# Patient Record
Sex: MALE | Race: WHITE | NOT HISPANIC OR LATINO | Employment: OTHER | URBAN - METROPOLITAN AREA
[De-identification: names, ages, dates, MRNs, and addresses within clinical notes are randomized per-mention and may not be internally consistent; named-entity substitution may affect disease eponyms.]

---

## 2018-01-19 ENCOUNTER — OFFICE VISIT CONVERTED (OUTPATIENT)
Dept: SURGERY | Facility: CLINIC | Age: 78
End: 2018-01-19
Attending: PHYSICIAN ASSISTANT

## 2018-04-05 ENCOUNTER — OFFICE VISIT CONVERTED (OUTPATIENT)
Dept: SURGERY | Facility: CLINIC | Age: 78
End: 2018-04-05
Attending: PHYSICIAN ASSISTANT

## 2018-04-13 ENCOUNTER — OFFICE VISIT CONVERTED (OUTPATIENT)
Dept: UROLOGY | Facility: CLINIC | Age: 78
End: 2018-04-13
Attending: UROLOGY

## 2018-05-02 ENCOUNTER — CONVERSION ENCOUNTER (OUTPATIENT)
Dept: SURGERY | Facility: CLINIC | Age: 78
End: 2018-05-02

## 2018-05-02 ENCOUNTER — OFFICE VISIT CONVERTED (OUTPATIENT)
Dept: UROLOGY | Facility: CLINIC | Age: 78
End: 2018-05-02
Attending: UROLOGY

## 2018-05-25 ENCOUNTER — CONVERSION ENCOUNTER (OUTPATIENT)
Dept: SURGERY | Facility: CLINIC | Age: 78
End: 2018-05-25

## 2018-05-25 ENCOUNTER — OFFICE VISIT CONVERTED (OUTPATIENT)
Dept: UROLOGY | Facility: CLINIC | Age: 78
End: 2018-05-25
Attending: UROLOGY

## 2018-07-11 ENCOUNTER — OFFICE VISIT CONVERTED (OUTPATIENT)
Dept: UROLOGY | Facility: CLINIC | Age: 78
End: 2018-07-11
Attending: UROLOGY

## 2018-10-12 ENCOUNTER — PROCEDURE VISIT CONVERTED (OUTPATIENT)
Dept: UROLOGY | Facility: CLINIC | Age: 78
End: 2018-10-12
Attending: UROLOGY

## 2019-01-16 ENCOUNTER — PROCEDURE VISIT CONVERTED (OUTPATIENT)
Dept: UROLOGY | Facility: CLINIC | Age: 79
End: 2019-01-16
Attending: UROLOGY

## 2019-01-17 ENCOUNTER — HOSPITAL ENCOUNTER (OUTPATIENT)
Dept: SURGERY | Facility: CLINIC | Age: 79
Discharge: HOME | End: 2019-01-17
Attending: UROLOGY

## 2019-04-15 ENCOUNTER — OFFICE VISIT CONVERTED (OUTPATIENT)
Dept: CARDIOLOGY | Facility: CLINIC | Age: 79
End: 2019-04-15
Attending: INTERNAL MEDICINE

## 2019-04-17 ENCOUNTER — HOSPITAL ENCOUNTER (OUTPATIENT)
Dept: SURGERY | Facility: CLINIC | Age: 79
Discharge: HOME | End: 2019-04-17
Attending: UROLOGY

## 2019-04-17 ENCOUNTER — PROCEDURE VISIT CONVERTED (OUTPATIENT)
Dept: UROLOGY | Facility: CLINIC | Age: 79
End: 2019-04-17
Attending: UROLOGY

## 2019-04-29 ENCOUNTER — CONVERSION ENCOUNTER (OUTPATIENT)
Dept: CARDIOLOGY | Facility: CLINIC | Age: 79
End: 2019-04-29
Attending: INTERNAL MEDICINE

## 2019-10-16 ENCOUNTER — PROCEDURE VISIT CONVERTED (OUTPATIENT)
Dept: UROLOGY | Facility: CLINIC | Age: 79
End: 2019-10-16
Attending: UROLOGY

## 2019-10-16 ENCOUNTER — HOSPITAL ENCOUNTER (OUTPATIENT)
Dept: SURGERY | Facility: CLINIC | Age: 79
Discharge: HOME | End: 2019-10-16
Attending: UROLOGY

## 2020-04-17 ENCOUNTER — TELEPHONE CONVERTED (OUTPATIENT)
Dept: UROLOGY | Facility: CLINIC | Age: 80
End: 2020-04-17
Attending: UROLOGY

## 2020-10-06 ENCOUNTER — PROCEDURE VISIT CONVERTED (OUTPATIENT)
Dept: UROLOGY | Facility: CLINIC | Age: 80
End: 2020-10-06
Attending: UROLOGY

## 2020-10-06 ENCOUNTER — HOSPITAL ENCOUNTER (OUTPATIENT)
Dept: OTHER | Facility: HOSPITAL | Age: 80
Discharge: HOME | End: 2020-10-06
Attending: UROLOGY

## 2020-12-28 ENCOUNTER — OFFICE VISIT CONVERTED (OUTPATIENT)
Dept: CARDIOLOGY | Facility: CLINIC | Age: 80
End: 2020-12-28
Attending: INTERNAL MEDICINE

## 2020-12-31 ENCOUNTER — HOSPITAL ENCOUNTER (OUTPATIENT)
Dept: NUCLEAR MEDICINE | Facility: HOSPITAL | Age: 80
Discharge: HOME | End: 2020-12-31
Attending: INTERNAL MEDICINE

## 2021-04-26 ENCOUNTER — CONVERSION ENCOUNTER (OUTPATIENT)
Dept: SURGERY | Facility: CLINIC | Age: 81
End: 2021-04-26

## 2021-04-26 ENCOUNTER — PROCEDURE VISIT CONVERTED (OUTPATIENT)
Dept: UROLOGY | Facility: CLINIC | Age: 81
End: 2021-04-26
Attending: UROLOGY

## 2021-04-26 ENCOUNTER — HOSPITAL ENCOUNTER (OUTPATIENT)
Dept: SURGERY | Facility: CLINIC | Age: 81
Discharge: HOME | End: 2021-04-26
Attending: UROLOGY

## 2021-05-10 NOTE — PROCEDURES
Procedure Note      Patient Name: Wood Desai   Patient ID: [id number]   Sex: Male   YOB: 1940    Primary Care Provider: Lea Fallon MD   Referring Provider: Lea Fallon MD    Visit Date: October 6, 2020    Provider: Arvind Umana MD   Location: [facility]   Location Address: [address] [place] [address]  [id number]   Location Phone: [phone]          Cystoscopy Procedure:  The patients urine was viewe d under a microscope during his clinical visit: no RBC present, no WBC present, no Bacteria present.          PROCEDURE: Flexible cystoscope was passed per urethra into the bladder without difficulty after proper consent.    TUR defect.   Still some bilateral prostatic tissue, lateral lobes    Bladder was large with some moderate trabeculations throughout.  No abnormalities seen     Of note, there was no increased vascularity as well. Both ureteral orifices were identified and were normal in appearance. The flexible cystoscope was removed. The patient tolerated the procedure well.    No changes from his last scope    Urothelial CIS      1/19 cystoscopynegative  10/18 cystoscopynegative  6/18 cystoscopy with bladder biopsyseveral erythematous areas on the posterior wall  Pathologyurothelial atypia  4/18 TURP and bladder biopsyseveral small erythematous area on the posterior bladder wall  Pathologyprostate Normal.  Bladder biopsy CIS of the bladder.           Assessment  · CIS (carcinoma in situ of bladder)     233.7/D09.0      Plan  · Orders  o Cystoscopy (61201) - 233.7/D09.0 - 10/06/2020  o Urine cytology (09259) - 233.7/D09.0 - 10/06/2020  · Medications  o Medications have been Reconciled  o Transition of Care or Provider Policy  · Instructions  o Electronically Identified Patient Education Materials Provided Electronically       per NCCN guidelines we will have him follow-up in 6 months for office cystoscopy    We will plan on getting upper tract imaging  around 10/21 once COVID pandemic is over hopefully    cytology today    Because of high risk disease will need 6-month office cystoscopy through 5-years             Electronically Signed by: Arvind Umana MD -Author on October 6, 2020 03:52:36 PM
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
## 2021-05-12 NOTE — PROGRESS NOTES
Quick Note      Patient Name: Wood Desai   Patient ID: [id number]   Sex: Male   YOB: 1940    Primary Care Provider: Lea Fallon MD   Referring Provider: Lea Fallon MD    Visit Date: April 17, 2020    Provider: Avrind Umana MD   Location: [facility]   Location Address: [address] [place] [address]  [id number]   Location Phone: [phone]          History Of Present Illness  TELEHEALTH TELEPHONE VISIT  Chief Complaint: Bladder CA   Wood Desai is a 79 year old /White male who is presenting for evaluation via telehealth telephone visit. Verbal consent obtained before beginning visit.   Provider spent 8 minutes with the patient during telehealth visit.   The following staff were present during this visit: Jerod Corona   Past Medical History/Overview of Patient Symptoms     HPI    79-year-old gentleman with a history of TURP and incidentally found CIS    Patient was scheduled for cystoscopy today, we are calling him discussed when we want to do this based on coronavirus           Assessment  · History of bladder cancer     V10.51/Z85.51      Plan  · Orders  o Physican Telephone evaluation, 5-10 min (84714) - V10.51/Z85.51 - 04/17/2020  · Medications  o Medications have been Reconciled  o Transition of Care or Provider Policy  · Instructions  o Plan Of Care:   o Electronically Identified Patient Education Materials Provided Electronically     Discussed with the patient risk and benefits of office cystoscopy with coronavirus pandemic going on versus missing a recurrence of his bladder cancer.  After discussion of risk and benefits both way we have decided to do a repeat scope in 10/20.  Patient understands the risk of missing a cancer could be detrimental to his health.  He also understands the risk of the coronavirus             Electronically Signed by: Arvind Umana MD -Author on April 17, 2020 01:15:11 PM

## 2021-05-14 VITALS
BODY MASS INDEX: 24.79 KG/M2 | HEIGHT: 72 IN | WEIGHT: 183 LBS | HEART RATE: 50 BPM | DIASTOLIC BLOOD PRESSURE: 74 MMHG | SYSTOLIC BLOOD PRESSURE: 168 MMHG

## 2021-05-14 VITALS — RESPIRATION RATE: 16 BRPM | BODY MASS INDEX: 24.38 KG/M2 | HEIGHT: 72 IN | WEIGHT: 180 LBS

## 2021-05-14 NOTE — PROGRESS NOTES
Progress Note      Patient Name: Wood Desai   Patient ID: [id number]   Sex: Male   YOB: 1940    Primary Care Provider: Lea Fallon MD   Referring Provider: Lea Fallon MD    Visit Date: December 28, 2020    Provider: Ronaldo Andrew MD   Location: [facility]   Location Address: [address] [place] [address], [address]   [place]  [id number]   Location Phone: [phone]          Chief Complaint     Coronary artery disease, reestablish cardiac care.       History Of Present Illness  REFERRING CARE PROVIDER: Lea Fallon MD   Wood Desai is a 80 year old /White male with coronary artery disease, hypertension, hyperlipidemia, paroxysmal atrial fibrillation, and previous coronary artery perforation. The patient is here for a followup visit. He was previously seen once in April 2019 for a second opinion. Since then, he went back to his primary cardiologist, who recently moved out of state. Today, he reports feeling fine. He occasionally gets chest pain. This is described as a heaviness in the central part of the chest, mostly while lying down. No major palpitations, but occasionally feels skipped beats. No shortness of breath, dizziness, or syncopal episodes. He is going to have a neck surgery done and is requesting a preoperative cardiac evaluation, as well.   PAST MEDICAL HISTORY: 1) Coronary artery disease with index presentation being non-STEMI on 01/07/2018 involving the right coronary artery with unsuccessful angioplasty and coronary perforation status post covered stent placement; 2) One episode of atrial fibrillation; 3) Recurrent tremor on primidone; 4) Hyperlipidemia.   PSYCHOSOCIAL HISTORY: Denies tobacco use. Denies alcohol use.   CURRENT MEDICATIONS: Metoprolol tartrate 25 mg t.i.d.; amlodipine 10 mg daily; primidone 250 mg daily; rosuvastatin 10 mg daily; levothyroxine 50 mcg daily; aspirin 81 mg daily; AREDS vitamin b.i.d.      ALLERGIES:   No known drug allergies.        Review of Systems  · Cardiovascular  o Admits  o : palpitations (fast, fluttering, or skipping beats), chest pain or angina pectoris   o Denies  o : swelling (feet, ankles, hands), shortness of breath while walking or lying flat  · Respiratory  o Denies  o : chronic or frequent cough      Vitals  Date Time BP Position Site L\R Cuff Size HR RR TEMP (F) WT  HT  BMI kg/m2 BSA m2 O2 Sat FR L/min FiO2 HC       12/28/2020 09:24 /74 Sitting    50 - R   183lbs 0oz 6'   24.82 2.05       12/28/2020 09:24 /76 Sitting    50 - R                   Physical Examination  · Respiratory  o Auscultation of Lungs  o : Clear to auscultation bilaterally. No crackles or rhonchi.  · Cardiovascular  o Heart  o : S1, S2 is normally heard. No S3. No murmur, rubs, or gallops.  · Gastrointestinal  o Abdominal Examination  o : Soft, nontender, nondistended. No free fluid. Bowel sounds heard in all four quadrants.  · Extremities  o Extremities  o : Warm and well perfused. No pitting pedal edema. Distal pulses present.  · EKG  o EKG  o : EKG done at PCP's office on 08/25/2020 showed normal sinus rhythm, normal axis, poor R-wave progression, anterior leads. Abnormal EKG.   · Labs  o Labs  o : On 12/03/2020, sodium 140, potassium 5.4, chloride 101, bicarb 27, BUN 17, creatinine 0.8, AST 12, ALT 12, bilirubin 0.3, hemoglobin 16.0, hematocrit 46.4, total cholesterol 102, triglycerides 58, HDL 56, LDL 44, TSH 1.91.          Assessment     ASSESSMENT & PLAN:     1.  Coronary artery disease.  Patient had an unsuccessful angioplasty followed by coronary artery stent        placement to the right coronary artery in 2018 and currently has chest pain, but atypical for angina.         Echocardiogram in 2019 showed preserved LV function and no pericardial effusion.  EKG has some ischemic        changes.  Because of ongoing, recurrent changes, recommend to have a SPECT stress test to rule out        ischemia.  Continue aspirin, statin, and  beta-blocker.    2.  Hyperlipidemia.  LDL at goal.  Continue Crestor.  3.  History of ventricular tachycardia.  In the setting of acute coronary syndrome.  He was previously on        amiodarone, which was discontinued because of thyroid abnormalities.    4.  Preoperative cardiac risk stratification.  The patient is going to have a moderate-risk surgery.  Further        recommendations after SPECT stress test.    5.  Follow up with SPECT report.        Ronaldo Andrew MD  JV:vm             Electronically Signed by: Merna Chicas-, Other -Author on January 5, 2021 06:57:27 AM  Electronically Co-signed by: Ronaldo Andrew MD -Reviewer on January 8, 2021 01:35:00 PM

## 2021-05-15 VITALS
WEIGHT: 188 LBS | DIASTOLIC BLOOD PRESSURE: 82 MMHG | BODY MASS INDEX: 25.47 KG/M2 | HEART RATE: 52 BPM | HEIGHT: 72 IN | SYSTOLIC BLOOD PRESSURE: 138 MMHG

## 2021-05-15 VITALS — WEIGHT: 188 LBS | HEIGHT: 72 IN | BODY MASS INDEX: 25.47 KG/M2 | RESPIRATION RATE: 16 BRPM

## 2021-05-16 VITALS — BODY MASS INDEX: 24.04 KG/M2 | WEIGHT: 177.5 LBS | HEIGHT: 72 IN | RESPIRATION RATE: 12 BRPM

## 2021-05-16 VITALS — HEIGHT: 72 IN | BODY MASS INDEX: 23.03 KG/M2 | WEIGHT: 170 LBS | RESPIRATION RATE: 16 BRPM

## 2021-05-16 VITALS — BODY MASS INDEX: 22.75 KG/M2 | RESPIRATION RATE: 18 BRPM | HEIGHT: 72 IN | WEIGHT: 168 LBS

## 2021-05-16 VITALS — HEIGHT: 72 IN | BODY MASS INDEX: 22.82 KG/M2 | WEIGHT: 168.5 LBS | RESPIRATION RATE: 12 BRPM

## 2021-05-16 VITALS — HEIGHT: 72 IN | WEIGHT: 170 LBS | RESPIRATION RATE: 12 BRPM | BODY MASS INDEX: 23.03 KG/M2

## 2021-05-16 VITALS — RESPIRATION RATE: 14 BRPM | HEIGHT: 72 IN | BODY MASS INDEX: 22.75 KG/M2 | WEIGHT: 168 LBS

## 2021-05-16 VITALS — BODY MASS INDEX: 23.03 KG/M2 | RESPIRATION RATE: 14 BRPM | WEIGHT: 170 LBS | HEIGHT: 72 IN

## 2021-07-27 PROBLEM — I10 HIGH BLOOD PRESSURE: Status: ACTIVE | Noted: 2021-07-27

## 2021-07-27 PROBLEM — M10.9 GOUT: Status: ACTIVE | Noted: 2021-07-27

## 2021-07-27 PROBLEM — N40.0 BPH (BENIGN PROSTATIC HYPERPLASIA): Status: ACTIVE | Noted: 2021-07-27

## 2021-07-27 PROBLEM — N30.00 ACUTE CYSTITIS: Status: ACTIVE | Noted: 2018-04-17

## 2022-01-03 PROBLEM — I25.10 CORONARY ARTERY DISEASE INVOLVING NATIVE HEART WITHOUT ANGINA PECTORIS: Chronic | Status: ACTIVE | Noted: 2022-01-03

## 2022-01-03 PROBLEM — I25.10 CORONARY ARTERY DISEASE INVOLVING NATIVE HEART WITHOUT ANGINA PECTORIS: Chronic | Status: ACTIVE | Noted: 2018-01-07

## 2022-01-03 PROBLEM — I25.10 CORONARY ARTERY DISEASE INVOLVING NATIVE HEART WITHOUT ANGINA PECTORIS: Status: ACTIVE | Noted: 2022-01-03

## 2022-01-17 ENCOUNTER — LAB REQUISITION (OUTPATIENT)
Dept: LAB | Facility: HOSPITAL | Age: 82
End: 2022-01-17

## 2022-01-17 DIAGNOSIS — Z00.00 ENCOUNTER FOR GENERAL ADULT MEDICAL EXAMINATION WITHOUT ABNORMAL FINDINGS: ICD-10-CM

## 2022-01-17 LAB
BASOPHILS # BLD AUTO: 0.05 10*3/MM3 (ref 0–0.2)
BASOPHILS NFR BLD AUTO: 0.3 % (ref 0–1.5)
DEPRECATED RDW RBC AUTO: 59.7 FL (ref 37–54)
EOSINOPHIL # BLD AUTO: 0.57 10*3/MM3 (ref 0–0.4)
EOSINOPHIL NFR BLD AUTO: 3.5 % (ref 0.3–6.2)
ERYTHROCYTE [DISTWIDTH] IN BLOOD BY AUTOMATED COUNT: 17.1 % (ref 12.3–15.4)
HCT VFR BLD AUTO: 31.6 % (ref 37.5–51)
HGB BLD-MCNC: 10.6 G/DL (ref 13–17.7)
IMM GRANULOCYTES # BLD AUTO: 0.08 10*3/MM3 (ref 0–0.05)
IMM GRANULOCYTES NFR BLD AUTO: 0.5 % (ref 0–0.5)
LYMPHOCYTES # BLD AUTO: 3.49 10*3/MM3 (ref 0.7–3.1)
LYMPHOCYTES NFR BLD AUTO: 21.1 % (ref 19.6–45.3)
MCH RBC QN AUTO: 32.5 PG (ref 26.6–33)
MCHC RBC AUTO-ENTMCNC: 33.5 G/DL (ref 31.5–35.7)
MCV RBC AUTO: 96.9 FL (ref 79–97)
MONOCYTES # BLD AUTO: 1.59 10*3/MM3 (ref 0.1–0.9)
MONOCYTES NFR BLD AUTO: 9.6 % (ref 5–12)
NEUTROPHILS NFR BLD AUTO: 10.74 10*3/MM3 (ref 1.7–7)
NEUTROPHILS NFR BLD AUTO: 65 % (ref 42.7–76)
NRBC BLD AUTO-RTO: 0 /100 WBC (ref 0–0.2)
PLATELET # BLD AUTO: 233 10*3/MM3 (ref 140–450)
PMV BLD AUTO: 11.4 FL (ref 6–12)
RBC # BLD AUTO: 3.26 10*6/MM3 (ref 4.14–5.8)
WBC NRBC COR # BLD: 16.52 10*3/MM3 (ref 3.4–10.8)

## 2022-01-17 PROCEDURE — 85025 COMPLETE CBC W/AUTO DIFF WBC: CPT | Performed by: PHYSICAL MEDICINE & REHABILITATION

## 2022-01-26 ENCOUNTER — TELEPHONE (OUTPATIENT)
Dept: UROLOGY | Facility: CLINIC | Age: 82
End: 2022-01-26
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
## 2022-02-15 ENCOUNTER — HOSPITAL ENCOUNTER (INPATIENT)
Facility: HOSPITAL | Age: 82
LOS: 8 days | Discharge: NURSING, REHAB OR LONG-TERM CARE | End: 2022-02-24
Attending: EMERGENCY MEDICINE | Admitting: INTERNAL MEDICINE

## 2022-02-15 ENCOUNTER — APPOINTMENT (OUTPATIENT)
Dept: GENERAL RADIOLOGY | Facility: HOSPITAL | Age: 82
End: 2022-02-15

## 2022-02-15 ENCOUNTER — APPOINTMENT (OUTPATIENT)
Dept: CT IMAGING | Facility: HOSPITAL | Age: 82
End: 2022-02-15

## 2022-02-15 DIAGNOSIS — R53.1 WEAKNESS: ICD-10-CM

## 2022-02-15 DIAGNOSIS — R26.2 DIFFICULTY IN WALKING: ICD-10-CM

## 2022-02-15 DIAGNOSIS — J18.9 PNEUMONIA OF LEFT LUNG DUE TO INFECTIOUS ORGANISM, UNSPECIFIED PART OF LUNG: Primary | ICD-10-CM

## 2022-02-15 DIAGNOSIS — R13.12 DYSPHAGIA, OROPHARYNGEAL: ICD-10-CM

## 2022-02-15 DIAGNOSIS — Z78.9 DECREASED ACTIVITIES OF DAILY LIVING (ADL): ICD-10-CM

## 2022-02-15 LAB
ALBUMIN SERPL-MCNC: 3.9 G/DL (ref 3.5–5.2)
ALBUMIN/GLOB SERPL: 1.3 G/DL
ALP SERPL-CCNC: 167 U/L (ref 39–117)
ALT SERPL W P-5'-P-CCNC: 29 U/L (ref 1–41)
ANION GAP SERPL CALCULATED.3IONS-SCNC: 12.9 MMOL/L (ref 5–15)
AST SERPL-CCNC: 26 U/L (ref 1–40)
BASOPHILS # BLD AUTO: 0.07 10*3/MM3 (ref 0–0.2)
BASOPHILS NFR BLD AUTO: 0.3 % (ref 0–1.5)
BILIRUB SERPL-MCNC: 0.2 MG/DL (ref 0–1.2)
BUN SERPL-MCNC: 28 MG/DL (ref 8–23)
BUN/CREAT SERPL: 35.4 (ref 7–25)
CALCIUM SPEC-SCNC: 9.8 MG/DL (ref 8.6–10.5)
CHLORIDE SERPL-SCNC: 98 MMOL/L (ref 98–107)
CO2 SERPL-SCNC: 27.1 MMOL/L (ref 22–29)
CREAT SERPL-MCNC: 0.79 MG/DL (ref 0.76–1.27)
D-LACTATE SERPL-SCNC: 2.7 MMOL/L (ref 0.5–2)
DEPRECATED RDW RBC AUTO: 54.5 FL (ref 37–54)
EOSINOPHIL # BLD AUTO: 0.67 10*3/MM3 (ref 0–0.4)
EOSINOPHIL NFR BLD AUTO: 3.3 % (ref 0.3–6.2)
ERYTHROCYTE [DISTWIDTH] IN BLOOD BY AUTOMATED COUNT: 15.1 % (ref 12.3–15.4)
GFR SERPL CREATININE-BSD FRML MDRD: 95 ML/MIN/1.73
GLOBULIN UR ELPH-MCNC: 2.9 GM/DL
GLUCOSE BLDC GLUCOMTR-MCNC: 109 MG/DL (ref 70–99)
GLUCOSE SERPL-MCNC: 110 MG/DL (ref 65–99)
HCT VFR BLD AUTO: 40.2 % (ref 37.5–51)
HGB BLD-MCNC: 13.2 G/DL (ref 13–17.7)
HOLD SPECIMEN: NORMAL
HOLD SPECIMEN: NORMAL
IMM GRANULOCYTES # BLD AUTO: 0.08 10*3/MM3 (ref 0–0.05)
IMM GRANULOCYTES NFR BLD AUTO: 0.4 % (ref 0–0.5)
LARGE PLATELETS: NORMAL
LYMPHOCYTES # BLD AUTO: 3.26 10*3/MM3 (ref 0.7–3.1)
LYMPHOCYTES NFR BLD AUTO: 15.8 % (ref 19.6–45.3)
MACROCYTES BLD QL SMEAR: NORMAL
MAGNESIUM SERPL-MCNC: 2.2 MG/DL (ref 1.6–2.4)
MCH RBC QN AUTO: 32.4 PG (ref 26.6–33)
MCHC RBC AUTO-ENTMCNC: 32.8 G/DL (ref 31.5–35.7)
MCV RBC AUTO: 98.8 FL (ref 79–97)
MONOCYTES # BLD AUTO: 2.11 10*3/MM3 (ref 0.1–0.9)
MONOCYTES NFR BLD AUTO: 10.3 % (ref 5–12)
NEUTROPHILS NFR BLD AUTO: 14.38 10*3/MM3 (ref 1.7–7)
NEUTROPHILS NFR BLD AUTO: 69.9 % (ref 42.7–76)
NRBC BLD AUTO-RTO: 0.1 /100 WBC (ref 0–0.2)
PLATELET # BLD AUTO: 338 10*3/MM3 (ref 140–450)
PMV BLD AUTO: 12.4 FL (ref 6–12)
POLYCHROMASIA BLD QL SMEAR: NORMAL
POTASSIUM SERPL-SCNC: 5.1 MMOL/L (ref 3.5–5.2)
PROT SERPL-MCNC: 6.8 G/DL (ref 6–8.5)
RBC # BLD AUTO: 4.07 10*6/MM3 (ref 4.14–5.8)
SMALL PLATELETS BLD QL SMEAR: ADEQUATE
SODIUM SERPL-SCNC: 138 MMOL/L (ref 136–145)
TARGETS BLD QL SMEAR: NORMAL
TROPONIN T SERPL-MCNC: 0.05 NG/ML (ref 0–0.03)
WBC MORPH BLD: NORMAL
WBC NRBC COR # BLD: 20.57 10*3/MM3 (ref 3.4–10.8)
WHOLE BLOOD HOLD SPECIMEN: NORMAL
WHOLE BLOOD HOLD SPECIMEN: NORMAL

## 2022-02-15 PROCEDURE — 84484 ASSAY OF TROPONIN QUANT: CPT

## 2022-02-15 PROCEDURE — 85007 BL SMEAR W/DIFF WBC COUNT: CPT

## 2022-02-15 PROCEDURE — 82962 GLUCOSE BLOOD TEST: CPT

## 2022-02-15 PROCEDURE — 93005 ELECTROCARDIOGRAM TRACING: CPT | Performed by: EMERGENCY MEDICINE

## 2022-02-15 PROCEDURE — 80053 COMPREHEN METABOLIC PANEL: CPT

## 2022-02-15 PROCEDURE — 71045 X-RAY EXAM CHEST 1 VIEW: CPT

## 2022-02-15 PROCEDURE — 83605 ASSAY OF LACTIC ACID: CPT

## 2022-02-15 PROCEDURE — 93005 ELECTROCARDIOGRAM TRACING: CPT

## 2022-02-15 PROCEDURE — 85025 COMPLETE CBC W/AUTO DIFF WBC: CPT

## 2022-02-15 PROCEDURE — 83735 ASSAY OF MAGNESIUM: CPT

## 2022-02-15 PROCEDURE — 99285 EMERGENCY DEPT VISIT HI MDM: CPT

## 2022-02-15 PROCEDURE — 93010 ELECTROCARDIOGRAM REPORT: CPT | Performed by: INTERNAL MEDICINE

## 2022-02-15 PROCEDURE — 70450 CT HEAD/BRAIN W/O DYE: CPT

## 2022-02-15 RX ORDER — GUAIFENESIN 200 MG/10ML
200 LIQUID ORAL
Status: ON HOLD | COMMUNITY
Start: 2022-01-10 | End: 2022-02-16

## 2022-02-15 RX ORDER — CHOLECALCIFEROL (VITAMIN D3) 125 MCG
5 CAPSULE ORAL NIGHTLY
Status: ON HOLD | COMMUNITY
Start: 2022-01-10 | End: 2022-02-24 | Stop reason: SDUPTHER

## 2022-02-15 RX ORDER — CEFEPIME 1 G/50ML
2 INJECTION, SOLUTION INTRAVENOUS ONCE
Status: COMPLETED | OUTPATIENT
Start: 2022-02-15 | End: 2022-02-16

## 2022-02-15 RX ORDER — SODIUM CHLORIDE 0.9 % (FLUSH) 0.9 %
10 SYRINGE (ML) INJECTION AS NEEDED
Status: DISCONTINUED | OUTPATIENT
Start: 2022-02-15 | End: 2022-02-24 | Stop reason: HOSPADM

## 2022-02-15 RX ADMIN — SODIUM CHLORIDE, POTASSIUM CHLORIDE, SODIUM LACTATE AND CALCIUM CHLORIDE 2199 ML: 600; 310; 30; 20 INJECTION, SOLUTION INTRAVENOUS at 23:51

## 2022-02-16 PROBLEM — J18.9 PNEUMONIA OF LEFT LUNG DUE TO INFECTIOUS ORGANISM: Status: ACTIVE | Noted: 2022-02-16

## 2022-02-16 PROBLEM — R77.8 ELEVATED TROPONIN: Status: ACTIVE | Noted: 2022-02-16

## 2022-02-16 PROBLEM — R79.89 ELEVATED TROPONIN: Status: ACTIVE | Noted: 2022-02-16

## 2022-02-16 LAB
CK MB SERPL-CCNC: 2.33 NG/ML
CK SERPL-CCNC: 21 U/L (ref 20–200)
D-LACTATE SERPL-SCNC: 1.6 MMOL/L (ref 0.5–2)
L PNEUMO1 AG UR QL IA: NEGATIVE
MRSA DNA SPEC QL NAA+PROBE: ABNORMAL
QT INTERVAL: 419 MS
S PNEUM AG SPEC QL LA: NEGATIVE
TROPONIN T SERPL-MCNC: 0.06 NG/ML (ref 0–0.03)

## 2022-02-16 PROCEDURE — 25010000002 VANCOMYCIN 5 G RECONSTITUTED SOLUTION: Performed by: EMERGENCY MEDICINE

## 2022-02-16 PROCEDURE — 87186 SC STD MICRODIL/AGAR DIL: CPT | Performed by: INTERNAL MEDICINE

## 2022-02-16 PROCEDURE — 87040 BLOOD CULTURE FOR BACTERIA: CPT | Performed by: EMERGENCY MEDICINE

## 2022-02-16 PROCEDURE — 99221 1ST HOSP IP/OBS SF/LOW 40: CPT | Performed by: INTERNAL MEDICINE

## 2022-02-16 PROCEDURE — 87070 CULTURE OTHR SPECIMN AEROBIC: CPT | Performed by: INTERNAL MEDICINE

## 2022-02-16 PROCEDURE — 87899 AGENT NOS ASSAY W/OPTIC: CPT | Performed by: INTERNAL MEDICINE

## 2022-02-16 PROCEDURE — 87205 SMEAR GRAM STAIN: CPT | Performed by: INTERNAL MEDICINE

## 2022-02-16 PROCEDURE — 83605 ASSAY OF LACTIC ACID: CPT | Performed by: EMERGENCY MEDICINE

## 2022-02-16 PROCEDURE — 87641 MR-STAPH DNA AMP PROBE: CPT | Performed by: FAMILY MEDICINE

## 2022-02-16 PROCEDURE — 82553 CREATINE MB FRACTION: CPT | Performed by: INTERNAL MEDICINE

## 2022-02-16 PROCEDURE — 82550 ASSAY OF CK (CPK): CPT | Performed by: INTERNAL MEDICINE

## 2022-02-16 PROCEDURE — 25010000002 CEFEPIME PER 500 MG: Performed by: EMERGENCY MEDICINE

## 2022-02-16 PROCEDURE — 99223 1ST HOSP IP/OBS HIGH 75: CPT | Performed by: FAMILY MEDICINE

## 2022-02-16 PROCEDURE — 87147 CULTURE TYPE IMMUNOLOGIC: CPT | Performed by: INTERNAL MEDICINE

## 2022-02-16 PROCEDURE — 25010000002 CEFEPIME PER 500 MG: Performed by: FAMILY MEDICINE

## 2022-02-16 PROCEDURE — 84484 ASSAY OF TROPONIN QUANT: CPT | Performed by: FAMILY MEDICINE

## 2022-02-16 PROCEDURE — 25010000002 VANCOMYCIN 5 G RECONSTITUTED SOLUTION: Performed by: FAMILY MEDICINE

## 2022-02-16 PROCEDURE — 97161 PT EVAL LOW COMPLEX 20 MIN: CPT

## 2022-02-16 RX ORDER — IPRATROPIUM BROMIDE AND ALBUTEROL SULFATE 2.5; .5 MG/3ML; MG/3ML
3 SOLUTION RESPIRATORY (INHALATION) EVERY 4 HOURS PRN
Status: DISCONTINUED | OUTPATIENT
Start: 2022-02-16 | End: 2022-02-24 | Stop reason: HOSPADM

## 2022-02-16 RX ORDER — PRIMIDONE 250 MG/1
750 TABLET ORAL NIGHTLY
Status: DISCONTINUED | OUTPATIENT
Start: 2022-02-16 | End: 2022-02-16

## 2022-02-16 RX ORDER — CHOLECALCIFEROL (VITAMIN D3) 125 MCG
5 CAPSULE ORAL NIGHTLY
Status: DISCONTINUED | OUTPATIENT
Start: 2022-02-16 | End: 2022-02-24 | Stop reason: HOSPADM

## 2022-02-16 RX ORDER — PANTOPRAZOLE SODIUM 40 MG/10ML
40 INJECTION, POWDER, LYOPHILIZED, FOR SOLUTION INTRAVENOUS
Status: DISCONTINUED | OUTPATIENT
Start: 2022-02-17 | End: 2022-02-24 | Stop reason: HOSPADM

## 2022-02-16 RX ORDER — CEFEPIME 1 G/50ML
2 INJECTION, SOLUTION INTRAVENOUS EVERY 8 HOURS
Status: DISCONTINUED | OUTPATIENT
Start: 2022-02-16 | End: 2022-02-17

## 2022-02-16 RX ORDER — ASPIRIN 81 MG/1
81 TABLET ORAL DAILY
COMMUNITY
End: 2022-02-24 | Stop reason: HOSPADM

## 2022-02-16 RX ORDER — PANTOPRAZOLE SODIUM 40 MG/1
40 TABLET, DELAYED RELEASE ORAL EVERY MORNING
Status: DISCONTINUED | OUTPATIENT
Start: 2022-02-16 | End: 2022-02-16

## 2022-02-16 RX ORDER — LEVETIRACETAM 1000 MG/1
1000 TABLET ORAL 2 TIMES DAILY
Status: ON HOLD | COMMUNITY
End: 2022-02-24 | Stop reason: SDUPTHER

## 2022-02-16 RX ORDER — ASPIRIN 81 MG/1
81 TABLET ORAL DAILY
Status: DISCONTINUED | OUTPATIENT
Start: 2022-02-16 | End: 2022-02-16

## 2022-02-16 RX ORDER — ASPIRIN 81 MG/1
81 TABLET, CHEWABLE ORAL DAILY
Status: DISCONTINUED | OUTPATIENT
Start: 2022-02-16 | End: 2022-02-24 | Stop reason: HOSPADM

## 2022-02-16 RX ORDER — QUETIAPINE FUMARATE 25 MG/1
50 TABLET, FILM COATED ORAL NIGHTLY
Status: DISCONTINUED | OUTPATIENT
Start: 2022-02-16 | End: 2022-02-24 | Stop reason: HOSPADM

## 2022-02-16 RX ORDER — QUETIAPINE FUMARATE 25 MG/1
50 TABLET, FILM COATED ORAL NIGHTLY
Status: DISCONTINUED | OUTPATIENT
Start: 2022-02-16 | End: 2022-02-16

## 2022-02-16 RX ORDER — PRIMIDONE 250 MG/1
750 TABLET ORAL NIGHTLY
Status: DISCONTINUED | OUTPATIENT
Start: 2022-02-16 | End: 2022-02-24 | Stop reason: HOSPADM

## 2022-02-16 RX ORDER — LEVOTHYROXINE SODIUM 0.05 MG/1
50 TABLET ORAL DAILY
Status: DISCONTINUED | OUTPATIENT
Start: 2022-02-16 | End: 2022-02-16

## 2022-02-16 RX ORDER — PRIMIDONE 250 MG/1
750 TABLET ORAL NIGHTLY
Status: ON HOLD | COMMUNITY
End: 2022-02-24 | Stop reason: SDUPTHER

## 2022-02-16 RX ORDER — METOPROLOL TARTRATE 75 MG/1
150 TABLET, FILM COATED ORAL 2 TIMES DAILY
Status: ON HOLD | COMMUNITY
End: 2022-02-16

## 2022-02-16 RX ORDER — QUETIAPINE FUMARATE 50 MG/1
50 TABLET, FILM COATED ORAL NIGHTLY
Status: ON HOLD | COMMUNITY
End: 2022-02-24 | Stop reason: SDUPTHER

## 2022-02-16 RX ORDER — CHOLECALCIFEROL (VITAMIN D3) 125 MCG
5 CAPSULE ORAL NIGHTLY
Status: DISCONTINUED | OUTPATIENT
Start: 2022-02-16 | End: 2022-02-16

## 2022-02-16 RX ORDER — GABAPENTIN 300 MG/1
300 CAPSULE ORAL EVERY 8 HOURS SCHEDULED
Status: DISCONTINUED | OUTPATIENT
Start: 2022-02-16 | End: 2022-02-16

## 2022-02-16 RX ORDER — SODIUM CHLORIDE 9 MG/ML
50 INJECTION, SOLUTION INTRAVENOUS CONTINUOUS
Status: DISCONTINUED | OUTPATIENT
Start: 2022-02-16 | End: 2022-02-16

## 2022-02-16 RX ORDER — FINASTERIDE 5 MG/1
5 TABLET, FILM COATED ORAL DAILY
Status: DISCONTINUED | OUTPATIENT
Start: 2022-02-16 | End: 2022-02-24 | Stop reason: HOSPADM

## 2022-02-16 RX ORDER — LEVETIRACETAM 100 MG/ML
1000 SOLUTION ORAL EVERY 12 HOURS SCHEDULED
Status: DISCONTINUED | OUTPATIENT
Start: 2022-02-16 | End: 2022-02-24 | Stop reason: HOSPADM

## 2022-02-16 RX ORDER — GABAPENTIN 600 MG/1
600 TABLET ORAL 3 TIMES DAILY
Status: ON HOLD | COMMUNITY
End: 2022-02-24 | Stop reason: SDUPTHER

## 2022-02-16 RX ORDER — LEVOTHYROXINE SODIUM 0.05 MG/1
50 TABLET ORAL DAILY
Status: DISCONTINUED | OUTPATIENT
Start: 2022-02-17 | End: 2022-02-24 | Stop reason: HOSPADM

## 2022-02-16 RX ORDER — LANSOPRAZOLE 30 MG/1
30 CAPSULE, DELAYED RELEASE ORAL DAILY
COMMUNITY
End: 2022-02-24 | Stop reason: HOSPADM

## 2022-02-16 RX ORDER — LEVETIRACETAM 500 MG/1
1000 TABLET ORAL 2 TIMES DAILY
Status: DISCONTINUED | OUTPATIENT
Start: 2022-02-16 | End: 2022-02-16

## 2022-02-16 RX ORDER — LACTULOSE 10 G/15ML
20 SOLUTION ORAL AS NEEDED
Status: ON HOLD | COMMUNITY
End: 2022-02-24 | Stop reason: SDUPTHER

## 2022-02-16 RX ORDER — GABAPENTIN 300 MG/1
300 CAPSULE ORAL EVERY 8 HOURS SCHEDULED
Status: DISCONTINUED | OUTPATIENT
Start: 2022-02-16 | End: 2022-02-24 | Stop reason: HOSPADM

## 2022-02-16 RX ORDER — FINASTERIDE 5 MG/1
5 TABLET, FILM COATED ORAL DAILY
Status: ON HOLD | COMMUNITY
End: 2022-02-24 | Stop reason: SDUPTHER

## 2022-02-16 RX ORDER — LEVOTHYROXINE SODIUM 0.05 MG/1
50 TABLET ORAL DAILY
Status: ON HOLD | COMMUNITY
End: 2022-02-24 | Stop reason: SDUPTHER

## 2022-02-16 RX ADMIN — GABAPENTIN 300 MG: 300 CAPSULE ORAL at 21:34

## 2022-02-16 RX ADMIN — VANCOMYCIN HYDROCHLORIDE 1500 MG: 5 INJECTION, POWDER, LYOPHILIZED, FOR SOLUTION INTRAVENOUS at 01:26

## 2022-02-16 RX ADMIN — LEVETIRACETAM 1000 MG: 100 SOLUTION ORAL at 22:28

## 2022-02-16 RX ADMIN — CEFEPIME 2 G: 1 INJECTION, SOLUTION INTRAVENOUS at 08:19

## 2022-02-16 RX ADMIN — PRIMIDONE 750 MG: 250 TABLET ORAL at 21:34

## 2022-02-16 RX ADMIN — ASPIRIN 81 MG CHEWABLE TABLET 81 MG: 81 TABLET CHEWABLE at 13:16

## 2022-02-16 RX ADMIN — METOPROLOL TARTRATE 75 MG: 50 TABLET, FILM COATED ORAL at 21:34

## 2022-02-16 RX ADMIN — SODIUM CHLORIDE 50 ML/HR: 9 INJECTION, SOLUTION INTRAVENOUS at 06:22

## 2022-02-16 RX ADMIN — QUETIAPINE FUMARATE 50 MG: 25 TABLET ORAL at 21:34

## 2022-02-16 RX ADMIN — CEFEPIME 2 G: 1 INJECTION, SOLUTION INTRAVENOUS at 00:05

## 2022-02-16 RX ADMIN — CEFEPIME 2 G: 1 INJECTION, SOLUTION INTRAVENOUS at 15:46

## 2022-02-16 RX ADMIN — GABAPENTIN 300 MG: 300 CAPSULE ORAL at 13:15

## 2022-02-16 RX ADMIN — VANCOMYCIN HYDROCHLORIDE 750 MG: 5 INJECTION, POWDER, LYOPHILIZED, FOR SOLUTION INTRAVENOUS at 13:16

## 2022-02-16 RX ADMIN — Medication 5 MG: at 21:34

## 2022-02-16 RX ADMIN — LEVETIRACETAM 1000 MG: 100 SOLUTION ORAL at 14:03

## 2022-02-16 RX ADMIN — FINASTERIDE 5 MG: 5 TABLET, FILM COATED ORAL at 13:16

## 2022-02-17 LAB
ANION GAP SERPL CALCULATED.3IONS-SCNC: 7.7 MMOL/L (ref 5–15)
BUN SERPL-MCNC: 20 MG/DL (ref 8–23)
BUN/CREAT SERPL: 33.9 (ref 7–25)
CALCIUM SPEC-SCNC: 9.7 MG/DL (ref 8.6–10.5)
CHLORIDE SERPL-SCNC: 105 MMOL/L (ref 98–107)
CO2 SERPL-SCNC: 25.3 MMOL/L (ref 22–29)
CREAT SERPL-MCNC: 0.59 MG/DL (ref 0.76–1.27)
DEPRECATED RDW RBC AUTO: 52.9 FL (ref 37–54)
ERYTHROCYTE [DISTWIDTH] IN BLOOD BY AUTOMATED COUNT: 14.6 % (ref 12.3–15.4)
GFR SERPL CREATININE-BSD FRML MDRD: 133 ML/MIN/1.73
GLUCOSE SERPL-MCNC: 136 MG/DL (ref 65–99)
HCT VFR BLD AUTO: 35.5 % (ref 37.5–51)
HGB BLD-MCNC: 11.6 G/DL (ref 13–17.7)
MCH RBC QN AUTO: 31.9 PG (ref 26.6–33)
MCHC RBC AUTO-ENTMCNC: 32.7 G/DL (ref 31.5–35.7)
MCV RBC AUTO: 97.5 FL (ref 79–97)
PLATELET # BLD AUTO: 312 10*3/MM3 (ref 140–450)
PMV BLD AUTO: 12.4 FL (ref 6–12)
POTASSIUM SERPL-SCNC: 3.9 MMOL/L (ref 3.5–5.2)
RBC # BLD AUTO: 3.64 10*6/MM3 (ref 4.14–5.8)
SODIUM SERPL-SCNC: 138 MMOL/L (ref 136–145)
VANCOMYCIN SERPL-MCNC: 14.24 MCG/ML (ref 5–40)
WBC NRBC COR # BLD: 13.18 10*3/MM3 (ref 3.4–10.8)

## 2022-02-17 PROCEDURE — 25010000002 VANCOMYCIN 5 G RECONSTITUTED SOLUTION: Performed by: FAMILY MEDICINE

## 2022-02-17 PROCEDURE — 25010000002 CEFEPIME PER 500 MG: Performed by: FAMILY MEDICINE

## 2022-02-17 PROCEDURE — 80202 ASSAY OF VANCOMYCIN: CPT | Performed by: FAMILY MEDICINE

## 2022-02-17 PROCEDURE — 80048 BASIC METABOLIC PNL TOTAL CA: CPT | Performed by: FAMILY MEDICINE

## 2022-02-17 PROCEDURE — 85027 COMPLETE CBC AUTOMATED: CPT | Performed by: INTERNAL MEDICINE

## 2022-02-17 PROCEDURE — 97165 OT EVAL LOW COMPLEX 30 MIN: CPT

## 2022-02-17 PROCEDURE — 99233 SBSQ HOSP IP/OBS HIGH 50: CPT | Performed by: INTERNAL MEDICINE

## 2022-02-17 PROCEDURE — 94799 UNLISTED PULMONARY SVC/PX: CPT

## 2022-02-17 RX ADMIN — GABAPENTIN 300 MG: 300 CAPSULE ORAL at 14:21

## 2022-02-17 RX ADMIN — FINASTERIDE 5 MG: 5 TABLET, FILM COATED ORAL at 08:45

## 2022-02-17 RX ADMIN — GABAPENTIN 300 MG: 300 CAPSULE ORAL at 20:32

## 2022-02-17 RX ADMIN — PANTOPRAZOLE SODIUM 40 MG: 40 INJECTION, POWDER, FOR SOLUTION INTRAVENOUS at 05:56

## 2022-02-17 RX ADMIN — LEVOTHYROXINE SODIUM 50 MCG: 50 TABLET ORAL at 06:00

## 2022-02-17 RX ADMIN — METOPROLOL TARTRATE 75 MG: 50 TABLET, FILM COATED ORAL at 20:31

## 2022-02-17 RX ADMIN — LEVETIRACETAM 1000 MG: 100 SOLUTION ORAL at 21:29

## 2022-02-17 RX ADMIN — Medication 5 MG: at 20:31

## 2022-02-17 RX ADMIN — VANCOMYCIN HYDROCHLORIDE 1000 MG: 5 INJECTION, POWDER, LYOPHILIZED, FOR SOLUTION INTRAVENOUS at 14:21

## 2022-02-17 RX ADMIN — VANCOMYCIN HYDROCHLORIDE 750 MG: 5 INJECTION, POWDER, LYOPHILIZED, FOR SOLUTION INTRAVENOUS at 02:34

## 2022-02-17 RX ADMIN — CEFEPIME 2 G: 1 INJECTION, SOLUTION INTRAVENOUS at 08:43

## 2022-02-17 RX ADMIN — PRIMIDONE 750 MG: 250 TABLET ORAL at 20:31

## 2022-02-17 RX ADMIN — GABAPENTIN 300 MG: 300 CAPSULE ORAL at 05:57

## 2022-02-17 RX ADMIN — METOPROLOL TARTRATE 75 MG: 50 TABLET, FILM COATED ORAL at 08:44

## 2022-02-17 RX ADMIN — QUETIAPINE FUMARATE 50 MG: 25 TABLET ORAL at 20:32

## 2022-02-17 RX ADMIN — ASPIRIN 81 MG CHEWABLE TABLET 81 MG: 81 TABLET CHEWABLE at 08:45

## 2022-02-17 RX ADMIN — CEFEPIME 2 G: 1 INJECTION, SOLUTION INTRAVENOUS at 00:41

## 2022-02-17 RX ADMIN — SODIUM CHLORIDE, PRESERVATIVE FREE 10 ML: 5 INJECTION INTRAVENOUS at 20:33

## 2022-02-17 RX ADMIN — LEVETIRACETAM 1000 MG: 100 SOLUTION ORAL at 09:01

## 2022-02-18 LAB
ANION GAP SERPL CALCULATED.3IONS-SCNC: 7.4 MMOL/L (ref 5–15)
BACTERIA SPEC RESP CULT: ABNORMAL
BACTERIA SPEC RESP CULT: ABNORMAL
BUN SERPL-MCNC: 19 MG/DL (ref 8–23)
BUN/CREAT SERPL: 31.1 (ref 7–25)
CALCIUM SPEC-SCNC: 9.2 MG/DL (ref 8.6–10.5)
CHLORIDE SERPL-SCNC: 103 MMOL/L (ref 98–107)
CO2 SERPL-SCNC: 26.6 MMOL/L (ref 22–29)
CREAT SERPL-MCNC: 0.61 MG/DL (ref 0.76–1.27)
DEPRECATED RDW RBC AUTO: 53.7 FL (ref 37–54)
ERYTHROCYTE [DISTWIDTH] IN BLOOD BY AUTOMATED COUNT: 14.8 % (ref 12.3–15.4)
GFR SERPL CREATININE-BSD FRML MDRD: 128 ML/MIN/1.73
GLUCOSE SERPL-MCNC: 160 MG/DL (ref 65–99)
GRAM STN SPEC: ABNORMAL
HCT VFR BLD AUTO: 33.2 % (ref 37.5–51)
HGB BLD-MCNC: 10.8 G/DL (ref 13–17.7)
MAGNESIUM SERPL-MCNC: 2.1 MG/DL (ref 1.6–2.4)
MCH RBC QN AUTO: 31.9 PG (ref 26.6–33)
MCHC RBC AUTO-ENTMCNC: 32.5 G/DL (ref 31.5–35.7)
MCV RBC AUTO: 97.9 FL (ref 79–97)
PLATELET # BLD AUTO: 323 10*3/MM3 (ref 140–450)
PMV BLD AUTO: 12.4 FL (ref 6–12)
POTASSIUM SERPL-SCNC: 4.2 MMOL/L (ref 3.5–5.2)
RBC # BLD AUTO: 3.39 10*6/MM3 (ref 4.14–5.8)
SODIUM SERPL-SCNC: 137 MMOL/L (ref 136–145)
WBC NRBC COR # BLD: 12.05 10*3/MM3 (ref 3.4–10.8)

## 2022-02-18 PROCEDURE — 99233 SBSQ HOSP IP/OBS HIGH 50: CPT | Performed by: INTERNAL MEDICINE

## 2022-02-18 PROCEDURE — 92610 EVALUATE SWALLOWING FUNCTION: CPT

## 2022-02-18 PROCEDURE — 80048 BASIC METABOLIC PNL TOTAL CA: CPT | Performed by: FAMILY MEDICINE

## 2022-02-18 PROCEDURE — 97530 THERAPEUTIC ACTIVITIES: CPT

## 2022-02-18 PROCEDURE — 85027 COMPLETE CBC AUTOMATED: CPT | Performed by: INTERNAL MEDICINE

## 2022-02-18 PROCEDURE — 25010000002 VANCOMYCIN 5 G RECONSTITUTED SOLUTION: Performed by: FAMILY MEDICINE

## 2022-02-18 PROCEDURE — 83735 ASSAY OF MAGNESIUM: CPT | Performed by: INTERNAL MEDICINE

## 2022-02-18 RX ORDER — LINEZOLID 600 MG/1
600 TABLET, FILM COATED ORAL EVERY 12 HOURS SCHEDULED
Status: DISCONTINUED | OUTPATIENT
Start: 2022-02-18 | End: 2022-02-24 | Stop reason: HOSPADM

## 2022-02-18 RX ADMIN — METOPROLOL TARTRATE 75 MG: 50 TABLET, FILM COATED ORAL at 09:20

## 2022-02-18 RX ADMIN — VANCOMYCIN HYDROCHLORIDE 1000 MG: 5 INJECTION, POWDER, LYOPHILIZED, FOR SOLUTION INTRAVENOUS at 02:17

## 2022-02-18 RX ADMIN — LEVETIRACETAM 1000 MG: 100 SOLUTION ORAL at 11:08

## 2022-02-18 RX ADMIN — QUETIAPINE FUMARATE 50 MG: 25 TABLET ORAL at 20:03

## 2022-02-18 RX ADMIN — ASPIRIN 81 MG CHEWABLE TABLET 81 MG: 81 TABLET CHEWABLE at 09:20

## 2022-02-18 RX ADMIN — METOPROLOL TARTRATE 75 MG: 50 TABLET, FILM COATED ORAL at 20:03

## 2022-02-18 RX ADMIN — PRIMIDONE 750 MG: 250 TABLET ORAL at 20:03

## 2022-02-18 RX ADMIN — GABAPENTIN 300 MG: 300 CAPSULE ORAL at 23:11

## 2022-02-18 RX ADMIN — LINEZOLID 600 MG: 600 TABLET, FILM COATED ORAL at 11:08

## 2022-02-18 RX ADMIN — LEVETIRACETAM 1000 MG: 100 SOLUTION ORAL at 20:03

## 2022-02-18 RX ADMIN — PANTOPRAZOLE SODIUM 40 MG: 40 INJECTION, POWDER, FOR SOLUTION INTRAVENOUS at 06:28

## 2022-02-18 RX ADMIN — GABAPENTIN 300 MG: 300 CAPSULE ORAL at 06:28

## 2022-02-18 RX ADMIN — GABAPENTIN 300 MG: 300 CAPSULE ORAL at 13:06

## 2022-02-18 RX ADMIN — FINASTERIDE 5 MG: 5 TABLET, FILM COATED ORAL at 09:20

## 2022-02-18 RX ADMIN — LEVOTHYROXINE SODIUM 50 MCG: 50 TABLET ORAL at 06:30

## 2022-02-18 RX ADMIN — LINEZOLID 600 MG: 600 TABLET, FILM COATED ORAL at 20:03

## 2022-02-19 LAB
ANION GAP SERPL CALCULATED.3IONS-SCNC: 9.8 MMOL/L (ref 5–15)
BUN SERPL-MCNC: 17 MG/DL (ref 8–23)
BUN/CREAT SERPL: 29.8 (ref 7–25)
CALCIUM SPEC-SCNC: 9.7 MG/DL (ref 8.6–10.5)
CHLORIDE SERPL-SCNC: 98 MMOL/L (ref 98–107)
CO2 SERPL-SCNC: 28.2 MMOL/L (ref 22–29)
CREAT SERPL-MCNC: 0.57 MG/DL (ref 0.76–1.27)
GFR SERPL CREATININE-BSD FRML MDRD: 138 ML/MIN/1.73
GLUCOSE SERPL-MCNC: 122 MG/DL (ref 65–99)
POTASSIUM SERPL-SCNC: 4.2 MMOL/L (ref 3.5–5.2)
SODIUM SERPL-SCNC: 136 MMOL/L (ref 136–145)

## 2022-02-19 PROCEDURE — 99233 SBSQ HOSP IP/OBS HIGH 50: CPT | Performed by: INTERNAL MEDICINE

## 2022-02-19 PROCEDURE — 80048 BASIC METABOLIC PNL TOTAL CA: CPT | Performed by: FAMILY MEDICINE

## 2022-02-19 RX ADMIN — FINASTERIDE 5 MG: 5 TABLET, FILM COATED ORAL at 09:05

## 2022-02-19 RX ADMIN — LEVOTHYROXINE SODIUM 50 MCG: 50 TABLET ORAL at 06:16

## 2022-02-19 RX ADMIN — GABAPENTIN 300 MG: 300 CAPSULE ORAL at 21:58

## 2022-02-19 RX ADMIN — LINEZOLID 600 MG: 600 TABLET, FILM COATED ORAL at 21:58

## 2022-02-19 RX ADMIN — METOPROLOL TARTRATE 75 MG: 50 TABLET, FILM COATED ORAL at 21:59

## 2022-02-19 RX ADMIN — LINEZOLID 600 MG: 600 TABLET, FILM COATED ORAL at 09:04

## 2022-02-19 RX ADMIN — GABAPENTIN 300 MG: 300 CAPSULE ORAL at 13:29

## 2022-02-19 RX ADMIN — METOPROLOL TARTRATE 75 MG: 50 TABLET, FILM COATED ORAL at 09:04

## 2022-02-19 RX ADMIN — PANTOPRAZOLE SODIUM 40 MG: 40 INJECTION, POWDER, FOR SOLUTION INTRAVENOUS at 06:16

## 2022-02-19 RX ADMIN — LEVETIRACETAM 1000 MG: 100 SOLUTION ORAL at 09:05

## 2022-02-19 RX ADMIN — LEVETIRACETAM 1000 MG: 100 SOLUTION ORAL at 21:58

## 2022-02-19 RX ADMIN — ASPIRIN 81 MG CHEWABLE TABLET 81 MG: 81 TABLET CHEWABLE at 09:04

## 2022-02-19 RX ADMIN — Medication 5 MG: at 21:58

## 2022-02-19 RX ADMIN — GABAPENTIN 300 MG: 300 CAPSULE ORAL at 06:16

## 2022-02-19 RX ADMIN — QUETIAPINE FUMARATE 50 MG: 25 TABLET ORAL at 21:58

## 2022-02-19 RX ADMIN — PRIMIDONE 750 MG: 250 TABLET ORAL at 21:58

## 2022-02-20 LAB
ANION GAP SERPL CALCULATED.3IONS-SCNC: 9.2 MMOL/L (ref 5–15)
BUN SERPL-MCNC: 19 MG/DL (ref 8–23)
BUN/CREAT SERPL: 32.2 (ref 7–25)
CALCIUM SPEC-SCNC: 9.7 MG/DL (ref 8.6–10.5)
CHLORIDE SERPL-SCNC: 98 MMOL/L (ref 98–107)
CO2 SERPL-SCNC: 26.8 MMOL/L (ref 22–29)
CREAT SERPL-MCNC: 0.59 MG/DL (ref 0.76–1.27)
GFR SERPL CREATININE-BSD FRML MDRD: 133 ML/MIN/1.73
GLUCOSE SERPL-MCNC: 141 MG/DL (ref 65–99)
POTASSIUM SERPL-SCNC: 4.3 MMOL/L (ref 3.5–5.2)
SODIUM SERPL-SCNC: 134 MMOL/L (ref 136–145)

## 2022-02-20 PROCEDURE — 80048 BASIC METABOLIC PNL TOTAL CA: CPT | Performed by: FAMILY MEDICINE

## 2022-02-20 PROCEDURE — 99232 SBSQ HOSP IP/OBS MODERATE 35: CPT | Performed by: INTERNAL MEDICINE

## 2022-02-20 RX ADMIN — LEVETIRACETAM 1000 MG: 100 SOLUTION ORAL at 21:06

## 2022-02-20 RX ADMIN — LEVETIRACETAM 1000 MG: 100 SOLUTION ORAL at 08:17

## 2022-02-20 RX ADMIN — LINEZOLID 600 MG: 600 TABLET, FILM COATED ORAL at 21:07

## 2022-02-20 RX ADMIN — GABAPENTIN 300 MG: 300 CAPSULE ORAL at 21:07

## 2022-02-20 RX ADMIN — PRIMIDONE 750 MG: 250 TABLET ORAL at 21:06

## 2022-02-20 RX ADMIN — LINEZOLID 600 MG: 600 TABLET, FILM COATED ORAL at 08:17

## 2022-02-20 RX ADMIN — FINASTERIDE 5 MG: 5 TABLET, FILM COATED ORAL at 08:17

## 2022-02-20 RX ADMIN — GABAPENTIN 300 MG: 300 CAPSULE ORAL at 06:35

## 2022-02-20 RX ADMIN — GABAPENTIN 300 MG: 300 CAPSULE ORAL at 14:52

## 2022-02-20 RX ADMIN — METOPROLOL TARTRATE 75 MG: 50 TABLET, FILM COATED ORAL at 08:17

## 2022-02-20 RX ADMIN — LEVOTHYROXINE SODIUM 50 MCG: 50 TABLET ORAL at 06:35

## 2022-02-20 RX ADMIN — QUETIAPINE FUMARATE 50 MG: 25 TABLET ORAL at 21:07

## 2022-02-20 RX ADMIN — METOPROLOL TARTRATE 75 MG: 50 TABLET, FILM COATED ORAL at 21:07

## 2022-02-20 RX ADMIN — Medication 5 MG: at 21:06

## 2022-02-20 RX ADMIN — ASPIRIN 81 MG CHEWABLE TABLET 81 MG: 81 TABLET CHEWABLE at 08:17

## 2022-02-20 RX ADMIN — PANTOPRAZOLE SODIUM 40 MG: 40 INJECTION, POWDER, FOR SOLUTION INTRAVENOUS at 06:35

## 2022-02-21 LAB
ANION GAP SERPL CALCULATED.3IONS-SCNC: 7.9 MMOL/L (ref 5–15)
BACTERIA SPEC AEROBE CULT: NORMAL
BACTERIA SPEC AEROBE CULT: NORMAL
BUN SERPL-MCNC: 21 MG/DL (ref 8–23)
BUN/CREAT SERPL: 35 (ref 7–25)
CALCIUM SPEC-SCNC: 9.6 MG/DL (ref 8.6–10.5)
CHLORIDE SERPL-SCNC: 99 MMOL/L (ref 98–107)
CO2 SERPL-SCNC: 26.1 MMOL/L (ref 22–29)
CREAT SERPL-MCNC: 0.6 MG/DL (ref 0.76–1.27)
DEPRECATED RDW RBC AUTO: 51.8 FL (ref 37–54)
ERYTHROCYTE [DISTWIDTH] IN BLOOD BY AUTOMATED COUNT: 14.8 % (ref 12.3–15.4)
GFR SERPL CREATININE-BSD FRML MDRD: 130 ML/MIN/1.73
GLUCOSE SERPL-MCNC: 138 MG/DL (ref 65–99)
HCT VFR BLD AUTO: 36.1 % (ref 37.5–51)
HGB BLD-MCNC: 11.9 G/DL (ref 13–17.7)
MCH RBC QN AUTO: 31.9 PG (ref 26.6–33)
MCHC RBC AUTO-ENTMCNC: 33 G/DL (ref 31.5–35.7)
MCV RBC AUTO: 96.8 FL (ref 79–97)
PLATELET # BLD AUTO: 368 10*3/MM3 (ref 140–450)
PMV BLD AUTO: 12.3 FL (ref 6–12)
POTASSIUM SERPL-SCNC: 4.4 MMOL/L (ref 3.5–5.2)
RBC # BLD AUTO: 3.73 10*6/MM3 (ref 4.14–5.8)
SODIUM SERPL-SCNC: 133 MMOL/L (ref 136–145)
WBC NRBC COR # BLD: 11.09 10*3/MM3 (ref 3.4–10.8)

## 2022-02-21 PROCEDURE — 99232 SBSQ HOSP IP/OBS MODERATE 35: CPT | Performed by: INTERNAL MEDICINE

## 2022-02-21 PROCEDURE — 97530 THERAPEUTIC ACTIVITIES: CPT

## 2022-02-21 PROCEDURE — 97110 THERAPEUTIC EXERCISES: CPT

## 2022-02-21 PROCEDURE — 85027 COMPLETE CBC AUTOMATED: CPT | Performed by: INTERNAL MEDICINE

## 2022-02-21 PROCEDURE — 80048 BASIC METABOLIC PNL TOTAL CA: CPT | Performed by: FAMILY MEDICINE

## 2022-02-21 RX ORDER — POLYETHYLENE GLYCOL 3350 17 G/17G
17 POWDER, FOR SOLUTION ORAL DAILY
Status: DISCONTINUED | OUTPATIENT
Start: 2022-02-21 | End: 2022-02-24 | Stop reason: HOSPADM

## 2022-02-21 RX ADMIN — LEVETIRACETAM 1000 MG: 100 SOLUTION ORAL at 21:21

## 2022-02-21 RX ADMIN — PANTOPRAZOLE SODIUM 40 MG: 40 INJECTION, POWDER, FOR SOLUTION INTRAVENOUS at 06:11

## 2022-02-21 RX ADMIN — POLYETHYLENE GLYCOL 3350 17 G: 17 POWDER, FOR SOLUTION ORAL at 08:10

## 2022-02-21 RX ADMIN — LINEZOLID 600 MG: 600 TABLET, FILM COATED ORAL at 21:22

## 2022-02-21 RX ADMIN — GABAPENTIN 300 MG: 300 CAPSULE ORAL at 06:11

## 2022-02-21 RX ADMIN — METOPROLOL TARTRATE 75 MG: 50 TABLET, FILM COATED ORAL at 21:22

## 2022-02-21 RX ADMIN — FINASTERIDE 5 MG: 5 TABLET, FILM COATED ORAL at 08:10

## 2022-02-21 RX ADMIN — QUETIAPINE FUMARATE 50 MG: 25 TABLET ORAL at 21:22

## 2022-02-21 RX ADMIN — GABAPENTIN 300 MG: 300 CAPSULE ORAL at 21:22

## 2022-02-21 RX ADMIN — PRIMIDONE 750 MG: 250 TABLET ORAL at 21:22

## 2022-02-21 RX ADMIN — LINEZOLID 600 MG: 600 TABLET, FILM COATED ORAL at 08:10

## 2022-02-21 RX ADMIN — ASPIRIN 81 MG CHEWABLE TABLET 81 MG: 81 TABLET CHEWABLE at 08:10

## 2022-02-21 RX ADMIN — DOCUSATE SODIUM 50 MG: 50 LIQUID ORAL at 08:09

## 2022-02-21 RX ADMIN — GABAPENTIN 300 MG: 300 CAPSULE ORAL at 13:57

## 2022-02-21 RX ADMIN — LEVOTHYROXINE SODIUM 50 MCG: 50 TABLET ORAL at 06:11

## 2022-02-21 RX ADMIN — Medication 5 MG: at 21:22

## 2022-02-21 RX ADMIN — LEVETIRACETAM 1000 MG: 100 SOLUTION ORAL at 08:09

## 2022-02-21 RX ADMIN — METOPROLOL TARTRATE 75 MG: 50 TABLET, FILM COATED ORAL at 08:10

## 2022-02-22 PROCEDURE — 99232 SBSQ HOSP IP/OBS MODERATE 35: CPT | Performed by: INTERNAL MEDICINE

## 2022-02-22 RX ADMIN — POLYETHYLENE GLYCOL 3350 17 G: 17 POWDER, FOR SOLUTION ORAL at 09:20

## 2022-02-22 RX ADMIN — LINEZOLID 600 MG: 600 TABLET, FILM COATED ORAL at 21:40

## 2022-02-22 RX ADMIN — GABAPENTIN 300 MG: 300 CAPSULE ORAL at 21:41

## 2022-02-22 RX ADMIN — GABAPENTIN 300 MG: 300 CAPSULE ORAL at 06:11

## 2022-02-22 RX ADMIN — GABAPENTIN 300 MG: 300 CAPSULE ORAL at 14:50

## 2022-02-22 RX ADMIN — DOCUSATE SODIUM 50 MG: 50 LIQUID ORAL at 09:19

## 2022-02-22 RX ADMIN — LEVETIRACETAM 1000 MG: 100 SOLUTION ORAL at 21:41

## 2022-02-22 RX ADMIN — Medication 5 MG: at 21:41

## 2022-02-22 RX ADMIN — PANTOPRAZOLE SODIUM 40 MG: 40 INJECTION, POWDER, FOR SOLUTION INTRAVENOUS at 06:11

## 2022-02-22 RX ADMIN — FINASTERIDE 5 MG: 5 TABLET, FILM COATED ORAL at 09:20

## 2022-02-22 RX ADMIN — LEVETIRACETAM 1000 MG: 100 SOLUTION ORAL at 09:19

## 2022-02-22 RX ADMIN — ASPIRIN 81 MG CHEWABLE TABLET 81 MG: 81 TABLET CHEWABLE at 09:20

## 2022-02-22 RX ADMIN — METOPROLOL TARTRATE 75 MG: 50 TABLET, FILM COATED ORAL at 09:19

## 2022-02-22 RX ADMIN — LINEZOLID 600 MG: 600 TABLET, FILM COATED ORAL at 09:20

## 2022-02-22 RX ADMIN — LEVOTHYROXINE SODIUM 50 MCG: 50 TABLET ORAL at 09:20

## 2022-02-22 RX ADMIN — QUETIAPINE FUMARATE 50 MG: 25 TABLET ORAL at 21:40

## 2022-02-22 RX ADMIN — PRIMIDONE 750 MG: 250 TABLET ORAL at 21:41

## 2022-02-22 RX ADMIN — METOPROLOL TARTRATE 75 MG: 50 TABLET, FILM COATED ORAL at 21:41

## 2022-02-23 LAB
ANION GAP SERPL CALCULATED.3IONS-SCNC: 10.6 MMOL/L (ref 5–15)
BUN SERPL-MCNC: 20 MG/DL (ref 8–23)
BUN/CREAT SERPL: 33.3 (ref 7–25)
CALCIUM SPEC-SCNC: 9.8 MG/DL (ref 8.6–10.5)
CHLORIDE SERPL-SCNC: 98 MMOL/L (ref 98–107)
CO2 SERPL-SCNC: 26.4 MMOL/L (ref 22–29)
CREAT SERPL-MCNC: 0.6 MG/DL (ref 0.76–1.27)
DEPRECATED RDW RBC AUTO: 51.4 FL (ref 37–54)
ERYTHROCYTE [DISTWIDTH] IN BLOOD BY AUTOMATED COUNT: 14.7 % (ref 12.3–15.4)
GFR SERPL CREATININE-BSD FRML MDRD: 130 ML/MIN/1.73
GLUCOSE SERPL-MCNC: 126 MG/DL (ref 65–99)
HCT VFR BLD AUTO: 38.8 % (ref 37.5–51)
HGB BLD-MCNC: 13.1 G/DL (ref 13–17.7)
MAGNESIUM SERPL-MCNC: 2 MG/DL (ref 1.6–2.4)
MCH RBC QN AUTO: 32.6 PG (ref 26.6–33)
MCHC RBC AUTO-ENTMCNC: 33.8 G/DL (ref 31.5–35.7)
MCV RBC AUTO: 96.5 FL (ref 79–97)
PLATELET # BLD AUTO: 334 10*3/MM3 (ref 140–450)
PMV BLD AUTO: 11.4 FL (ref 6–12)
POTASSIUM SERPL-SCNC: 4.4 MMOL/L (ref 3.5–5.2)
RBC # BLD AUTO: 4.02 10*6/MM3 (ref 4.14–5.8)
SODIUM SERPL-SCNC: 135 MMOL/L (ref 136–145)
WBC NRBC COR # BLD: 13.57 10*3/MM3 (ref 3.4–10.8)

## 2022-02-23 PROCEDURE — 85027 COMPLETE CBC AUTOMATED: CPT | Performed by: INTERNAL MEDICINE

## 2022-02-23 PROCEDURE — 97110 THERAPEUTIC EXERCISES: CPT

## 2022-02-23 PROCEDURE — 83735 ASSAY OF MAGNESIUM: CPT | Performed by: INTERNAL MEDICINE

## 2022-02-23 PROCEDURE — 99232 SBSQ HOSP IP/OBS MODERATE 35: CPT | Performed by: INTERNAL MEDICINE

## 2022-02-23 PROCEDURE — 80048 BASIC METABOLIC PNL TOTAL CA: CPT | Performed by: INTERNAL MEDICINE

## 2022-02-23 PROCEDURE — 97530 THERAPEUTIC ACTIVITIES: CPT

## 2022-02-23 RX ADMIN — Medication 5 MG: at 21:09

## 2022-02-23 RX ADMIN — QUETIAPINE FUMARATE 50 MG: 25 TABLET ORAL at 21:09

## 2022-02-23 RX ADMIN — FINASTERIDE 5 MG: 5 TABLET, FILM COATED ORAL at 10:16

## 2022-02-23 RX ADMIN — PRIMIDONE 750 MG: 250 TABLET ORAL at 21:09

## 2022-02-23 RX ADMIN — METOPROLOL TARTRATE 75 MG: 50 TABLET, FILM COATED ORAL at 10:15

## 2022-02-23 RX ADMIN — PANTOPRAZOLE SODIUM 40 MG: 40 INJECTION, POWDER, FOR SOLUTION INTRAVENOUS at 06:25

## 2022-02-23 RX ADMIN — POLYETHYLENE GLYCOL 3350 17 G: 17 POWDER, FOR SOLUTION ORAL at 10:16

## 2022-02-23 RX ADMIN — LINEZOLID 600 MG: 600 TABLET, FILM COATED ORAL at 21:09

## 2022-02-23 RX ADMIN — SODIUM CHLORIDE, PRESERVATIVE FREE 10 ML: 5 INJECTION INTRAVENOUS at 10:16

## 2022-02-23 RX ADMIN — LINEZOLID 600 MG: 600 TABLET, FILM COATED ORAL at 10:16

## 2022-02-23 RX ADMIN — LEVETIRACETAM 1000 MG: 100 SOLUTION ORAL at 21:09

## 2022-02-23 RX ADMIN — ASPIRIN 81 MG CHEWABLE TABLET 81 MG: 81 TABLET CHEWABLE at 10:15

## 2022-02-23 RX ADMIN — GABAPENTIN 300 MG: 300 CAPSULE ORAL at 13:09

## 2022-02-23 RX ADMIN — DOCUSATE SODIUM 50 MG: 50 LIQUID ORAL at 10:16

## 2022-02-23 RX ADMIN — GABAPENTIN 300 MG: 300 CAPSULE ORAL at 21:09

## 2022-02-23 RX ADMIN — LEVETIRACETAM 1000 MG: 100 SOLUTION ORAL at 10:15

## 2022-02-23 RX ADMIN — LEVOTHYROXINE SODIUM 50 MCG: 50 TABLET ORAL at 10:15

## 2022-02-23 RX ADMIN — GABAPENTIN 300 MG: 300 CAPSULE ORAL at 06:25

## 2022-02-23 RX ADMIN — METOPROLOL TARTRATE 75 MG: 50 TABLET, FILM COATED ORAL at 21:10

## 2022-02-24 VITALS
OXYGEN SATURATION: 100 % | BODY MASS INDEX: 20.31 KG/M2 | RESPIRATION RATE: 18 BRPM | SYSTOLIC BLOOD PRESSURE: 140 MMHG | DIASTOLIC BLOOD PRESSURE: 63 MMHG | HEART RATE: 51 BPM | HEIGHT: 72 IN | TEMPERATURE: 97.4 F | WEIGHT: 149.91 LBS

## 2022-02-24 LAB
ANION GAP SERPL CALCULATED.3IONS-SCNC: 13 MMOL/L (ref 5–15)
BUN SERPL-MCNC: 21 MG/DL (ref 8–23)
BUN/CREAT SERPL: 38.2 (ref 7–25)
CALCIUM SPEC-SCNC: 9.8 MG/DL (ref 8.6–10.5)
CHLORIDE SERPL-SCNC: 97 MMOL/L (ref 98–107)
CO2 SERPL-SCNC: 21 MMOL/L (ref 22–29)
CREAT SERPL-MCNC: 0.55 MG/DL (ref 0.76–1.27)
DEPRECATED RDW RBC AUTO: 52.7 FL (ref 37–54)
ERYTHROCYTE [DISTWIDTH] IN BLOOD BY AUTOMATED COUNT: 14.8 % (ref 12.3–15.4)
GFR SERPL CREATININE-BSD FRML MDRD: 144 ML/MIN/1.73
GLUCOSE SERPL-MCNC: 116 MG/DL (ref 65–99)
HCT VFR BLD AUTO: 40.1 % (ref 37.5–51)
HGB BLD-MCNC: 13.1 G/DL (ref 13–17.7)
MCH RBC QN AUTO: 31.8 PG (ref 26.6–33)
MCHC RBC AUTO-ENTMCNC: 32.7 G/DL (ref 31.5–35.7)
MCV RBC AUTO: 97.3 FL (ref 79–97)
PLATELET # BLD AUTO: 294 10*3/MM3 (ref 140–450)
PMV BLD AUTO: 10.7 FL (ref 6–12)
POTASSIUM SERPL-SCNC: 4.4 MMOL/L (ref 3.5–5.2)
RBC # BLD AUTO: 4.12 10*6/MM3 (ref 4.14–5.8)
SODIUM SERPL-SCNC: 131 MMOL/L (ref 136–145)
WBC NRBC COR # BLD: 10.85 10*3/MM3 (ref 3.4–10.8)

## 2022-02-24 PROCEDURE — 99239 HOSP IP/OBS DSCHRG MGMT >30: CPT | Performed by: INTERNAL MEDICINE

## 2022-02-24 PROCEDURE — 85027 COMPLETE CBC AUTOMATED: CPT | Performed by: INTERNAL MEDICINE

## 2022-02-24 PROCEDURE — 80048 BASIC METABOLIC PNL TOTAL CA: CPT | Performed by: INTERNAL MEDICINE

## 2022-02-24 RX ORDER — LACTULOSE 10 G/15ML
20 SOLUTION ORAL AS NEEDED
Status: ON HOLD
Start: 2022-02-24 | End: 2022-10-13 | Stop reason: SDUPTHER

## 2022-02-24 RX ORDER — CHOLECALCIFEROL (VITAMIN D3) 125 MCG
5 CAPSULE ORAL NIGHTLY
Status: ON HOLD
Start: 2022-02-24 | End: 2022-10-13 | Stop reason: SDUPTHER

## 2022-02-24 RX ORDER — LEVETIRACETAM 1000 MG/1
1000 TABLET ORAL 2 TIMES DAILY
Start: 2022-02-24 | End: 2022-10-09

## 2022-02-24 RX ORDER — GABAPENTIN 600 MG/1
300 TABLET ORAL 3 TIMES DAILY
Qty: 3 TABLET | Refills: 0 | Status: ON HOLD | OUTPATIENT
Start: 2022-02-24 | End: 2022-09-15

## 2022-02-24 RX ORDER — LEVOTHYROXINE SODIUM 0.05 MG/1
50 TABLET ORAL DAILY
Status: ON HOLD
Start: 2022-02-24 | End: 2022-10-13 | Stop reason: SDUPTHER

## 2022-02-24 RX ORDER — ASPIRIN 81 MG/1
81 TABLET, CHEWABLE ORAL DAILY
Qty: 1 TABLET | Refills: 0 | Status: ON HOLD
Start: 2022-02-24 | End: 2022-10-13 | Stop reason: SDUPTHER

## 2022-02-24 RX ORDER — LINEZOLID 600 MG/1
600 TABLET, FILM COATED ORAL EVERY 12 HOURS SCHEDULED
Qty: 4 TABLET | Refills: 0
Start: 2022-02-24 | End: 2022-02-26

## 2022-02-24 RX ORDER — METOPROLOL TARTRATE 75 MG/1
75 TABLET, FILM COATED ORAL EVERY 12 HOURS SCHEDULED
Start: 2022-02-24 | End: 2022-10-13 | Stop reason: HOSPADM

## 2022-02-24 RX ORDER — QUETIAPINE FUMARATE 50 MG/1
50 TABLET, FILM COATED ORAL NIGHTLY
Status: ON HOLD
Start: 2022-02-24 | End: 2022-10-13 | Stop reason: SDUPTHER

## 2022-02-24 RX ORDER — FINASTERIDE 5 MG/1
5 TABLET, FILM COATED ORAL DAILY
Start: 2022-02-24 | End: 2022-05-23 | Stop reason: SDUPTHER

## 2022-02-24 RX ORDER — PRIMIDONE 250 MG/1
750 TABLET ORAL NIGHTLY
Start: 2022-02-24 | End: 2022-10-09

## 2022-02-24 RX ADMIN — FINASTERIDE 5 MG: 5 TABLET, FILM COATED ORAL at 10:01

## 2022-02-24 RX ADMIN — SODIUM CHLORIDE, PRESERVATIVE FREE 10 ML: 5 INJECTION INTRAVENOUS at 10:01

## 2022-02-24 RX ADMIN — ASPIRIN 81 MG CHEWABLE TABLET 81 MG: 81 TABLET CHEWABLE at 10:01

## 2022-02-24 RX ADMIN — GABAPENTIN 300 MG: 300 CAPSULE ORAL at 06:40

## 2022-02-24 RX ADMIN — METOPROLOL TARTRATE 75 MG: 50 TABLET, FILM COATED ORAL at 10:01

## 2022-02-24 RX ADMIN — LEVOTHYROXINE SODIUM 50 MCG: 50 TABLET ORAL at 06:41

## 2022-02-24 RX ADMIN — PANTOPRAZOLE SODIUM 40 MG: 40 INJECTION, POWDER, FOR SOLUTION INTRAVENOUS at 06:41

## 2022-02-24 RX ADMIN — DOCUSATE SODIUM 50 MG: 50 LIQUID ORAL at 10:01

## 2022-02-24 RX ADMIN — POLYETHYLENE GLYCOL 3350 17 G: 17 POWDER, FOR SOLUTION ORAL at 10:01

## 2022-02-24 RX ADMIN — LEVETIRACETAM 1000 MG: 100 SOLUTION ORAL at 10:01

## 2022-02-24 RX ADMIN — LINEZOLID 600 MG: 600 TABLET, FILM COATED ORAL at 10:01

## 2022-03-14 ENCOUNTER — TELEPHONE (OUTPATIENT)
Dept: UROLOGY | Facility: CLINIC | Age: 82
End: 2022-03-14

## 2022-03-14 NOTE — TELEPHONE ENCOUNTER
Pts wife called and spoke to the [facility] about pts upcoming appt. Pts wife said pt is currently in a rehab facility and wanted to know if she should bring him to his appt on 3/18. Pt was last seen 04/26/21 and was supposed to follow-up 6 months later. He has frequency and history of CIS of the bladder. There is a telephone encounter back in January where pts wife called then and didn't want to reschedule and Dr. Umana said that was okay.     Since pts wife is asking now if she should keep appt, I didn't feel like I should make that call medically. Can you take a look and see if pt should keep his appt on 03/18/22 and call to speak to wife about this?

## 2022-05-22 PROBLEM — R35.0 BENIGN PROSTATIC HYPERPLASIA WITH URINARY FREQUENCY: Status: ACTIVE | Noted: 2022-05-22

## 2022-05-22 PROBLEM — N40.1 BENIGN PROSTATIC HYPERPLASIA WITH URINARY FREQUENCY: Status: ACTIVE | Noted: 2022-05-22

## 2022-05-22 PROBLEM — C67.9 MALIGNANT NEOPLASM OF URINARY BLADDER (HCC): Status: ACTIVE | Noted: 2022-05-22
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
## 2022-05-23 ENCOUNTER — PROCEDURE VISIT (OUTPATIENT)
Dept: UROLOGY | Facility: CLINIC | Age: 82
End: 2022-05-23

## 2022-05-23 DIAGNOSIS — C67.9 MALIGNANT NEOPLASM OF URINARY BLADDER, UNSPECIFIED SITE: ICD-10-CM

## 2022-05-23 DIAGNOSIS — R35.0 BENIGN PROSTATIC HYPERPLASIA WITH URINARY FREQUENCY: Primary | ICD-10-CM

## 2022-05-23 DIAGNOSIS — N40.1 BENIGN PROSTATIC HYPERPLASIA WITH URINARY FREQUENCY: Primary | ICD-10-CM

## 2022-05-23 PROCEDURE — 52000 CYSTOURETHROSCOPY: CPT | Performed by: UROLOGY

## 2022-05-23 RX ORDER — FINASTERIDE 5 MG/1
5 TABLET, FILM COATED ORAL DAILY
Qty: 90 TABLET | Refills: 4 | Status: ON HOLD
Start: 2022-05-23 | End: 2022-10-13 | Stop reason: SDUPTHER

## 2022-09-14 ENCOUNTER — APPOINTMENT (OUTPATIENT)
Dept: CT IMAGING | Facility: HOSPITAL | Age: 82
End: 2022-09-14

## 2022-09-14 ENCOUNTER — HOSPITAL ENCOUNTER (INPATIENT)
Facility: HOSPITAL | Age: 82
LOS: 1 days | Discharge: HOME HEALTH | End: 2022-09-16
Attending: EMERGENCY MEDICINE | Admitting: STUDENT IN AN ORGANIZED HEALTH CARE EDUCATION/TRAINING PROGRAM

## 2022-09-14 ENCOUNTER — APPOINTMENT (OUTPATIENT)
Dept: GENERAL RADIOLOGY | Facility: HOSPITAL | Age: 82
End: 2022-09-14

## 2022-09-14 DIAGNOSIS — R26.2 DIFFICULTY WALKING: ICD-10-CM

## 2022-09-14 DIAGNOSIS — I60.9 SAH (SUBARACHNOID HEMORRHAGE): ICD-10-CM

## 2022-09-14 DIAGNOSIS — S00.03XA CONTUSION OF SCALP, INITIAL ENCOUNTER: Primary | ICD-10-CM
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
## 2022-09-14 DIAGNOSIS — R13.12 OROPHARYNGEAL DYSPHAGIA: ICD-10-CM

## 2022-09-14 DIAGNOSIS — Z78.9 DECREASED ACTIVITIES OF DAILY LIVING (ADL): ICD-10-CM

## 2022-09-14 DIAGNOSIS — J90 PLEURAL EFFUSION: ICD-10-CM

## 2022-09-14 LAB
ALBUMIN SERPL-MCNC: 4.2 G/DL (ref 3.5–5.2)
ALBUMIN/GLOB SERPL: 1.3 G/DL
ALP SERPL-CCNC: 168 U/L (ref 39–117)
ALT SERPL W P-5'-P-CCNC: 24 U/L (ref 1–41)
ANION GAP SERPL CALCULATED.3IONS-SCNC: 7.9 MMOL/L (ref 5–15)
AST SERPL-CCNC: 19 U/L (ref 1–40)
BASOPHILS # BLD AUTO: 0.03 10*3/MM3 (ref 0–0.2)
BASOPHILS NFR BLD AUTO: 0.2 % (ref 0–1.5)
BILIRUB SERPL-MCNC: 0.2 MG/DL (ref 0–1.2)
BUN SERPL-MCNC: 18 MG/DL (ref 8–23)
BUN/CREAT SERPL: 24 (ref 7–25)
CALCIUM SPEC-SCNC: 10 MG/DL (ref 8.6–10.5)
CHLORIDE SERPL-SCNC: 98 MMOL/L (ref 98–107)
CO2 SERPL-SCNC: 32.1 MMOL/L (ref 22–29)
CREAT SERPL-MCNC: 0.75 MG/DL (ref 0.76–1.27)
DEPRECATED RDW RBC AUTO: 48.8 FL (ref 37–54)
EGFRCR SERPLBLD CKD-EPI 2021: 91.8 ML/MIN/1.73
EOSINOPHIL # BLD AUTO: 0.39 10*3/MM3 (ref 0–0.4)
EOSINOPHIL NFR BLD AUTO: 3 % (ref 0.3–6.2)
ERYTHROCYTE [DISTWIDTH] IN BLOOD BY AUTOMATED COUNT: 14.5 % (ref 12.3–15.4)
GLOBULIN UR ELPH-MCNC: 3.2 GM/DL
GLUCOSE SERPL-MCNC: 122 MG/DL (ref 65–99)
HCT VFR BLD AUTO: 41 % (ref 37.5–51)
HGB BLD-MCNC: 13.8 G/DL (ref 13–17.7)
HOLD SPECIMEN: NORMAL
HOLD SPECIMEN: NORMAL
IMM GRANULOCYTES # BLD AUTO: 0.14 10*3/MM3 (ref 0–0.05)
IMM GRANULOCYTES NFR BLD AUTO: 1.1 % (ref 0–0.5)
LARGE PLATELETS: NORMAL
LYMPHOCYTES # BLD AUTO: 2.19 10*3/MM3 (ref 0.7–3.1)
LYMPHOCYTES NFR BLD AUTO: 16.6 % (ref 19.6–45.3)
MAGNESIUM SERPL-MCNC: 2.2 MG/DL (ref 1.6–2.4)
MCH RBC QN AUTO: 31.1 PG (ref 26.6–33)
MCHC RBC AUTO-ENTMCNC: 33.7 G/DL (ref 31.5–35.7)
MCV RBC AUTO: 92.3 FL (ref 79–97)
MONOCYTES # BLD AUTO: 1.61 10*3/MM3 (ref 0.1–0.9)
MONOCYTES NFR BLD AUTO: 12.2 % (ref 5–12)
NEUTROPHILS NFR BLD AUTO: 66.9 % (ref 42.7–76)
NEUTROPHILS NFR BLD AUTO: 8.8 10*3/MM3 (ref 1.7–7)
NRBC BLD AUTO-RTO: 0 /100 WBC (ref 0–0.2)
PLATELET # BLD AUTO: 356 10*3/MM3 (ref 140–450)
PMV BLD AUTO: 11 FL (ref 6–12)
POTASSIUM SERPL-SCNC: 4.4 MMOL/L (ref 3.5–5.2)
PROT SERPL-MCNC: 7.4 G/DL (ref 6–8.5)
RBC # BLD AUTO: 4.44 10*6/MM3 (ref 4.14–5.8)
RBC MORPH BLD: NORMAL
SODIUM SERPL-SCNC: 138 MMOL/L (ref 136–145)
TROPONIN T SERPL-MCNC: 0.02 NG/ML (ref 0–0.03)
WBC MORPH BLD: NORMAL
WBC NRBC COR # BLD: 13.16 10*3/MM3 (ref 3.4–10.8)
WHOLE BLOOD HOLD COAG: NORMAL
WHOLE BLOOD HOLD SPECIMEN: NORMAL

## 2022-09-14 PROCEDURE — 93005 ELECTROCARDIOGRAM TRACING: CPT | Performed by: EMERGENCY MEDICINE

## 2022-09-14 PROCEDURE — 85007 BL SMEAR W/DIFF WBC COUNT: CPT | Performed by: EMERGENCY MEDICINE

## 2022-09-14 PROCEDURE — 83735 ASSAY OF MAGNESIUM: CPT | Performed by: EMERGENCY MEDICINE

## 2022-09-14 PROCEDURE — 93010 ELECTROCARDIOGRAM REPORT: CPT | Performed by: INTERNAL MEDICINE

## 2022-09-14 PROCEDURE — 71260 CT THORAX DX C+: CPT

## 2022-09-14 PROCEDURE — 71045 X-RAY EXAM CHEST 1 VIEW: CPT

## 2022-09-14 PROCEDURE — 85025 COMPLETE CBC W/AUTO DIFF WBC: CPT | Performed by: EMERGENCY MEDICINE

## 2022-09-14 PROCEDURE — 70450 CT HEAD/BRAIN W/O DYE: CPT

## 2022-09-14 PROCEDURE — 80053 COMPREHEN METABOLIC PANEL: CPT | Performed by: EMERGENCY MEDICINE

## 2022-09-14 PROCEDURE — 99285 EMERGENCY DEPT VISIT HI MDM: CPT

## 2022-09-14 PROCEDURE — 0 IOPAMIDOL PER 1 ML: Performed by: EMERGENCY MEDICINE

## 2022-09-14 PROCEDURE — 99284 EMERGENCY DEPT VISIT MOD MDM: CPT

## 2022-09-14 PROCEDURE — 84484 ASSAY OF TROPONIN QUANT: CPT | Performed by: EMERGENCY MEDICINE

## 2022-09-14 RX ORDER — ACETAMINOPHEN 500 MG
1000 TABLET ORAL ONCE
Status: COMPLETED | OUTPATIENT
Start: 2022-09-14 | End: 2022-09-14

## 2022-09-14 RX ORDER — SODIUM CHLORIDE 0.9 % (FLUSH) 0.9 %
10 SYRINGE (ML) INJECTION AS NEEDED
Status: DISCONTINUED | OUTPATIENT
Start: 2022-09-14 | End: 2022-09-16 | Stop reason: HOSPADM

## 2022-09-14 RX ADMIN — ACETAMINOPHEN 1000 MG: 500 TABLET, FILM COATED ORAL at 20:54

## 2022-09-14 RX ADMIN — IOPAMIDOL 100 ML: 755 INJECTION, SOLUTION INTRAVENOUS at 22:13

## 2022-09-14 NOTE — ED NOTES
Pt states he walking into his kitchen and he fell. He does not recall and LOC. Complains of a headache.

## 2022-09-15 ENCOUNTER — APPOINTMENT (OUTPATIENT)
Dept: CT IMAGING | Facility: HOSPITAL | Age: 82
End: 2022-09-15

## 2022-09-15 PROBLEM — I60.9 SAH (SUBARACHNOID HEMORRHAGE) (HCC): Status: ACTIVE | Noted: 2022-09-15

## 2022-09-15 LAB
ALBUMIN SERPL-MCNC: 3.9 G/DL (ref 3.5–5.2)
ALBUMIN/GLOB SERPL: 1.3 G/DL
ALP SERPL-CCNC: 163 U/L (ref 39–117)
ALT SERPL W P-5'-P-CCNC: 22 U/L (ref 1–41)
ANION GAP SERPL CALCULATED.3IONS-SCNC: 10.5 MMOL/L (ref 5–15)
AST SERPL-CCNC: 17 U/L (ref 1–40)
BASOPHILS # BLD AUTO: 0.04 10*3/MM3 (ref 0–0.2)
BASOPHILS NFR BLD AUTO: 0.3 % (ref 0–1.5)
BILIRUB SERPL-MCNC: 0.5 MG/DL (ref 0–1.2)
BUN SERPL-MCNC: 16 MG/DL (ref 8–23)
BUN/CREAT SERPL: 23.5 (ref 7–25)
CALCIUM SPEC-SCNC: 9.3 MG/DL (ref 8.6–10.5)
CHLORIDE SERPL-SCNC: 97 MMOL/L (ref 98–107)
CHOLEST SERPL-MCNC: 122 MG/DL (ref 0–200)
CO2 SERPL-SCNC: 26.5 MMOL/L (ref 22–29)
CREAT SERPL-MCNC: 0.68 MG/DL (ref 0.76–1.27)
DEPRECATED RDW RBC AUTO: 49.2 FL (ref 37–54)
EGFRCR SERPLBLD CKD-EPI 2021: 94.6 ML/MIN/1.73
EOSINOPHIL # BLD AUTO: 0.13 10*3/MM3 (ref 0–0.4)
EOSINOPHIL NFR BLD AUTO: 1 % (ref 0.3–6.2)
ERYTHROCYTE [DISTWIDTH] IN BLOOD BY AUTOMATED COUNT: 14.6 % (ref 12.3–15.4)
GLOBULIN UR ELPH-MCNC: 3 GM/DL
GLUCOSE BLDC GLUCOMTR-MCNC: 117 MG/DL (ref 70–99)
GLUCOSE BLDC GLUCOMTR-MCNC: 99 MG/DL (ref 70–99)
GLUCOSE SERPL-MCNC: 123 MG/DL (ref 65–99)
HBA1C MFR BLD: 6.5 % (ref 4.8–5.6)
HCT VFR BLD AUTO: 39.2 % (ref 37.5–51)
HDLC SERPL-MCNC: 49 MG/DL (ref 40–60)
HGB BLD-MCNC: 13.1 G/DL (ref 13–17.7)
IMM GRANULOCYTES # BLD AUTO: 0.05 10*3/MM3 (ref 0–0.05)
IMM GRANULOCYTES NFR BLD AUTO: 0.4 % (ref 0–0.5)
LDLC SERPL CALC-MCNC: 59 MG/DL (ref 0–100)
LDLC/HDLC SERPL: 1.2 {RATIO}
LIPASE SERPL-CCNC: 17 U/L (ref 13–60)
LYMPHOCYTES # BLD AUTO: 1.92 10*3/MM3 (ref 0.7–3.1)
LYMPHOCYTES NFR BLD AUTO: 14.4 % (ref 19.6–45.3)
MAGNESIUM SERPL-MCNC: 2.1 MG/DL (ref 1.6–2.4)
MCH RBC QN AUTO: 31.1 PG (ref 26.6–33)
MCHC RBC AUTO-ENTMCNC: 33.4 G/DL (ref 31.5–35.7)
MCV RBC AUTO: 93.1 FL (ref 79–97)
MONOCYTES # BLD AUTO: 1.4 10*3/MM3 (ref 0.1–0.9)
MONOCYTES NFR BLD AUTO: 10.5 % (ref 5–12)
NEUTROPHILS NFR BLD AUTO: 73.4 % (ref 42.7–76)
NEUTROPHILS NFR BLD AUTO: 9.8 10*3/MM3 (ref 1.7–7)
NRBC BLD AUTO-RTO: 0 /100 WBC (ref 0–0.2)
NT-PROBNP SERPL-MCNC: 296.7 PG/ML (ref 0–1800)
PLATELET # BLD AUTO: 344 10*3/MM3 (ref 140–450)
PMV BLD AUTO: 11.1 FL (ref 6–12)
POTASSIUM SERPL-SCNC: 4.2 MMOL/L (ref 3.5–5.2)
PROT SERPL-MCNC: 6.9 G/DL (ref 6–8.5)
RBC # BLD AUTO: 4.21 10*6/MM3 (ref 4.14–5.8)
RBC MORPH BLD: NORMAL
SMALL PLATELETS BLD QL SMEAR: ADEQUATE
SODIUM SERPL-SCNC: 134 MMOL/L (ref 136–145)
TRIGL SERPL-MCNC: 70 MG/DL (ref 0–150)
VLDLC SERPL-MCNC: 14 MG/DL (ref 5–40)
WBC MORPH BLD: NORMAL
WBC NRBC COR # BLD: 13.34 10*3/MM3 (ref 3.4–10.8)

## 2022-09-15 PROCEDURE — 83036 HEMOGLOBIN GLYCOSYLATED A1C: CPT | Performed by: FAMILY MEDICINE

## 2022-09-15 PROCEDURE — 25010000002 MORPHINE PER 10 MG: Performed by: EMERGENCY MEDICINE

## 2022-09-15 PROCEDURE — 83690 ASSAY OF LIPASE: CPT | Performed by: FAMILY MEDICINE

## 2022-09-15 PROCEDURE — 97161 PT EVAL LOW COMPLEX 20 MIN: CPT

## 2022-09-15 PROCEDURE — 70450 CT HEAD/BRAIN W/O DYE: CPT

## 2022-09-15 PROCEDURE — 85007 BL SMEAR W/DIFF WBC COUNT: CPT | Performed by: FAMILY MEDICINE

## 2022-09-15 PROCEDURE — 80061 LIPID PANEL: CPT | Performed by: FAMILY MEDICINE

## 2022-09-15 PROCEDURE — 99291 CRITICAL CARE FIRST HOUR: CPT | Performed by: INTERNAL MEDICINE

## 2022-09-15 PROCEDURE — 99223 1ST HOSP IP/OBS HIGH 75: CPT | Performed by: FAMILY MEDICINE

## 2022-09-15 PROCEDURE — 74176 CT ABD & PELVIS W/O CONTRAST: CPT

## 2022-09-15 PROCEDURE — 83735 ASSAY OF MAGNESIUM: CPT | Performed by: FAMILY MEDICINE

## 2022-09-15 PROCEDURE — 92610 EVALUATE SWALLOWING FUNCTION: CPT

## 2022-09-15 PROCEDURE — 80053 COMPREHEN METABOLIC PANEL: CPT | Performed by: FAMILY MEDICINE

## 2022-09-15 PROCEDURE — 85025 COMPLETE CBC W/AUTO DIFF WBC: CPT | Performed by: FAMILY MEDICINE

## 2022-09-15 PROCEDURE — 99221 1ST HOSP IP/OBS SF/LOW 40: CPT | Performed by: NEUROLOGICAL SURGERY

## 2022-09-15 PROCEDURE — 83880 ASSAY OF NATRIURETIC PEPTIDE: CPT | Performed by: INTERNAL MEDICINE

## 2022-09-15 PROCEDURE — 82962 GLUCOSE BLOOD TEST: CPT

## 2022-09-15 PROCEDURE — 97165 OT EVAL LOW COMPLEX 30 MIN: CPT

## 2022-09-15 RX ORDER — LABETALOL HYDROCHLORIDE 5 MG/ML
10 INJECTION, SOLUTION INTRAVENOUS
Status: DISCONTINUED | OUTPATIENT
Start: 2022-09-15 | End: 2022-09-16 | Stop reason: HOSPADM

## 2022-09-15 RX ORDER — DIAPER,BRIEF,INFANT-TODD,DISP
1 EACH MISCELLANEOUS 3 TIMES DAILY
COMMUNITY
End: 2022-10-09

## 2022-09-15 RX ORDER — ACETAMINOPHEN 325 MG/1
650 TABLET ORAL ONCE AS NEEDED
Status: COMPLETED | OUTPATIENT
Start: 2022-09-15 | End: 2022-09-16

## 2022-09-15 RX ORDER — TRAMADOL HYDROCHLORIDE 50 MG/1
50 TABLET ORAL EVERY 4 HOURS PRN
Status: DISCONTINUED | OUTPATIENT
Start: 2022-09-15 | End: 2022-09-16 | Stop reason: HOSPADM

## 2022-09-15 RX ORDER — QUETIAPINE FUMARATE 25 MG/1
50 TABLET, FILM COATED ORAL NIGHTLY
Status: DISCONTINUED | OUTPATIENT
Start: 2022-09-15 | End: 2022-09-16 | Stop reason: HOSPADM

## 2022-09-15 RX ORDER — ACETAMINOPHEN 500 MG
1000 TABLET ORAL ONCE
Status: COMPLETED | OUTPATIENT
Start: 2022-09-15 | End: 2022-09-15

## 2022-09-15 RX ORDER — TRAMADOL HYDROCHLORIDE 50 MG/1
50 TABLET ORAL EVERY 6 HOURS PRN
Status: DISCONTINUED | OUTPATIENT
Start: 2022-09-15 | End: 2022-09-15

## 2022-09-15 RX ORDER — FINASTERIDE 5 MG/1
5 TABLET, FILM COATED ORAL DAILY
Status: DISCONTINUED | OUTPATIENT
Start: 2022-09-15 | End: 2022-09-16 | Stop reason: HOSPADM

## 2022-09-15 RX ORDER — ONDANSETRON 2 MG/ML
4 INJECTION INTRAMUSCULAR; INTRAVENOUS ONCE
Status: DISCONTINUED | OUTPATIENT
Start: 2022-09-15 | End: 2022-09-16 | Stop reason: HOSPADM

## 2022-09-15 RX ORDER — NYSTATIN 100000 U/G
1 CREAM TOPICAL 3 TIMES DAILY
COMMUNITY
End: 2022-10-09

## 2022-09-15 RX ORDER — MORPHINE SULFATE 2 MG/ML
2 INJECTION, SOLUTION INTRAMUSCULAR; INTRAVENOUS ONCE
Status: COMPLETED | OUTPATIENT
Start: 2022-09-15 | End: 2022-09-15

## 2022-09-15 RX ORDER — SODIUM CHLORIDE 0.9 % (FLUSH) 0.9 %
10 SYRINGE (ML) INJECTION AS NEEDED
Status: DISCONTINUED | OUTPATIENT
Start: 2022-09-15 | End: 2022-09-16 | Stop reason: HOSPADM

## 2022-09-15 RX ORDER — DOCUSATE SODIUM 50 MG/5 ML
100 LIQUID (ML) ORAL 2 TIMES DAILY
COMMUNITY
End: 2022-10-09

## 2022-09-15 RX ORDER — GABAPENTIN 250 MG/5ML
250 SOLUTION ORAL 3 TIMES DAILY PRN
COMMUNITY
End: 2022-10-13 | Stop reason: HOSPADM

## 2022-09-15 RX ORDER — SODIUM CHLORIDE 0.9 % (FLUSH) 0.9 %
10 SYRINGE (ML) INJECTION EVERY 12 HOURS SCHEDULED
Status: DISCONTINUED | OUTPATIENT
Start: 2022-09-15 | End: 2022-09-16 | Stop reason: HOSPADM

## 2022-09-15 RX ORDER — LEVOTHYROXINE SODIUM 0.05 MG/1
50 TABLET ORAL
Status: DISCONTINUED | OUTPATIENT
Start: 2022-09-15 | End: 2022-09-16 | Stop reason: HOSPADM

## 2022-09-15 RX ORDER — LACTULOSE 10 G/15ML
20 SOLUTION ORAL AS NEEDED
Status: DISCONTINUED | OUTPATIENT
Start: 2022-09-15 | End: 2022-09-16 | Stop reason: HOSPADM

## 2022-09-15 RX ORDER — SODIUM CHLORIDE 9 MG/ML
40 INJECTION, SOLUTION INTRAVENOUS AS NEEDED
Status: DISCONTINUED | OUTPATIENT
Start: 2022-09-15 | End: 2022-09-16 | Stop reason: HOSPADM

## 2022-09-15 RX ORDER — MUPIROCIN CALCIUM 20 MG/G
1 CREAM TOPICAL 3 TIMES DAILY
COMMUNITY
End: 2022-10-09

## 2022-09-15 RX ADMIN — Medication 10 ML: at 09:21

## 2022-09-15 RX ADMIN — TRAMADOL HYDROCHLORIDE 50 MG: 50 TABLET, COATED ORAL at 09:17

## 2022-09-15 RX ADMIN — QUETIAPINE FUMARATE 50 MG: 25 TABLET ORAL at 21:43

## 2022-09-15 RX ADMIN — ACETAMINOPHEN 1000 MG: 500 TABLET ORAL at 04:42

## 2022-09-15 RX ADMIN — Medication 10 ML: at 21:45

## 2022-09-15 RX ADMIN — TRAMADOL HYDROCHLORIDE 50 MG: 50 TABLET, COATED ORAL at 14:11

## 2022-09-15 RX ADMIN — LABETALOL 20 MG/4 ML (5 MG/ML) INTRAVENOUS SYRINGE 10 MG: at 14:11

## 2022-09-15 RX ADMIN — TRAMADOL HYDROCHLORIDE 50 MG: 50 TABLET, COATED ORAL at 21:43

## 2022-09-15 RX ADMIN — MORPHINE SULFATE 2 MG: 2 INJECTION, SOLUTION INTRAMUSCULAR; INTRAVENOUS at 01:17

## 2022-09-15 RX ADMIN — LABETALOL 20 MG/4 ML (5 MG/ML) INTRAVENOUS SYRINGE 10 MG: at 17:19

## 2022-09-15 RX ADMIN — LEVOTHYROXINE SODIUM 50 MCG: 50 TABLET ORAL at 06:07

## 2022-09-15 RX ADMIN — FINASTERIDE 5 MG: 5 TABLET, FILM COATED ORAL at 09:17

## 2022-09-15 RX ADMIN — LABETALOL 20 MG/4 ML (5 MG/ML) INTRAVENOUS SYRINGE 10 MG: at 07:53

## 2022-09-15 NOTE — THERAPY EVALUATION
Acute Care - Physical Therapy Initial Evaluation  JOCELYN Butterfield     Patient Name: Wood Desai  : 1940  MRN: [id number]  Today's Date: 9/15/2022      Visit Dx:     ICD-10-CM ICD-9-CM   1. Contusion of scalp, initial encounter  S00.03XA 920   2. Fall, initial encounter  W19.XXXA E888.9   3. Pleural effusion  J90 511.9   4. SAH (subarachnoid hemorrhage) (HCC)  I60.9 430   5. Oropharyngeal dysphagia  R13.12 787.22   6. Difficulty walking  R26.2 719.7     Patient Active Problem List   Diagnosis   • Acute cystitis   • BPH (benign prostatic hyperplasia)   • Gout   • High blood pressure   • Coronary artery disease involving native heart without angina pectoris   • Pneumonia of left lung due to infectious organism   • Elevated troponin   • Benign prostatic hyperplasia with urinary frequency   • Malignant neoplasm of urinary bladder (HCC)   • SAH (subarachnoid hemorrhage) (HCC)     Past Medical History:   Diagnosis Date   • Acute cystitis 2018   • BPH (benign prostatic hyperplasia)    • Brain bleed (HCC)    • Broken ankle    • Broken neck (HCC)    • Coronary artery disease involving native heart without angina pectoris 2022   • Gout    • High blood pressure    • Myocardial infarction (HCC)      Past Surgical History:   Procedure Laterality Date   • COLON SURGERY     • CRANIOTOMY     • CYSTOSCOPY     • EXTERNAL EAR SURGERY     • HERNIA REPAIR     • NECK SURGERY     • NOSE SURGERY     • SHOULDER SURGERY     • SKIN CANCER EXCISION     • SPLENECTOMY       PT Assessment (last 12 hours)     PT Evaluation and Treatment     Row Name 09/15/22 1500 09/15/22 0700       Physical Therapy Time and Intention    Subjective Information complains of;pain  back secondary to fall  -AV --    Document Type evaluation  -AV --    Mode of Treatment individual therapy;physical therapy  -AV --    Session Not Performed -- other (see comments)  pending neuro consult  -AV    Patient Effort good  -AV --    Row Name 09/15/22 1500           General Information    Patient Profile Reviewed yes  -AV     Patient Observations alert;cooperative;agree to therapy  -AV     Prior Level of Function --  Spouse assists with all ADLs. Ambulated with rolling walker. No home O2. Current with home health PT.  -AV     Equipment Currently Used at Home walker, rolling  -AV     Existing Precautions/Restrictions fall  -AV     Row Name 09/15/22 1500          Living Environment    Current Living Arrangements home  -AV     Home Accessibility wheelchair accessible  Ramp  -AV     People in Home spouse  -AV     Row Name 09/15/22 1500          Cognition    Orientation Status (Cognition) oriented x 4  -AV     Row Name 09/15/22 1500          Range of Motion (ROM)    Range of Motion bilateral lower extremities;ROM is WFL  -AV     Row Name 09/15/22 1500          Strength (Manual Muscle Testing)    Strength (Manual Muscle Testing) bilateral lower extremities  4-/5  -AV     Row Name 09/15/22 1500          Bed Mobility    Bed Mobility supine-sit;sit-supine  -AV     Supine-Sit Scott (Bed Mobility) minimum assist (75% patient effort)  -AV     Sit-Supine [place] (Bed Mobility) contact guard  -AV     Row Name 09/15/22 1500          Transfers    Transfers sit-stand transfer;stand-sit transfer  -AV     Sit-Stand Scott (Transfers) contact guard;verbal cues;1 person assist  -AV     Stand-Sit Scott (Transfers) contact guard;verbal cues;1 person assist  -AV     Row Name 09/15/22 1500          Sit-Stand Transfer    Assistive Device (Sit-Stand Transfers) walker, front-wheeled  -AV     Row Name 09/15/22 1500          Stand-Sit Transfer    Assistive Device (Stand-Sit Transfers) walker, front-wheeled  -AV     Row Name 09/15/22 1500          Gait/Stairs (Locomotion)    Gait/Stairs Locomotion gait/ambulation independence;gait/ambulation assistive device;distance ambulated  -AV     [place] Level (Gait) contact guard  -AV     Assistive Device (Gait) walker,  front-wheeled  -AV     Distance in Feet (Gait) 30  -AV     Pattern (Gait) step-to  -AV     Deviations/Abnormal Patterns (Gait) base of support, narrow;gait speed decreased;stride length decreased  -AV     Bilateral Gait Deviations forward flexed posture  -AV     Row Name 09/15/22 1500          Safety Issues, Functional Mobility    Impairments Affecting Function (Mobility) balance;endurance/activity tolerance;pain;strength  -AV     Row Name 09/15/22 1500          Balance    Balance Assessment standing dynamic balance  -AV     Dynamic Standing Balance contact guard  -AV     Position/Device Used, Standing Balance supported;walker, front-wheeled  -AV     Row Name             Wound 09/15/22 0400 medial coccyx Pressure Injury    Wound - Properties Group Placement Date: 09/15/22  -EG Placement Time: 0400  -EG Present on Hospital Admission: Y  -EG Orientation: medial  -EG Location: coccyx  -EG Primary Wound Type: Pressure inj  -EG Additional Comments: healing  -EG     Retired Wound - Properties Group Placement Date: 09/15/22  -EG Placement Time: 0400  -EG Present on Hospital Admission: Y  -EG Orientation: medial  -EG Location: coccyx  -EG Primary Wound Type: Pressure inj  -EG Additional Comments: healing  -EG     Retired Wound - Properties Group Date first assessed: 09/15/22  -EG Time first assessed: 0400  -EG Present on Hospital Admission: Y  -EG Location: coccyx  -EG Primary Wound Type: Pressure inj  -EG Additional Comments: healing  -EG     Row Name 09/15/22 1500          Plan of Care Review    Plan of Care Reviewed With patient;spouse  -AV     Progress no change  -AV     Outcome Evaluation Patient presents with deficits in balance, endurance, transfers, and ambulation. Patient will benefit from skilled PT services to address these mobility deficits and decrease risk of falls. Recommend return home with home health and assist from spouse following hospital discharge.  -AV     Row Name 09/15/22 1500          Therapy  Assessment/Plan (PT)    Rehab Potential (PT) good, to achieve stated therapy goals  -AV     Criteria for Skilled Interventions Met (PT) yes;meets criteria  -AV     Therapy Frequency (PT) daily  -AV     Problem List (PT) problems related to;balance;mobility;pain;strength  -AV     Activity Limitations Related to Problem List (PT) unable to transfer safely;unable to ambulate safely  -AV     Row Name 09/15/22 1500          PT Evaluation Complexity    History, PT Evaluation Complexity 1-2 personal factors and/or comorbidities  -AV     Examination of Body Systems (PT Eval Complexity) total of 4 or more elements  -AV     Clinical Presentation (PT Evaluation Complexity) stable  -AV     Clinical Decision Making (PT Evaluation Complexity) low complexity  -AV     Overall Complexity (PT Evaluation Complexity) low complexity  -AV     Row Name 09/15/22 1500          Therapy Plan Review/Discharge Plan (PT)    Therapy Plan Review (PT) evaluation/treatment results reviewed;participants voiced agreement with care plan;patient;spouse/significant other  -AV     Row Name 09/15/22 1500          Physical Therapy Goals    Bed Mobility Goal Selection (PT) bed mobility, PT goal 1  -AV     Transfer Goal Selection (PT) transfer, PT goal 1  -AV     Gait Training Goal Selection (PT) gait training, PT goal 1  -AV     Row Name 09/15/22 1500          Bed Mobility Goal 1 (PT)    Activity/Assistive Device (Bed Mobility Goal 1, PT) sit to supine/supine to sit  -AV     [place] Level/Cues Needed (Bed Mobility Goal 1, PT) standby assist  -AV     Time Frame (Bed Mobility Goal 1, PT) 10 days  -AV     Row Name 09/15/22 1500          Transfer Goal 1 (PT)    Activity/Assistive Device (Transfer Goal 1, PT) sit-to-stand/stand-to-sit;bed-to-chair/chair-to-bed;walker, rolling  -AV     [place] Level/Cues Needed (Transfer Goal 1, PT) standby assist  -AV     Time Frame (Transfer Goal 1, PT) 10 days  -AV     Row Name 09/15/22 1500          Gait Training  Goal 1 (PT)    Activity/Assistive Device (Gait Training Goal 1, PT) gait (walking locomotion);assistive device use;walker, rolling  -AV     [place] Level (Gait Training Goal 1, PT) standby assist  -AV     Distance (Gait Training Goal 1, PT) 150  -AV     Time Frame (Gait Training Goal 1, PT) 10 days  -AV           User Key  (r) = Recorded By, (t) = Taken By, (c) = Cosigned By    Initials Name Provider Type    Geraldine Bonner, RN Registered Nurse    Ihsan Alvarez, PT Physical Therapist                Physical Therapy Education                 Title: PT OT SLP Therapies (In Progress)     Topic: Physical Therapy (In Progress)     Point: Mobility training (Done)     Learning Progress Summary           Patient Acceptance, E,TB, VU by AV at 9/15/2022 1511                   Point: Home exercise program (Not Started)     Learner Progress:  Not documented in this visit.          Point: Body mechanics (Done)     Learning Progress Summary           Patient Acceptance, E,TB, VU by AV at 9/15/2022 1511                   Point: Precautions (Done)     Learning Progress Summary           Patient Acceptance, E,TB, VU by AV at 9/15/2022 1511                               User Key     Initials Effective Dates Name Provider Type Discipline     06/11/21 -  Ihsan Harris, DEBBIE Physical Therapist PT              PT Recommendation and Plan  Anticipated Discharge Disposition (PT): home with home health  Planned Therapy Interventions (PT): balance training, bed mobility training, gait training, neuromuscular re-education, strengthening, transfer training  Therapy Frequency (PT): daily  Plan of Care Reviewed With: patient, spouse  Progress: no change  Outcome Evaluation: Patient presents with deficits in balance, endurance, transfers, and ambulation. Patient will benefit from skilled PT services to address these mobility deficits and decrease risk of falls. Recommend return home with home health and assist from spouse  following hospital discharge.   Outcome Measures     Row Name 09/15/22 1500             How much help from another person do you currently need...    Turning from your back to your side while in flat bed without using bedrails? 3  -AV      Moving from lying on back to sitting on the side of a flat bed without bedrails? 3  -AV      Moving to and from a bed to a chair (including a wheelchair)? 3  -AV      Standing up from a chair using your arms (e.g., wheelchair, bedside chair)? 3  -AV      Climbing 3-5 steps with a railing? 2  -AV      To walk in hospital room? 3  -AV      AM-PAC 6 Clicks Score (PT) 17  -AV              Functional Assessment    Outcome Measure Options AM-PAC 6 Clicks Basic Mobility (PT)  -AV            User Key  (r) = Recorded By, (t) = Taken By, (c) = Cosigned By    Initials Name Provider Type    AV Ihsan Harris, PT Physical Therapist                 Time Calculation:    PT Charges     Row Name 09/15/22 1509 09/15/22 0745          Time Calculation    PT Received On 09/15/22  -AV 09/15/22  -AV     PT Goal Re-Cert Due Date 09/24/22  -AV --            Untimed Charges    PT Eval/Re-eval Minutes 35  -AV --            Total Minutes    Untimed Charges Total Minutes 35  -AV --      Total Minutes 35  -AV --           User Key  (r) = Recorded By, (t) = Taken By, (c) = Cosigned By    Initials Name Provider Type    AV Ihsan Harris, PT Physical Therapist              Therapy Charges for Today     Code Description Service Date Service Provider Modifiers Qty    02419434650 HC PT EVAL LOW COMPLEXITY 3 9/15/2022 Ihsan Harris, PT GP 1          PT G-Codes  Outcome Measure Options: AM-PAC 6 Clicks Basic Mobility (PT)  AM-PAC 6 Clicks Score (PT): 17    Ihsan Harris, PT  9/15/2022

## 2022-09-15 NOTE — PLAN OF CARE
Goal Outcome Evaluation:  Plan of Care Reviewed With: patient, spouse        Progress: no change  Outcome Evaluation: Patient has experienced decline in function from baseline status, presenting w/ deficits related to functional balance, activity tolerance, strength, transfers and mobility that impede patient independence with activities of daily living.  Patient would benefit from skilled Occupational Therapy intervention to maxamize patient safety, and promote return to baseline independence. Recommend home with home health and continued assist from wife at time of discharge.

## 2022-09-15 NOTE — H&P
[facility] HISTORY AND PHYSICAL  Date: 9/15/2022   Patient Name: Wood Desai  : 1940  MRN: [id number]  Primary Care Physician:  Lea Fallon MD  Date of admission: 2022    Subjective   Subjective     Chief Complaint: fall    HPI:    Wood Desai is a 81 y.o. male presents the hospital after suffering a fall at home.  The patient fell in his own home and apparently hit his head against the cabinet.  Patient does not recall the events.  He did lose consciousness for short time and his wife was able to arouse him.  He did not have any seizure-like activity noted.  He is not on any blood thinners but he does have a history of intracranial hemorrhage with craniotomy in the past.  He was brought to the emergency department where CT scan of the head did reveal a small acute intracranial hemorrhage representing subarachnoid hemorrhage in the right frontal lobe suspected due to contrecoup injury.  He was also noted to have a moderate pleural effusion particularly on the left with associated atelectasis.    Update: Patient developed acute abdominal pain after arrival to the floor.  Pain is located in the center of the abdomen does not radiate.  Sharp in nature.  He is able to empty his bladder.    Personal History     Past Medical History:  Past Medical History:   Diagnosis Date   • Acute cystitis 2018   • BPH (benign prostatic hyperplasia)    • Brain bleed (HCC)    • Broken ankle    • Broken neck (HCC)    • Coronary artery disease involving native heart without angina pectoris 2022   • Gout    • High blood pressure    • Myocardial infarction (HCC)          Past Surgical History:  Past Surgical History:   Procedure Laterality Date   • COLON SURGERY     • CRANIOTOMY     • CYSTOSCOPY     • EXTERNAL EAR SURGERY     • HERNIA REPAIR     • NECK SURGERY     • NOSE SURGERY     • SHOULDER SURGERY     • SKIN CANCER EXCISION     • SPLENECTOMY           Family History:   Family  History   Problem Relation Age of Onset   • Ulcers Mother    • Lung cancer Father          Social History:   Social History     Tobacco Use   • Smoking status: Never Smoker   • Smokeless tobacco: Never Used   Substance Use Topics   • Alcohol use: Never         Home Medications:  Metoprolol Tartrate, QUEtiapine, aspirin, finasteride, gabapentin, jevity 1.5 edward, lactulose, lansoprazole 3 mg/mL in sodium bicarbonate injection 8.4%, levETIRAcetam, levothyroxine, melatonin, potassium phosphate (monobasic), and primidone    Allergies:  No Known Allergies    Review of Systems   All systems were reviewed and negative except for: noted above or in HPI    Objective   Objective     Vitals:   Temp:  [97.8 °F (36.6 °C)-98 °F (36.7 °C)] 97.8 °F (36.6 °C)  Heart Rate:  [72-76] 76  Resp:  [16-20] 16  BP: (143-155)/(82-96) 143/96    Physical Exam    Constitutional: Awake, alert, no acute distress   Eyes: Pupils equal, sclerae anicteric, no conjunctival injection   HENT: NCAT, mucous membranes moist   Neck: Supple, no thyromegaly, no lymphadenopathy, trachea midline   Respiratory: Diminished to auscultation bilaterally, nonlabored respirations    Cardiovascular: RRR, no murmurs, rubs, or gallops, palpable pedal pulses bilaterally   Gastrointestinal: Positive bowel sounds, soft, tender, nondistended   Musculoskeletal: 2+ bilateral ankle edema, no clubbing or cyanosis to extremities   Psychiatric: Appropriate affect, cooperative   Neurologic: Oriented x 3, strength diminished but symmetric in all extremities, Cranial Nerves grossly intact to confrontation, speech clear, masked facies, resting tremor   Skin: No rashes           Assessment & Plan   Assessment / Plan     Assessment/Plan:   • Fall  • Subarachnoid hemorrhage  • Pleural effusion  • Headache  • Central abdominal pain.  This appeared acutely after arrival to the floor.    The patient will be admitted for further evaluation and treatment.    Subarachnoid hemorrhage noted on CT  scan of the brain.    Neurosurgery has been consulted from ER  Repeat CT scan of the head in the morning  Perform CT scan of the abdomen now due to his profound abdominal pain  We will perform neuro checks and have speech therapy evaluate the patient for a swallowing and speech evaluation.    Physical therapy occupational therapy have also been consulted.    Blood pressure control with as needed labetalol  Fall precautions  The patient will be placed in ICU for evaluation of their cardiac rhythm.  DVT prophylaxis:  Mechanical DVT prophylaxis orders are present.      Admission Status:  I believe this patient meets inpatient status.    Electronically signed by Kt Clemons DO, 09/15/22, 1:29 AM EDT.

## 2022-09-15 NOTE — CONSULTS
[facility]   Neurosurgery Consult    Patient Name:Wood Desai  : 1940  MRN: [id number]  Primary Care Physician: Lea Fallon MD  Date of admission: 2022    Subjective   Subjective     Chief Complaint: Status post fall with small amount of subarachnoid hemorrhage    History of Present Illness   Wood Desai is a 81 y.o. male with complicated history.  Apparently has had a previous craniotomy and about the last year.  He had trauma also injuring his previous surgical fusion of the neck.  He apparently has had balance issues dating back to that time.  He had a fall with brief loss of consciousness yesterday.  He had a CT scan performed in the ER which demonstrated a small amount of subarachnoid hemorrhage.  I am consulted for this.  He reports his headache is much improved.  No new weakness or numbness.  He has had right-sided weakness dating back to the first neck surgery.  His memory is fine.    Review of Systems   Neurological: Positive for weakness and headaches.         Personal History     Past Medical History:   Diagnosis Date   • Acute cystitis 2018   • BPH (benign prostatic hyperplasia)    • Brain bleed (HCC)    • Broken ankle    • Broken neck (HCC)    • Coronary artery disease involving native heart without angina pectoris 2022   • Gout    • High blood pressure    • Myocardial infarction (HCC)        Past Surgical History:   Procedure Laterality Date   • COLON SURGERY     • CRANIOTOMY     • CYSTOSCOPY     • EXTERNAL EAR SURGERY     • HERNIA REPAIR     • NECK SURGERY     • NOSE SURGERY     • SHOULDER SURGERY     • SKIN CANCER EXCISION     • SPLENECTOMY         Family History: His family history includes Lung cancer in his father; Ulcers in his mother.     Social History: He  reports that he has never smoked. He has never used smokeless tobacco. He reports that he does not drink alcohol. No history on file for drug use.    Home Medications:  Metoprolol Tartrate,  QUEtiapine, aspirin, finasteride, gabapentin, jevity 1.5 edward, lactulose, lansoprazole 3 mg/mL in sodium bicarbonate injection 8.4%, levETIRAcetam, levothyroxine, melatonin, potassium phosphate (monobasic), and primidone    Allergies:  He has No Known Allergies.    Objective    Objective     Vitals:    Temp:  [97.7 °F (36.5 °C)-98 °F (36.7 °C)] 97.7 °F (36.5 °C)  Heart Rate:  [64-79] 67  Resp:  [13-22] 14  BP: (136-156)/(63-97) 153/79  Output by Drain (mL) 09/14/22 0701 - 09/14/22 1900 09/14/22 1901 - 09/15/22 0700 09/15/22 0701 - 09/15/22 0807 Range Total   Requested LDAs do not have output data documented.       Physical Exam  Constitutional:       Appearance: He is normal weight.   Pulmonary:      Effort: Pulmonary effort is normal.   Neurological:      Mental Status: He is alert.      Comments: He has some right arm weakness which appears to have been present chronically.  Both he and his wife report this is unchanged.  His speech is fluent.  He is oriented.   Psychiatric:         Mood and Affect: Mood normal.          Result Review    Result Review:  I have personally reviewed the results from the time of this admission to 9/15/2022 08:07 EDT and agree with these findings:  []  Laboratory  []  Microbiology  [x]  Radiology  []  EKG/Telemetry   []  Cardiology/Vascular   []  Pathology  []  Old records  []  Other:  Most notable findings include: I reviewed his head CT scan from the ER.  This demonstrates a small amount of subarachnoid hemorrhage along the medial right frontal lobe.  He has evidence of a previous craniotomy.    Assessment & Plan   Assessment / Plan     Brief Patient Summary:  Wood Desai is a 81 y.o. male with a fall and small amount of traumatic subarachnoid hemorrhage.    Active Hospital Problems:  Active Hospital Problems    Diagnosis    • SAH (subarachnoid hemorrhage) (HCC)      Plan:   I have ordered a repeat CT scan of the head.  If this is okay then he can be transferred from the ICU  and then discharged to home once able to ambulate at his baseline.  If the scan is better or the same repeat imaging would be unnecessary in the future, unless he were to develop new symptoms.    DVT prophylaxis:  Mechanical DVT prophylaxis orders are present.

## 2022-09-15 NOTE — THERAPY EVALUATION
Patient Name: Wood Desai  : 1940    MRN: [id number]                              Today's Date: 9/15/2022       Admit Date: 2022    Visit Dx:     ICD-10-CM ICD-9-CM   1. Contusion of scalp, initial encounter  S00.03XA 920   2. Fall, initial encounter  W19.XXXA E888.9   3. Pleural effusion  J90 511.9   4. SAH (subarachnoid hemorrhage) (HCC)  I60.9 430   5. Oropharyngeal dysphagia  R13.12 787.22   6. Difficulty walking  R26.2 719.7   7. Decreased activities of daily living (ADL)  Z78.9 V49.89     Patient Active Problem List   Diagnosis   • Acute cystitis   • BPH (benign prostatic hyperplasia)   • Gout   • High blood pressure   • Coronary artery disease involving native heart without angina pectoris   • Pneumonia of left lung due to infectious organism   • Elevated troponin   • Benign prostatic hyperplasia with urinary frequency   • Malignant neoplasm of urinary bladder (HCC)   • SAH (subarachnoid hemorrhage) (HCC)     Past Medical History:   Diagnosis Date   • Acute cystitis 2018   • BPH (benign prostatic hyperplasia)    • Brain bleed (HCC)    • Broken ankle    • Broken neck (HCC)    • Coronary artery disease involving native heart without angina pectoris 2022   • Gout    • High blood pressure    • Myocardial infarction (HCC)      Past Surgical History:   Procedure Laterality Date   • COLON SURGERY     • CRANIOTOMY     • CYSTOSCOPY     • EXTERNAL EAR SURGERY     • HERNIA REPAIR     • NECK SURGERY     • NOSE SURGERY     • SHOULDER SURGERY     • SKIN CANCER EXCISION     • SPLENECTOMY        General Information     Row Name 09/15/22 1545          OT Time and Intention    Document Type evaluation  -ES     Mode of Treatment individual therapy;occupational therapy  -ES     Row Name 09/15/22 1545          General Information    Patient Profile Reviewed yes  -ES     Prior Level of Function min assist:;ADL's  Assist with ADLs.Patient is able to dress upper body, mod to max assist with lower  body. Feeding tube. Tub shower combo with shower chair, seated shower completion.  Patient sleeps on left chair.  Brief at night for patient incontinence.  No home O2.  -ES     Existing Precautions/Restrictions fall  -ES     Barriers to Rehab none identified  -ES     Row Name 09/15/22 1545          Occupational Profile    Reason for Services/Referral (Occupational Profile) Patient is 81 yr old male admitted to [facility] on 9/14/2022 after mechanical fall at home with loss of consciousness.  Patient admitted with subarachnoid hemorrhage. OT evaluation and treatment ordered d/t recent decline in ADLs/transfer ability and discharge planning recommendations. No previous OT services for current condition.  -ES     Row Name 09/15/22 1545          Living Environment    People in Home spouse  -ES     Row Name 09/15/22 1545          Home Main Entrance    Number of Stairs, Main Entrance other (see comments)  Ramp  -ES     Row Name 09/15/22 1545          Stairs Within Home, Primary    Number of Stairs, Within Home, Primary none  -ES     Row Name 09/15/22 1545          Cognition    Orientation Status (Cognition) oriented x 4  Patient pleasant and cooperative agreeable to therapy evaluation.  Spouse present during evaluation.  -ES     Row Name 09/15/22 1545          Safety Issues, Functional Mobility    Impairments Affecting Function (Mobility) balance;endurance/activity tolerance;pain;strength  -ES           User Key  (r) = Recorded By, (t) = Taken By, (c) = Cosigned By    Initials Name Provider Type    ES Rajwinder Lim, OT Occupational Therapist                 Mobility/ADL's     Row Name 09/15/22 1547          Bed Mobility    Bed Mobility supine-sit;sit-supine  -ES     Supine-Sit [place] (Bed Mobility) minimum assist (75% patient effort);1 person assist  -ES     Sit-Supine [place] (Bed Mobility) contact guard;1 person assist  -ES     Assistive Device (Bed Mobility) draw sheet  -ES     Row Name 09/15/22  1547          Transfers    Transfers sit-stand transfer;stand-sit transfer  -ES     Sit-Stand [place] (Transfers) contact guard;verbal cues;1 person assist  -ES     Stand-Sit [place] (Transfers) contact guard;verbal cues;1 person assist  -ES     Row Name 09/15/22 1547          Sit-Stand Transfer    Assistive Device (Sit-Stand Transfers) other (see comments)  HHA  -ES     Row Name 09/15/22 1547          Stand-Sit Transfer    Assistive Device (Stand-Sit Transfers) other (see comments)  HHA  -ES     Row Name 09/15/22 1547          Activities of Daily Living    BADL Assessment/Intervention bathing;upper body dressing;lower body dressing;grooming;feeding;toileting  -ES     [facility] Name 09/15/22 1547          Bathing Assessment/Intervention    [place] Level (Bathing) bathing skills;moderate assist (50% patient effort)  -ES     [facility] Name 09/15/22 1547          Upper Body Dressing Assessment/Training    [place] Level (Upper Body Dressing) upper body dressing skills;set up  -ES     [facility] Name 09/15/22 1547          Lower Body Dressing Assessment/Training    [place] Level (Lower Body Dressing) lower body dressing skills;maximum assist (25% patient effort)  -ES     [facility] Name 09/15/22 1547          Grooming Assessment/Training    [place] Level (Grooming) grooming skills;set up  -ES     [facility] Name 09/15/22 1547          Self-Feeding Assessment/Training    [place] Level (Feeding) feeding skills;set up  -ES     [facility] Name 09/15/22 1547          Toileting Assessment/Training    [place] Level (Toileting) toileting skills;moderate assist (50% patient effort)  -ES           User Key  (r) = Recorded By, (t) = Taken By, (c) = Cosigned By    Initials Name Provider Type    ES Rajwinder Lim OT Occupational Therapist               Obj/Interventions     Row Name 09/15/22 1548          Sensory Assessment (Somatosensory)    Sensory Assessment (Somatosensory) bilateral UE  -ES     Bilateral UE Sensory Assessment  general sensation;intact  -ES     Row Name 09/15/22 1548          Vision Assessment/Intervention    Visual Impairment/Limitations WFL  -ES     Row Name 09/15/22 1548          Range of Motion Comprehensive    General Range of Motion no range of motion deficits identified;bilateral upper extremity ROM WNL  -ES     [facility] Name 09/15/22 1548          Strength Comprehensive (MMT)    General Manual Muscle Testing (MMT) Assessment upper extremity strength deficits identified  -ES     Comment, General Manual Muscle Testing (MMT) Assessment bilateral upper extremities 4-/5  -ES     [facility] Name 09/15/22 1548          Motor Skills    Motor Skills functional endurance;coordination  -ES     Coordination WFL;upper extremity  -ES     Functional Endurance fair minus  -ES     [facility] Name 09/15/22 1548          Balance    Balance Assessment sitting dynamic balance;standing dynamic balance  -ES     Dynamic Sitting Balance standby assist  -ES     Position, Sitting Balance unsupported;sitting edge of bed  -ES     Dynamic Standing Balance contact guard;1-person assist  -ES     Position/Device Used, Standing Balance supported;other (see comments)  HHA x1  -ES           User Key  (r) = Recorded By, (t) = Taken By, (c) = Cosigned By    Initials Name Provider Type    ES Rajwinder Lim, OT Occupational Therapist               Goals/Plan     Row Name 09/15/22 1550          Transfer Goal 1 (OT)    Activity/Assistive Device (Transfer Goal 1, OT) transfers, all;walker, rolling  -ES     [place] Level/Cues Needed (Transfer Goal 1, OT) modified independence  -ES     Time Frame (Transfer Goal 1, OT) long term goal (LTG);10 days  -ES     [facility] Name 09/15/22 1550          Bathing Goal 1 (OT)    Activity/Device (Bathing Goal 1, OT) bathing skills, all  -ES     [place] Level/Cues Needed (Bathing Goal 1, OT) supervision required  -ES     Time Frame (Bathing Goal 1, OT) long term goal (LTG);10 days  -ES     [facility] Name 09/15/22 1550          Dressing Goal 1  (OT)    Activity/Device (Dressing Goal 1, OT) dressing skills, all  -ES     [place]/Cues Needed (Dressing Goal 1, OT) supervision required  -ES     Time Frame (Dressing Goal 1, OT) long term goal (LTG);10 days  -ES     Row Name 09/15/22 1550          Toileting Goal 1 (OT)    Activity/Device (Toileting Goal 1, OT) toileting skills, all  -ES     [place] Level/Cues Needed (Toileting Goal 1, OT) supervision required  -ES     Time Frame (Toileting Goal 1, OT) long term goal (LTG);10 days  -ES     Row Name 09/15/22 1550          Grooming Goal 1 (OT)    Activity/Device (Grooming Goal 1, OT) grooming skills, all  -ES     [place] (Grooming Goal 1, OT) supervision required  -ES     Time Frame (Grooming Goal 1, OT) long term goal (LTG);10 days  -ES     Row Name 09/15/22 1550          Strength Goal 1 (OT)    Strength Goal 1 (OT) Pt will increase BUE strength 1/2 muscle grade w/ HEP provided handouts and demo for increased UE strength required for ADL transfers and task completion  -ES     Time Frame (Strength Goal 1, OT) long term goal (LTG);10 days  -ES     Row Name 09/15/22 1550          Problem Specific Goal 1 (OT)    Problem Specific Goal 1 (OT) Patient will demonstrate fair plus activity tolerance with dynamic ADL engagement in preparation for independent ADL routine completion of discharge  -ES     Time Frame (Problem Specific Goal 1, OT) long term goal (LTG);10 days  -ES     Row Name 09/15/22 1550          Therapy Assessment/Plan (OT)    Planned Therapy Interventions (OT) activity tolerance training;BADL retraining;functional balance retraining;occupation/activity based interventions;patient/caregiver education/training;strengthening exercise;transfer/mobility retraining  -ES           User Key  (r) = Recorded By, (t) = Taken By, (c) = Cosigned By    Initials Name Provider Type    Rajwinder Kimbrough, OT Occupational Therapist               Clinical Impression     Row Name 09/15/22 7092          Plan of Care  Review    Plan of Care Reviewed With patient;spouse  -ES     Progress no change  -ES     Outcome Evaluation Patient has experienced decline in function from baseline status, presenting w/ deficits related to functional balance, activity tolerance, strength, transfers and mobility that impede patient independence with activities of daily living.  Patient would benefit from skilled Occupational Therapy intervention to maxamize patient safety, and promote return to baseline independence. Recommend home with home health and continued assist from wife at time of discharge.  -ES     Row Name 09/15/22 1546          Therapy Assessment/Plan (OT)    Rehab Potential (OT) good, to achieve stated therapy goals  -ES     Criteria for Skilled Therapeutic Interventions Met (OT) yes;meets criteria;skilled treatment is necessary  -ES     Therapy Frequency (OT) 5 times/wk  -ES     Row Name 09/15/22 6432          Therapy Plan Review/Discharge Plan (OT)    Anticipated Discharge Disposition (OT) home with assist  -ES           User Key  (r) = Recorded By, (t) = Taken By, (c) = Cosigned By    Initials Name Provider Type    ES Rajwinder Lim, GRA[place] Occupational Therapist               Outcome Measures     Row Name 09/15/22 1551          How much help from another is currently needed...    Putting on and taking off regular lower body clothing? 2  -ES     Bathing (including washing, rinsing, and drying) 2  -ES     Toileting (which includes using toilet bed pan or urinal) 2  -ES     Putting on and taking off regular upper body clothing 3  -ES     Taking care of personal grooming (such as brushing teeth) 3  -ES     Eating meals 4  -ES     AM-PAC 6 Clicks Score (OT) 16  -ES     Row Name 09/15/22 1500          How much help from another person do you currently need...    Turning from your back to your side while in flat bed without using bedrails? 3  -AV     Moving from lying on back to sitting on the side of a flat bed without bedrails? 3  -AV      Moving to and from a bed to a chair (including a wheelchair)? 3  -AV     Standing up from a chair using your arms (e.g., wheelchair, bedside chair)? 3  -AV     Climbing 3-5 steps with a railing? 2  -AV     To walk in hospital room? 3  -AV     AM-PAC 6 Clicks Score (PT) 17  -AV     Highest level of mobility 5 --> Static standing  -AV     Row Name 09/15/22 1551 09/15/22 1500       Functional Assessment    Outcome Measure Options AM-PAC 6 Clicks Daily Activity (OT);Optimal Instrument  -ES AM-PAC 6 Clicks Basic Mobility (PT)  -AV    Row Name 09/15/22 1551          Optimal Instrument    Optimal Instrument Optimal - 3  -ES     Bending/Stooping 2  -ES     Standing 2  -ES     Reaching 1  -ES     From the list, choose the 3 activities you would most like to be able to do without any difficulty Bending/stooping;Standing;Reaching  -ES     Total Score Optimal - 3 5  -ES           User Key  (r) = Recorded By, (t) = Taken By, (c) = Cosigned By    Initials Name Provider Type    Ihsan Alvarez, PT Physical Therapist    ES Rajwinder Lim, OT Occupational Therapist                Occupational Therapy Education                 Title: PT OT SLP Therapies (In Progress)     Topic: Occupational Therapy (Done)     Point: ADL training (Done)     Description:   Instruct learner(s) on proper safety adaptation and remediation techniques during self care or transfers.   Instruct in proper use of assistive devices.              Learning Progress Summary           Patient Acceptance, E,TB, VU by ES at 9/15/2022 1551   Significant Other Acceptance, E,TB, VU by ES at 9/15/2022 1551                   Point: Home exercise program (Done)     Description:   Instruct learner(s) on appropriate technique for monitoring, assisting and/or progressing therapeutic exercises/activities.              Learning Progress Summary           Patient Acceptance, E,TB, VU by ES at 9/15/2022 1551   Significant Other Acceptance, E,TB, VU by  at 9/15/2022 1551                    Point: Precautions (Done)     Description:   Instruct learner(s) on prescribed precautions during self-care and functional transfers.              Learning Progress Summary           Patient Acceptance, E,TB, VU by ES at 9/15/2022 1551   Significant Other Acceptance, E,TB, VU by ES at 9/15/2022 1551                   Point: Body mechanics (Done)     Description:   Instruct learner(s) on proper positioning and spine alignment during self-care, functional mobility activities and/or exercises.              Learning Progress Summary           Patient Acceptance, E,TB, VU by ES at 9/15/2022 1551   Significant Other Acceptance, E,TB, VU by ES at 9/15/2022 1551                               User Key     Initials Effective Dates Name Provider Type Discipline     09/13/21 -  Rajwinder Lim OT Occupational Therapist OT              OT Recommendation and Plan  Planned Therapy Interventions (OT): activity tolerance training, BADL retraining, functional balance retraining, occupation/activity based interventions, patient/caregiver education/training, strengthening exercise, transfer/mobility retraining  Therapy Frequency (OT): 5 times/wk  Plan of Care Review  Plan of Care Reviewed With: patient, spouse  Progress: no change  Outcome Evaluation: Patient has experienced decline in function from baseline status, presenting w/ deficits related to functional balance, activity tolerance, strength, transfers and mobility that impede patient independence with activities of daily living.  Patient would benefit from skilled Occupational Therapy intervention to maxamize patient safety, and promote return to baseline independence. Recommend home with home health and continued assist from wife at time of discharge.     Time Calculation:    Time Calculation- OT     Row Name 09/15/22 1552             Time Calculation- OT    OT Received On 09/15/22  -      OT Goal Re-Cert Due Date 09/24/22  -ES              Untimed Charges     OT Eval/Re-eval Minutes 33  -ES              Total Minutes    Untimed Charges Total Minutes 33  -ES       Total Minutes 33  -ES            User Key  (r) = Recorded By, (t) = Taken By, (c) = Cosigned By    Initials Name Provider Type    Rajwinder Kimbrough OT Occupational Therapist              Therapy Charges for Today     Code Description Service Date Service Provider Modifiers Qty    [id number] HC OT EVAL LOW COMPLEXITY 3 9/15/2022 Rajwinder Lim OT GO 1               Rajwinder Lim OT  9/15/2022

## 2022-09-15 NOTE — SIGNIFICANT NOTE
Wound Eval / Progress Noted    JOCELYN Butterfield     Patient Name: Wood Desai  : 1940  MRN: [id number]  Today's Date: 9/15/2022                 Admit Date: 2022    Visit Dx:    ICD-10-CM ICD-9-CM   1. Contusion of scalp, initial encounter  S00.03XA 920   2. Fall, initial encounter  W19.XXXA E888.9   3. Pleural effusion  J90 511.9   4. SAH (subarachnoid hemorrhage) (HCC)  I60.9 430   5. Oropharyngeal dysphagia  R13.12 787.22       Patient Active Problem List   Diagnosis    Acute cystitis    BPH (benign prostatic hyperplasia)    Gout    High blood pressure    Coronary artery disease involving native heart without angina pectoris    Pneumonia of left lung due to infectious organism    Elevated troponin    Benign prostatic hyperplasia with urinary frequency    Malignant neoplasm of urinary bladder (HCC)    SAH (subarachnoid hemorrhage) (HCC)        Past Medical History:   Diagnosis Date    Acute cystitis 2018    BPH (benign prostatic hyperplasia)     Brain bleed (HCC)     Broken ankle     Broken neck (HCC)     Coronary artery disease involving native heart without angina pectoris 2022    Gout     High blood pressure     Myocardial infarction (HCC)         Past Surgical History:   Procedure Laterality Date    COLON SURGERY      CRANIOTOMY      CYSTOSCOPY      EXTERNAL EAR SURGERY      HERNIA REPAIR      NECK SURGERY      NOSE SURGERY      SHOULDER SURGERY      SKIN CANCER EXCISION      SPLENECTOMY           Physical Assessment:  Wound 09/15/22 0400 medial coccyx Pressure Injury (Active)   Wound Image   09/15/22 1210   Pressure Injury Stage 2 09/15/22 0400   Dressing Appearance open to air 09/15/22 1210   Closure None 09/15/22 1210   Base pink;red;blanchable;dry 09/15/22 1210   Periwound blanchable;redness 09/15/22 1210   Periwound Temperature warm 09/15/22 1210   Periwound Skin Turgor soft 09/15/22 1210   Edges rolled/closed 09/15/22 1210   Drainage Amount none 09/15/22 1210   Care, Wound  cleansed with;sterile normal saline 09/15/22 1210   Dressing Care dressing applied;non-adherent;petroleum-based;silicone;border dressing 09/15/22 1210   Periwound Care absorptive dressing applied 09/15/22 1210        Wound Check / Follow-up: Patient seen today for wound consult. Patient with history of pressure injury to right gluteal aspect that has been getting treated at home for a couple months per wife. Patient has utilized dressings as well as topical treatments. Patient currently with blanchable reddened tissue that is dry and rolling to bilateral gluteal aspects. No non-blanchable areas identified this assessment. Due to dryness and rolling of tissue, recommending use of non-adherent petroleum based gauze and silicone border dressing or silicone barrier. Recommending not to use Calazime. Patient also with excessively dry flaking skin to BLE, LLE around ankle worse. Edema noted. Recommending skin hygiene and skin care with Aquaphor. Patient is high risk for skin breakdown. Recommending skin protection and pressure reduction measures.   Patient also with feeding tube site. Will recommend q shift site care to maintain integrity. No drainage currently visible. Dried crusting around site present.     Impression: Prior pressure injury to right gluteal aspect. Dry skin, Feeding tube insertion site    Short term goals:  Regain skin integrity. Site care q shift. Daily dressing. Skin care / hygiene. Skin protection, pressure reduction, moisture prevention    Katherine Sneed RN    9/15/2022    13:20 EDT

## 2022-09-15 NOTE — CONSULTS
Pulmonary / Critical Care Consult Note      Patient Name: Wood Desai  : 1940  MRN: [id number]  Primary Care Physician:  Lea Fallon MD  Referring Physician: Job Peralta DO  Date of admission: 2022    Subjective   Subjective     Reason for Consult/ Chief Complaint:   Subarachnoid hemorrhage    HPI:  Wood Desai is a 81 y.o. male came in to the ER if after having a mechanical fall at home.  Denies any syncopal events or chest pain.  Denies any dizziness.  States he just tripped and fell and hit his head against a cabinet.  CT was done in the emergency department showing a small acute intracranial hemorrhage/subarachnoid hemorrhage.  He did have an x-ray done showing some left lung atelectasis.  CT shows small left pleural effusion with left lower lobe atelectasis.  In the emergency department he also had sudden onset acute abdominal pain.  LFTs and lipase is negative.  CT of the abdomen pelvis pending.  It is sharp, intermittent, in the mid abdomen, 6 out of 10 in severity, nonradiating, no alleviating or aggravating factors.  Denies any constipation, melena or hematochezia.    Review of Systems  Constitutional symptoms: Fatigue, otherwise Denied complaints   Ear, nose, throat: Denied complaints  Cardiovascular:  Denied complaints  Respiratory: Denied complaints  Gastrointestinal: Abdominal pain, otherwise denied complaints  Musculoskeletal: Weakness, falls, otherwise denied complaints  Genitourinary: Denied complaints  Neurologic: Denied complaints  Skin: Denied complaints      Personal History     Past Medical History:   Diagnosis Date   • Acute cystitis 2018   • BPH (benign prostatic hyperplasia)    • Brain bleed (HCC)    • Broken ankle    • Broken neck (HCC)    • Coronary artery disease involving native heart without angina pectoris 2022   • Gout    • High blood pressure    • Myocardial infarction (HCC)        Past Surgical History:   Procedure Laterality Date   •  COLON SURGERY     • CRANIOTOMY     • CYSTOSCOPY     • EXTERNAL EAR SURGERY     • HERNIA REPAIR     • NECK SURGERY     • NOSE SURGERY     • SHOULDER SURGERY     • SKIN CANCER EXCISION     • SPLENECTOMY         Family History: family history includes Lung cancer in his father; Ulcers in his mother. Otherwise pertinent FHx was reviewed and not pertinent to current issue.    Social History:  reports that he has never smoked. He has never used smokeless tobacco. He reports that he does not drink alcohol.    Home Medications:  Metoprolol Tartrate, QUEtiapine, aspirin, docusate sodium, finasteride, gabapentin, hydrocortisone, jevity 1.5 edward, lactulose, lansoprazole 3 mg/mL in sodium bicarbonate injection 8.4%, levETIRAcetam, levothyroxine, melatonin, mupirocin, nystatin, potassium phosphate (monobasic), and primidone    Allergies:  No Known Allergies    Objective    Objective     Vitals:   Temp:  [97.7 °F (36.5 °C)-98 °F (36.7 °C)] 97.7 °F (36.5 °C)  Heart Rate:  [61-79] 75  Resp:  [13-22] 14  BP: (134-157)/(63-97) 134/76    Physical Exam:  Vital Signs Reviewed   General:  WDWN, Alert, NAD.    HEENT:  PERRL, EOMI.  OP, nares clear  Neck:  Supple, no JVD, no thyromegaly  Chest:  good aeration, diminished left chest with left base rhonchi, tympanic to percussion bilaterally, no work of breathing noted  CV: RRR, no MGR, pulses 2+, equal.  Abd:  Soft, tenderness over mid abdomen with no guarding or rebound, nondistended, + BS, no HSM, PEG tube site is C/C/I  EXT:  no clubbing, no cyanosis, no edema  Neuro:  A&Ox3, CN grossly intact, no focal deficits.  Skin: No rashes or lesions noted      Result Review    Result Review:  I have personally reviewed the results from the time of this admission to 9/15/2022 12:05 EDT and agree with these findings:  [x]  Laboratory  [x]  Microbiology  [x]  Radiology  [x]  EKG/Telemetry   [x]  Cardiology/Vascular   []  Pathology  []  Old records  []  Other:  Most notable findings include:  Head CT  with small subarachnoid hemorrhage  Chest CT with left lower lobe atelectasis with small left pleural effusion.  Previous imaging dating back to last year shows atelectasis on the left base is persistent however the effusion appears new.      Lab 09/15/22  0841 09/15/22  0620 09/14/22 2028   WBC 13.34*  --  13.16*   HEMOGLOBIN 13.1  --  13.8   HEMATOCRIT 39.2  --  41.0   PLATELETS 344  --  356   SODIUM  --  134* 138   POTASSIUM  --  4.2 4.4   CHLORIDE  --  97* 98   CO2  --  26.5 32.1*   BUN  --  16 18   CREATININE  --  0.68* 0.75*   GLUCOSE  --  123* 122*   CALCIUM  --  9.3 10.0   TOTAL PROTEIN  --  6.9 7.4   ALBUMIN  --  3.90 4.20   GLOBULIN  --  3.0 3.2         Assessment & Plan   Assessment / Plan     Active Hospital Problems:  Active Hospital Problems    Diagnosis    • SAH (subarachnoid hemorrhage) ([facility])      Impression:  Traumatic subarachnoid hemorrhage  Mechanical fall  Acute abdominal pain of unclear etiology  Left lower lobe atelectasis  Small left pleural effusion  Hypothyroidism    Plan:  Appreciate neurosurgical recommendations.  We will repeat a head CT this morning for 6-hour follow-up.  If stable, no further interventions will be needed  Hold home aspirin  Blood pressure adequate.  Avoid hypo and hypertension  LFTs and lipase grossly unremarkable.  Agree with CT of the abdomen and pelvis to look for cause of acute abdominal pain that is persistent.  We will give tramadol as needed for pain  CT of the abdomen pelvis will also further assess left pleural effusion.  I did discuss with the patient and his wife that this effusion appears new however the left lower lobe atelectasis appears chronic.  If it is still present, we may need to consider ultrasound-guided diagnostic and therapeutic left-sided thoracentesis.  Patient and his wife were agreeable with this plan.  I have discussed the risks of the procedure with the patient including pneumothorax, hemothorax, bleeding, hypoxia and death. The patient  recognizes these findings, acknowledges these findings and is agreeable to the procedure.    Continue home Synthroid, Seroquel, Proscar    DVT prophylaxis:  Mechanical DVT prophylaxis orders are present.     Code Status and Medical Interventions:   Ordered at: 09/15/22 0728     Code Status (Patient has no pulse and is not breathing):    CPR (Attempt to Resuscitate)     Medical Interventions (Patient has pulse or is breathing):    Full Support     Release to patient:    Routine Release        The patient is critically ill in the ICU with subarachnoid hemorrhage, left pleural effusion, acute abdominal pain, mechanical fall. Multidisciplinary bedside critical care rounds were performed with nursing staff, respiratory therapy, pharmacy, nutritional services, social work. I have personally reviewed the chart, labs and any pertinent imaging available.  I have spent 30 minutes of critical care time, excluding procedures, in the care of this patient.    Electronically signed by Simone Muñoz MD, 09/15/22, 12:08 PM EDT.

## 2022-09-15 NOTE — CONSULTS
"Nutrition Services    Patient Name: Wood Desai  YOB: 1940  MRN: [id number]  Admission date: 9/14/2022      CLINICAL NUTRITION ASSESSMENT      Reason for Assessment  Physician consult, EN     H&P:    Past Medical History:   Diagnosis Date   • Acute cystitis 04/17/2018   • BPH (benign prostatic hyperplasia)    • Brain bleed (HCC)    • Broken ankle    • Broken neck (HCC)    • Coronary artery disease involving native heart without angina pectoris 01/03/2022   • Gout    • High blood pressure    • Myocardial infarction (HCC)         Current Problems:   Active Hospital Problems    Diagnosis    • SAH (subarachnoid hemorrhage) (HCC)         Nutrition/Diet History         Narrative     Patient admitted after a fall.  Subarachnoid hemorrhage.  Abdominal pain.  Plan for CT scan today.  Patient evaluated by speech therapy and plan to start mechanical soft diet.  Discussed with patient and wife at bedside.  Patient has a PEG tube which they use for supplemental nutrition support at nighttime.  Will order according to patient's home regimen.  Patient uses Jevity 1.5 at home and wife is agreeable to formulary equivalent while admitted.       Anthropometrics        Current Height, Weight Height: 182.9 cm (72\")  Weight: 75.2 kg (165 lb 12.6 oz)   Current BMI Body mass index is 22.48 kg/m².       Weight Hx  Wt Readings from Last 30 Encounters:   09/15/22 0300 75.2 kg (165 lb 12.6 oz)   09/14/22 1950 77.4 kg (170 lb 10.2 oz)   02/16/22 1114 68 kg (149 lb 14.6 oz)   02/16/22 0500 68.1 kg (150 lb 2.1 oz)   02/15/22 2045 73.3 kg (161 lb 9.6 oz)   04/26/21 0000 81.6 kg (180 lb)   12/28/20 0000 83 kg (183 lb)   04/17/19 0000 85.3 kg (188 lb)   04/15/19 0000 85.3 kg (188 lb)   10/12/18 0000 77.1 kg (170 lb)   07/11/18 0000 76.2 kg (168 lb)   05/25/18 0000 76.2 kg (168 lb)   05/02/18 0000 77.1 kg (170 lb)   04/13/18 0000 77.1 kg (170 lb)   04/05/18 0000 76.4 kg (168 lb 8 oz)   01/19/18 0000 80.5 kg (177 lb 8 oz)         "    Wt Change Observation stable     Estimated/Assessed Needs       Energy Requirements    EST Needs (kcal/day) 2256       Protein Requirements    EST Daily Needs (g/day) 75-90       Fluid Requirements     Estimated Needs (mL/day) 9054-8334     Labs/Medications         Pertinent Labs Reviewed.   Results from last 7 days   Lab Units 09/15/22  0620 09/14/22 2028   SODIUM mmol/L 134* 138   POTASSIUM mmol/L 4.2 4.4   CHLORIDE mmol/L 97* 98   CO2 mmol/L 26.5 32.1*   BUN mg/dL 16 18   CREATININE mg/dL 0.68* 0.75*   CALCIUM mg/dL 9.3 10.0   BILIRUBIN mg/dL 0.5 0.2   ALK PHOS U/L 163* 168*   ALT (SGPT) U/L 22 24   AST (SGOT) U/L 17 19   GLUCOSE mg/dL 123* 122*     Results from last 7 days   Lab Units 09/15/22  0841 09/15/22  0620 09/14/22  2028   MAGNESIUM mg/dL  --  2.1 2.2   HEMOGLOBIN g/dL 13.1  --  13.8   HEMATOCRIT % 39.2  --  41.0   TRIGLYCERIDES mg/dL  --  70  --      No results found for: COVID19  Lab Results   Component Value Date    HGBA1C 6.50 (H) 09/15/2022         Pertinent Medications Reviewed.     Current Nutrition Orders & Evaluation of Intake       Oral Nutrition     Current PO Diet NPO Diet NPO Type: Ice Chips, Sips with Meds   Supplement No active supplement orders       Malnutrition Severity Assessment                Nutrition Diagnosis         Nutrition Dx Problem 1 Inadequate energy Intake related to decreased ability to consume sufficient energy as evidenced by Supplemental nutrition support       Nutrition Intervention         Isosource 1.5@75 mL/hour x12 hours  Free water flushes 40 mL an hour x12 hours  Provides 1350 kcal, 61 g of protein, 1118 mL fluid    Provides about 60% of estimated kcal needs, 70% of estimated protein needs, 55% estimated fluid needs.     Medical Nutrition Therapy/Nutrition Education          Learner     Readiness Patient and Family  Acceptance     Method     Response Explanation  Verbalizes understanding     Monitor/Evaluation        Monitor Per protocol, I&O, PO intake,  Pertinent labs, EN delivery/tolerance, Weight, GI status       Nutrition Discharge Plan         To be determined       Electronically signed by:  Zuleika Saeed RD  09/15/22 14:24 EDT

## 2022-09-15 NOTE — ED PROVIDER NOTES
Time: 8:21 PM EDT  Arrived by: ambulance  Chief Complaint: Fall  History provided by: patient, spouse  History is limited by: N/A     History of Present Illness:  Patient is a 81 y.o. year old male who presents to the emergency department after a fall today. Pt was walking in his kitchen when he started falling backwards and hit his head on the cabinet. He now complains of a headache and back pain. Pt is unsure of LOC. Pt is not on any blood thinners. Pt has taken Tylenol for pain relief.       History provided by:  Patient and spouse   used: No        Similar Symptoms Previously: no  Recently seen: no      Patient Care Team  Primary Care Provider: Lea Fallon MD    Past Medical History:     No Known Allergies  Past Medical History:   Diagnosis Date   • Acute cystitis 04/17/2018   • BPH (benign prostatic hyperplasia)    • Brain bleed (HCC)    • Broken ankle    • Broken neck (HCC)    • Coronary artery disease involving native heart without angina pectoris 01/03/2022   • Gout    • High blood pressure    • Myocardial infarction (HCC)      Past Surgical History:   Procedure Laterality Date   • COLON SURGERY     • CRANIOTOMY     • CYSTOSCOPY     • EXTERNAL EAR SURGERY     • HERNIA REPAIR     • NECK SURGERY     • NOSE SURGERY     • SHOULDER SURGERY     • SKIN CANCER EXCISION     • SPLENECTOMY       Family History   Problem Relation Age of Onset   • Ulcers Mother    • Lung cancer Father        Home Medications:  Prior to Admission medications    Medication Sig Start Date End Date Taking? Authorizing Provider   aspirin 81 MG chewable tablet Administer 1 tablet per J tube Daily. 2/24/22   Hema Saldana MD   finasteride (PROSCAR) 5 MG tablet Administer 1 tablet per G tube Daily. 5/23/22   Arvind Umana MD   gabapentin (NEURONTIN) 600 MG tablet Administer 0.5 tablets per G tube 3 (Three) Times a Day. 2/24/22   Hema Saldana MD   lactulose (CHRONULAC) 10 GM/15ML solution  Administer 30 mL per G tube As Needed (constipation). 2/24/22   Hema Saldana MD   lansoprazole 3 mg/mL in sodium bicarbonate injection 8.4% Administer 5 mL per G tube Daily. 2/24/22   Hema Saldana MD   levETIRAcetam (KEPPRA) 1000 MG tablet Administer 1 tablet per G tube 2 (Two) Times a Day. 2/24/22   Hema Saldana MD   levothyroxine (SYNTHROID, LEVOTHROID) 50 MCG tablet Administer 1 tablet per G tube Daily. 2/24/22   Hema Saldana MD   melatonin 5 MG tablet tablet Administer 1 tablet per G tube Every Night. 2/24/22   Hema Saldana MD   metoprolol tartrate 75 MG tablet Administer 75 mg per G tube Every 12 (Twelve) Hours. 2/24/22   Hema Saldana MD   Nutritional Supplements (jevity 1.5 edward) liquid Continuous infusion 2/24/22   Hema Saldana MD   potassium phosphate, monobasic, (K-PHOS) 500 MG tablet Administer 1 tablet per G tube 3 (Three) Times a Day. 2/24/22   Hema Saldana MD   primidone (MYSOLINE) 250 MG tablet Administer 3 tablets per G tube Every Night. 2/24/22   Hema Saldana MD   QUEtiapine (SEROquel) 50 MG tablet Administer 1 tablet per G tube Every Night. 2/24/22   Hema Saldana MD        Social History:   Social History     Tobacco Use   • Smoking status: Never Smoker   • Smokeless tobacco: Never Used   Substance Use Topics   • Alcohol use: Never     Recent travel: no     Review of Systems:  Review of Systems   Constitutional: Negative for chills and fever.   HENT: Negative for congestion, ear pain and sore throat.    Eyes: Negative for pain.   Respiratory: Negative for cough, chest tightness and shortness of breath.    Cardiovascular: Negative for chest pain.   Gastrointestinal: Negative for abdominal pain, diarrhea, nausea and vomiting.   Genitourinary: Negative for flank pain and hematuria.   Musculoskeletal: Positive for back pain. Negative for joint swelling.   Skin: Negative for pallor.  "  Neurological: Positive for headaches. Negative for seizures.   All other systems reviewed and are negative.       Physical Exam:  /96 (BP Location: Right arm, Patient Position: Lying)   Pulse 76   Temp 97.8 °F (36.6 °C) (Oral)   Resp 16   Ht 182.9 cm (72\")   Wt 77.4 kg (170 lb 10.2 oz)   SpO2 96%   BMI 23.14 kg/m²     Physical Exam  Vitals and nursing note reviewed.   Constitutional:       General: He is not in acute distress.     Appearance: Normal appearance. He is not toxic-appearing.   HENT:      Head: Normocephalic. Abrasion and contusion (to left parietal occipital scalp) present.      Jaw: There is normal jaw occlusion.   Eyes:      General: Lids are normal.      Extraocular Movements: Extraocular movements intact.      Conjunctiva/sclera: Conjunctivae normal.      Pupils: Pupils are equal, round, and reactive to light.   Cardiovascular:      Rate and Rhythm: Normal rate and regular rhythm.      Pulses: Normal pulses.      Heart sounds: Normal heart sounds.   Pulmonary:      Effort: Pulmonary effort is normal. No respiratory distress.      Breath sounds: Normal breath sounds. No wheezing or rhonchi.   Abdominal:      General: Abdomen is flat.      Palpations: Abdomen is soft.      Tenderness: There is no abdominal tenderness. There is no guarding or rebound.   Musculoskeletal:         General: Normal range of motion.      Cervical back: Normal range of motion and neck supple.      Right lower leg: No edema.      Left lower leg: No edema.      Comments: Contusion to left parietal occipital scalp with abrasion   Skin:     General: Skin is warm and dry.   Neurological:      Mental Status: He is alert and oriented to person, place, and time. Mental status is at baseline.   Psychiatric:         Mood and Affect: Mood normal.                Medications in the Emergency Department:  Medications   sodium chloride 0.9 % flush 10 mL (has no administration in time range)   morphine injection 2 mg (2 mg " Intravenous Incomplete 9/15/22 0117)   acetaminophen (TYLENOL) tablet 1,000 mg (1,000 mg Per G Tube Given 9/14/22 2054)   iopamidol (ISOVUE-370) 76 % injection 100 mL (100 mL Intravenous Given 9/14/22 2213)        Labs  Lab Results (last 24 hours)     Procedure Component Value Units Date/Time    CBC & Differential [[id number]]  (Abnormal) Collected: 09/14/22 2028    Specimen: Blood Updated: 09/14/22 2113    Narrative:      The following orders were created for panel order CBC & Differential.  Procedure                               Abnormality         Status                     ---------                               -----------         ------                     CBC Auto Differential[[id number]]        Abnormal            Final result               Scan Slide[[id number]]                                       Final result                 Please view results for these tests on the individual orders.    Comprehensive Metabolic Panel [[id number]]  (Abnormal) Collected: 09/14/22 2028    Specimen: Blood Updated: 09/14/22 2109     Glucose 122 mg/dL      BUN 18 mg/dL      Creatinine 0.75 mg/dL      Sodium 138 mmol/L      Potassium 4.4 mmol/L      Chloride 98 mmol/L      CO2 32.1 mmol/L      Calcium 10.0 mg/dL      Total Protein 7.4 g/dL      Albumin 4.20 g/dL      ALT (SGPT) 24 U/L      AST (SGOT) 19 U/L      Alkaline Phosphatase 168 U/L      Total Bilirubin 0.2 mg/dL      Globulin 3.2 gm/dL      A/G Ratio 1.3 g/dL      BUN/Creatinine Ratio 24.0     Anion Gap 7.9 mmol/L      eGFR 91.8 mL/min/1.73      Comment: National Kidney Foundation and American Society of Nephrology (ASN) Task Force recommended calculation based on the Chronic Kidney Disease Epidemiology Collaboration (CKD-EPI) equation refit without adjustment for race.       Narrative:      GFR Normal >60  Chronic Kidney Disease <60  Kidney Failure <15      Troponin [[id number]]  (Normal) Collected: 09/14/22 2028    Specimen: Blood Updated: 09/14/22 2109     Troponin T  0.024 ng/mL     Narrative:      Troponin T Reference Range:  <= 0.03 ng/mL-   Negative for AMI  >0.03 ng/mL-     Abnormal for myocardial necrosis.  Clinicians would have to utilize clinical acumen, EKG, Troponin and serial changes to determine if it is an Acute Myocardial Infarction or myocardial injury due to an underlying chronic condition.       Results may be falsely decreased if patient taking Biotin.      Magnesium [[id number]]  (Normal) Collected: 09/14/22 2028    Specimen: Blood Updated: 09/14/22 2109     Magnesium 2.2 mg/dL     CBC Auto Differential [[id number]]  (Abnormal) Collected: 09/14/22 2028    Specimen: Blood Updated: 09/14/22 2113     WBC 13.16 10*3/mm3      RBC 4.44 10*6/mm3      Hemoglobin 13.8 g/dL      Hematocrit 41.0 %      MCV 92.3 fL      MCH 31.1 pg      MCHC 33.7 g/dL      RDW 14.5 %      RDW-SD 48.8 fl      MPV 11.0 fL      Platelets 356 10*3/mm3      Neutrophil % 66.9 %      Lymphocyte % 16.6 %      Monocyte % 12.2 %      Eosinophil % 3.0 %      Basophil % 0.2 %      Immature Grans % 1.1 %      Neutrophils, Absolute 8.80 10*3/mm3      Lymphocytes, Absolute 2.19 10*3/mm3      Monocytes, Absolute 1.61 10*3/mm3      Eosinophils, Absolute 0.39 10*3/mm3      Basophils, Absolute 0.03 10*3/mm3      Immature Grans, Absolute 0.14 10*3/mm3      nRBC 0.0 /100 WBC     Scan Slide [[id number]] Collected: 09/14/22 2028    Specimen: Blood Updated: 09/14/22 2113     RBC Morphology Normal     WBC Morphology Normal     Large Platelets Slight/1+           Imaging:  CT Head Without Contrast    Result Date: 9/14/2022  PROCEDURE: CT HEAD WO CONTRAST  COMPARISON: 2/15/2022.  INDICATIONS: fall, head injury  PROTOCOL:   Standard imaging protocol performed    RADIATION:   DLP: 1018.2mGy*cm   MA and/or KV was adjusted to minimize radiation dose.    TECHNIQUE: After obtaining the patient's consent, 136 CT images were obtained without non-ionic intravenous contrast material.  DISCUSSION:  A routine nonenhanced head CT  was performed.  Mild-to-moderate chronic small vessel ischemia/infarction is suspected. There are arterial and basal ganglia calcifications. The extra-axial spaces and the ventricular system are prominent.  There has been Interval decrease in size of the chronic subdural hematoma along the right frontal cerebral convexity.  It now measures about 7 mm in greatest transverse extent.  Previously, it measured about 1.1 cm.  There is new minimal acute subarachnoid or intraparenchymal contusion involving the right frontal lobe, especially present inferiorly, such as seen on images 21 and 22 of series 201 and adjacent images.  There may also be new minimal acute subarachnoid hemorrhage, as seen on image 28 of series 201, in the more lateral right frontal region.  These findings suggest an acute contrecoup injury.  A large acute hemorrhagic scalp contusion is seen in the left parietal region, measuring approximately 10 x 1.2 cm, as on image 46 of series 201 and adjacent images.  No acute skull fracture is seen.  The patient has undergone anterior parasagittal craniotomy.  Scattered opacities are seen in the right mastoid air cell complex, present previously.  There may be mild age-indeterminate sinus disease.  No definite air-fluid interfaces are seen in the imaged paranasal sinuses.  There are degenerative changes of partially imaged cervical spine.  There are suspected extensive postoperative changes of the cervical spine, partially imaged on this exam.        There is a small amount of acute intracranial hemorrhage, predominantly representing acute subarachnoid hemorrhage, in the right frontal lobe, likely related to an acute contrecoup injury.  A large hemorrhagic left parietal scalp contusion is seen.  No definite acute skull fracture is appreciated.  Probably no acute infarct.  No midline shift or acute intracranial herniation syndrome.  The chronic right-sided cerebral convexity subdural hematoma has decreased in  size slightly since 2/15/2022.  Please see above comments for further detail.    Pertinent findings were phoned to [facility] ED Physician, Dr. Magallon (who was covering for Dr. Flower, ordering/attending [facility] ED Physician), at approximately 2350 hours on 9/14/2022.  Please note that portions of this note were completed with a voice recognition program.  ORESTES MALCOLM JR, MD       Electronically Signed and Approved By: ORESTES MALCOLM JR, MD on 9/14/2022 at 23:54              CT Chest With Contrast Diagnostic    Result Date: 9/15/2022  PROCEDURE: CT CHEST W CONTRAST DIAGNOSTIC  COMPARISONS: 9/14/2022; 2/15/2022; 4/29/2021.  INDICATIONS: abnormal chest x-ray  TECHNIQUE: After obtaining the patient's consent, 701 CT images were obtained with non-ionic intravenous contrast material.   PROTOCOL:   Standard CT imaging protocol performed.    RADIATION:   Total DLP: 355 mGy*cm   Automated exposure control was utilized to minimize radiation dose. CONTRAST: 100 mL Isovue 370 I.V.  FINDINGS: The patient's upper extremities in the scan field of view obscure detail.  There is a new small to moderate sized left pleural effusion.  It has CT numbers ranging between 3 and 20 Hounsfield units.  It is likely not hemorrhagic.  It does not appear to be loculated.  Minimal, if any, right-sided pleural effusion is seen.  There is mild to moderate cardiomegaly.  No significant pericardial effusion is seen.  There is slight chronic asymmetric elevation of the left diaphragm.  Atelectasis, infiltrate(s), and/or fibrosis is seen in the left lung, especially the lingula and the left lower lobe, increased in degree since the prior 4/29/2021 study.  The patchy ground-glass opacities within the right lung, especially the right lower lobe and right middle lobe, have resolved.  There may be mild atelectasis in the right lung.  The central tracheobronchial tree is well aerated without filling defect.  Peribronchial thickening is seen, especially in the  left lower lobe.  No pneumothorax.  No pneumomediastinum.  Extensive postoperative changes involve the cervical spine, partially imaged on this exam.  There are degenerative changes throughout the imaged spine.  Degenerative changes involve the shoulders.  No definite acute rib fracture is seen.  Probably no acute findings are seen within the partially imaged upper abdomen.  Benign left renal cysts are suspected.  One the largest involves the posterior upper pole of the left kidney, measuring about 2.7 cm in greatest diameter.  There is mild fusiform dilatation of the ascending aorta, which has a maximum diameter of about 4.7 cm.  Previously, it measured about 4.7 cm.  No thoracic aortic dissection is seen.  Consider close interval clinical and imaging follow-up of these findings.  No aneurysmal dilatation of the descending aorta.  No definite pulmonary embolism is seen.  The contrast bolus within the pulmonary arteries is somewhat limited.  The spleen is not clearly identified.  It may be surgically absent.  A percutaneous gastrostomy tube is partially seen on the study.  There is a chronic T12 vertebral compression fracture, estimated at 30 percent or less in degree, with minimal if any posterior wall retropulsion.  This finding was seen previously on the 4/29/2021 study.  There is a superior sternal fracture, which is new since 4/29/2021.  It is thought most likely to be subacute to chronic in nature.  It is nondisplaced.  There is periosteal reaction, callus formation, and sclerosis about the center of the expected fracture line.  No other definite acute fractures are suspected.  There are small scattered mediastinal lymph nodes.  Probably no enlarged mediastinal lymph nodes are seen.  Coronary artery calcifications are present.  There is probably bilateral gynecomastia.  No enlargement of the thyroid gland.  Probably no enlarged axillary lymph nodes are seen.        1. There is a new left-sided pleural  effusion, small to moderate size.  Minimal, if any, right pleural effusion is seen.  2. Atelectasis, infiltrate(s), and/or fibrosis is seen in the left lung base, increased since the prior study.  Pneumonia cannot be excluded, including aspiration pneumonia.  3. Interval resolution of the somewhat ground-glass opacities in the right lung base since 4/29/2021.  4. There is mild to moderate cardiomegaly.  5. 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  No thoracic aortic dissection.  No descending aortic aneurysm.  No acute mediastinal hematoma.  No acute hemothorax is suspected.  6. There are postoperative changes cervical spine.  7. There is a new chronic nondisplaced superior sternal fracture.  8. There is a percutaneous gastrostomy tube in place.  It is partially imaged on the study.  9. 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  10. No pneumothorax is identified.  No pneumomediastinum. 11. Please see above comments for further detail.   1.   Please note that portions of this note were completed with a voice recognition program.  ORESTES MALCOLM JR, MD       Electronically Signed and Approved By: ORESTES MALCOLM JR, MD on 9/15/2022 at 0:11              XR Chest 1 View    Result Date: 9/14/2022  PROCEDURE: XR CHEST 1 VW  COMPARISON: [facility], , XR CHEST 1 VW, 2/15/2022, 21:10.  INDICATIONS: Weak/Dizzy/AMS triage protocol, fall  FINDINGS:  Single frontal view chest is compared previous study performed on 2/15/2022.  Today's study reveals the patient is status post surgical fusion of multiple levels in the inferior cervical spine with metallic plate and screws and bilateral pedicular screws and bilateral posterior metallic vertical rods.  There is a moderate size area of infiltrate and effusion in the left lower lobe lung appears to have gotten worse since previous study.  No acute or focal abnormality seen the right lung.        1. Moderate size infiltrate and  effusion appears to have gotten worse in the left lower lobe lung since previous study performed on 2/15/2022 CT of the chest might be helpful to further evaluate this abnormality to rule out the possibility of occult mass       SHANNA TALBOT MD       Electronically Signed and Approved By: SHANNA TALBOT MD on 9/14/2022 at 20:54               Procedures:  Procedures    Progress                            Medical Decision Making:  MDM  Number of Diagnoses or Management Options  Contusion of scalp, initial encounter  Fall, initial encounter  Pleural effusion  Diagnosis management comments: In summary this is a 81-year-old male who presents emerged department for evaluation status post fall at home with resultant head injury.  Questionable loss of consciousness.  Patient does have a scalp hematoma.  Laboratory evaluation shows nothing acute.  Chest x-ray revealed worsening left-sided effusion compared to previous x-ray in February of this year.  CT of the brain and chest were also performed.    Patient signed out to me by Dr. Flower.  Patient had a ground-level fall.  CT of his head was obtained that showed a small subarachnoid hemorrhage.  I discussed finding with neurosurgeon on-call Dr. Pichardo who reviewed imaging.  He recommended admission repeat scan in the morning. No neurosurgical intervention indicated at this time.  Patient also was found to have a pleural effusion.  Patient does not have any respiratory issues at this time.  I discussed patient with hospitalist and he will be admitted for further care.       Amount and/or Complexity of Data Reviewed  Clinical lab tests: reviewed and ordered  Tests in the radiology section of CPT®: reviewed and ordered  Tests in the medicine section of CPT®: reviewed    Risk of Complications, Morbidity, and/or Mortality  Presenting problems: moderate  Management options: moderate         Final diagnoses:   Contusion of scalp, initial encounter   Fall, initial  encounter   Pleural effusion   SAH (subarachnoid hemorrhage) ([facility])        Disposition:  ED Disposition     ED Disposition   Decision to Admit    Condition   --    Comment   Level of Care: Critical Care [6]   Diagnosis: SAH (subarachnoid hemorrhage) ([facility]) [[id number]]   Admitting Physician: ELOISA MORE [[id number]]   Attending Physician: ELOISA MORE [[id number]]   Certification: I Certify That Inpatient Hospital Services Are Medically Necessary For Greater Than 2 Midnights               This medical record created using voice recognition software.        I, Anna Cantu, am scribing for Dr. Donovan Flower. Information recorded by the scribe has been verified and validated by the provider.       Cantu, Anna C  09/14/22 [id number]       Larissa Magallon MD  09/15/22 0142

## 2022-09-15 NOTE — THERAPY EVALUATION
Acute Care - Speech Language Pathology   Swallow Initial Evaluation JOCELYN Butterfield     Patient Name: Wood Desai  : 1940  MRN: [id number]  Today's Date: 9/15/2022               Admit Date: 2022    Visit Dx:     ICD-10-CM ICD-9-CM   1. Contusion of scalp, initial encounter  S00.03XA 920   2. Fall, initial encounter  W19.XXXA E888.9   3. Pleural effusion  J90 511.9   4. SAH (subarachnoid hemorrhage) (HCC)  I60.9 430   5. Oropharyngeal dysphagia  R13.12 787.22     Patient Active Problem List   Diagnosis   • Acute cystitis   • BPH (benign prostatic hyperplasia)   • Gout   • High blood pressure   • Coronary artery disease involving native heart without angina pectoris   • Pneumonia of left lung due to infectious organism   • Elevated troponin   • Benign prostatic hyperplasia with urinary frequency   • Malignant neoplasm of urinary bladder (HCC)   • SAH (subarachnoid hemorrhage) (HCC)     Past Medical History:   Diagnosis Date   • Acute cystitis 2018   • BPH (benign prostatic hyperplasia)    • Brain bleed (HCC)    • Broken ankle    • Broken neck (HCC)    • Coronary artery disease involving native heart without angina pectoris 2022   • Gout    • High blood pressure    • Myocardial infarction (HCC)      Past Surgical History:   Procedure Laterality Date   • COLON SURGERY     • CRANIOTOMY     • CYSTOSCOPY     • EXTERNAL EAR SURGERY     • HERNIA REPAIR     • NECK SURGERY     • NOSE SURGERY     • SHOULDER SURGERY     • SKIN CANCER EXCISION     • SPLENECTOMY             Inpatient Speech Pathology Dysphagia Evaluation        PAIN SCALE: Patient has been complaining of abdominal pain, did not rate.  Nursing aware.    PRECAUTIONS/CONTRAINDICATIONS: Standard    SUSPECTED ABUSE/NEGLECT/EXPLOITATION: None indicated.    SOCIAL/PSYCHOLOGICAL NEEDS/BARRIERS: None indicated.    PAST SOCIAL HISTORY: 81-year-old male lives at home with his wife    PRIOR FUNCTION: Had been eating soft diet, PEG to  supplement.    PATIENT GOALS/EXPECTATIONS: Wanting to eat orally, wanting to have PEG removed    HISTORY: 81-year-old male with the above diagnosis is referred for speech therapy evaluation to assess for swallowing.  Patient has received speech pathology services in the past.  No services noted this incident.      CURRENT DIET LEVEL:  NPO    OBJECTIVE:    TEST ADMINISTERED: Clinical dysphagia evaluation    COGNITION/SAFETY AWARENESS: Patient followed directions and answer questions without difficulty    BEHAVIORAL OBSERVATIONS: Alert and cooperative    ORAL MOTOR EXAM: Mild decreased oral motor coordination.    VOICE QUALITY: Adequate    REFLEX EXAM: Deferred    POSTURE: Assisted sitting upright in bed    FEEDING/SWALLOWING FUNCTION: Assessed with nectar liquid, thin liquids, puréed solids, crunchy solid.    CLINICAL OBSERVATIONS: Nectar liquid by spoon and by cup with delay, double swallow noted, vocal quality remaining clear to cervical auscultation.  Thin liquid by cup and by straw with patient taking controlled single sips, delayed swallow, vocal quality remaining clear to cervical auscultation.  Purée solid with swallow completed with decreased laryngeal elevation noted to palpation 4 -/5.  Patient producing double swallow.  Crunchy solid with small bite, adequate chewing followed by swallow completed clearing the oral cavity.  Patient took medications whole with applesauce requiring double swallow.    DYSPHAGIA CRITERIA: Decreased laryngeal ovation, decreased oral motor coordination, minimal decreased coordination of swallow.    FUNCTIONAL ASSESSMENT INSTRUMENT: Patient currently scored a level 5 of 7 on Functional Communication Measures for swallowing indicating a 20-39% limitation in function.    ASSESSMENT/ PLAN OF CARE:  Pt presents with limitations, noted below, that impede his ability to swallow safely and maintain nutrition. The skills of a therapist will be required to safely and effectively implement  the following treatment plan to restore maximal level of function.    PROBLEMS:  1.   Swallow delay, decreased oral motor coordination, decreased coordination of swallow risk of aspiration                       LTG 1: 30 days: Patient will tolerate least restrictive diet utilizing appropriate positioning and strategies with minimal assistance.                                            STG 1a: 14 days: Patient will tolerate diet of mechanical soft solids and thin liquids with minimal assistance for strategies at 80% intake.                       STG 1b: 14 days: Patient will demonstrate adequate swallow and 8 of 10 trials of solids with min to no signs or symptoms of aspiration.                       STG 1c: 14 days: Patient will participate in oral motor/laryngeal exercise program with strength/range of motion 4/5.                       STG 1d: 14 days: Patient/family education.                       TREATMENT: Dysphagia therapy to address swallow function through exercises and education of strategies.     FREQUENCY/DURATION: Twice daily 5 times per week.    REHAB POTENTIAL:  Pt has good rehab potential.  The following limitations may influence improvement/ length of tx: Medical status.    RECOMMENDATIONS:   1.   DIET: Mechanical soft solid, ground meats and gravies, thin liquid.    2.  POSITION: Positioning fully upright for all p.o. intake and 30 minutes following.    3.  COMPENSATORY STRATEGIES: Assist patient to feed self, alternate small bites and small sips of solids and liquids at a slow rate.  Small controlled sips of liquid.  Medications whole in applesauce or ice cream.  Note discussed plan for patient to be tolerating all nutrition and hydration as well as medications by mouth prior to considering removal of PEG.    Pt/responsible party agrees with plan of care and has been informed of all alternatives, risks and benefits.                            Anticipated Discharge Disposition (SLP): home with home  health (09/15/22 1026)                                                      EDUCATION  The patient has been educated in the following areas:   Dysphagia (Swallowing Impairment) Modified Diet Instruction.              Time Calculation:    Time Calculation- SLP     Row Name 09/15/22 1026             Time Calculation- SLP    SLP Start Time 0830  -TB      SLP Stop Time 0930  -TB      SLP Time Calculation (min) 60 min  -TB      SLP Received On 09/15/22  -TB              Untimed Charges    SLP Eval/Re-eval  ST Eval Oral Pharyng Swallow - 10733  -TB      71048-OS Eval Oral Pharyng Swallow Minutes 60  -TB              Total Minutes    Untimed Charges Total Minutes 60  -TB       Total Minutes 60  -TB            User Key  (r) = Recorded By, (t) = Taken By, (c) = Cosigned By    Initials Name Provider Type    TB Leann Whyte SLP Speech and Language Pathologist                Therapy Charges for Today     Code Description Service Date Service Provider Modifiers Qty    [id number] HC ST EVAL ORAL PHARYNG SWALLOW 4 9/15/2022 Leann Whyte SLP GN 1               JOSE J Noel  9/15/2022

## 2022-09-15 NOTE — PLAN OF CARE
Goal Outcome Evaluation:  Plan of Care Reviewed With: patient, spouse      ASSESSMENT/ PLAN OF CARE:  Pt presents with limitations, noted below, that impede his ability to swallow safely and maintain nutrition. The skills of a therapist will be required to safely and effectively implement the following treatment plan to restore maximal level of function.    PROBLEMS:  1.   Swallow delay, decreased oral motor coordination, decreased coordination of swallow risk of aspiration                                              TREATMENT: Dysphagia therapy to address swallow function through exercises and education of strategies.     FREQUENCY/DURATION: Twice daily 5 times per week.    REHAB POTENTIAL:  Pt has good rehab potential.  The following limitations may influence improvement/ length of tx: Medical status.    RECOMMENDATIONS:   1.   DIET: Mechanical soft solid, ground meats and gravies, thin liquid.    2.  POSITION: Positioning fully upright for all p.o. intake and 30 minutes following.    3.  COMPENSATORY STRATEGIES: Assist patient to feed self, alternate small bites and small sips of solids and liquids at a slow rate.  Small controlled sips of liquid.  Medications whole in applesauce or ice cream.  Note discussed plan for patient to be tolerating all nutrition and hydration as well as medications by mouth prior to considering removal of PEG.

## 2022-09-15 NOTE — PLAN OF CARE
Goal Outcome Evaluation:  Plan of Care Reviewed With: patient, spouse        Progress: no change  Outcome Evaluation: Patient presents with deficits in balance, endurance, transfers, and ambulation. Patient will benefit from skilled PT services to address these mobility deficits and decrease risk of falls. Recommend return home with home health and assist from spouse following hospital discharge.

## 2022-09-16 ENCOUNTER — READMISSION MANAGEMENT (OUTPATIENT)
Dept: CALL CENTER | Facility: HOSPITAL | Age: 82
End: 2022-09-16

## 2022-09-16 VITALS
RESPIRATION RATE: 20 BRPM | BODY MASS INDEX: 22.46 KG/M2 | WEIGHT: 165.79 LBS | HEIGHT: 72 IN | TEMPERATURE: 97.7 F | OXYGEN SATURATION: 96 % | DIASTOLIC BLOOD PRESSURE: 84 MMHG | SYSTOLIC BLOOD PRESSURE: 172 MMHG | HEART RATE: 62 BPM

## 2022-09-16 LAB
ALBUMIN SERPL-MCNC: 3.9 G/DL (ref 3.5–5.2)
ALBUMIN/GLOB SERPL: 1.1 G/DL
ALP SERPL-CCNC: 180 U/L (ref 39–117)
ALT SERPL W P-5'-P-CCNC: 25 U/L (ref 1–41)
ANION GAP SERPL CALCULATED.3IONS-SCNC: 10.4 MMOL/L (ref 5–15)
AST SERPL-CCNC: 19 U/L (ref 1–40)
BASOPHILS # BLD AUTO: 0.04 10*3/MM3 (ref 0–0.2)
BASOPHILS NFR BLD AUTO: 0.3 % (ref 0–1.5)
BILIRUB SERPL-MCNC: 0.7 MG/DL (ref 0–1.2)
BUN SERPL-MCNC: 14 MG/DL (ref 8–23)
BUN/CREAT SERPL: 18.9 (ref 7–25)
CALCIUM SPEC-SCNC: 9.9 MG/DL (ref 8.6–10.5)
CHLORIDE SERPL-SCNC: 97 MMOL/L (ref 98–107)
CO2 SERPL-SCNC: 25.6 MMOL/L (ref 22–29)
CREAT SERPL-MCNC: 0.74 MG/DL (ref 0.76–1.27)
D-LACTATE SERPL-SCNC: 1 MMOL/L (ref 0.5–2)
DEPRECATED RDW RBC AUTO: 50.7 FL (ref 37–54)
EGFRCR SERPLBLD CKD-EPI 2021: 92.2 ML/MIN/1.73
EOSINOPHIL # BLD AUTO: 0.1 10*3/MM3 (ref 0–0.4)
EOSINOPHIL NFR BLD AUTO: 0.7 % (ref 0.3–6.2)
ERYTHROCYTE [DISTWIDTH] IN BLOOD BY AUTOMATED COUNT: 14.7 % (ref 12.3–15.4)
GLOBULIN UR ELPH-MCNC: 3.6 GM/DL
GLUCOSE SERPL-MCNC: 109 MG/DL (ref 65–99)
HCT VFR BLD AUTO: 42.6 % (ref 37.5–51)
HGB BLD-MCNC: 14.2 G/DL (ref 13–17.7)
IMM GRANULOCYTES # BLD AUTO: 0.03 10*3/MM3 (ref 0–0.05)
IMM GRANULOCYTES NFR BLD AUTO: 0.2 % (ref 0–0.5)
LARGE PLATELETS: NORMAL
LYMPHOCYTES # BLD AUTO: 2.37 10*3/MM3 (ref 0.7–3.1)
LYMPHOCYTES NFR BLD AUTO: 17.5 % (ref 19.6–45.3)
MAGNESIUM SERPL-MCNC: 2 MG/DL (ref 1.6–2.4)
MCH RBC QN AUTO: 31.3 PG (ref 26.6–33)
MCHC RBC AUTO-ENTMCNC: 33.3 G/DL (ref 31.5–35.7)
MCV RBC AUTO: 93.8 FL (ref 79–97)
MONOCYTES # BLD AUTO: 1.67 10*3/MM3 (ref 0.1–0.9)
MONOCYTES NFR BLD AUTO: 12.3 % (ref 5–12)
NEUTROPHILS NFR BLD AUTO: 69 % (ref 42.7–76)
NEUTROPHILS NFR BLD AUTO: 9.34 10*3/MM3 (ref 1.7–7)
NRBC BLD AUTO-RTO: 0 /100 WBC (ref 0–0.2)
PLATELET # BLD AUTO: 298 10*3/MM3 (ref 140–450)
PMV BLD AUTO: 11.7 FL (ref 6–12)
POTASSIUM SERPL-SCNC: 4.2 MMOL/L (ref 3.5–5.2)
PROT SERPL-MCNC: 7.5 G/DL (ref 6–8.5)
RBC # BLD AUTO: 4.54 10*6/MM3 (ref 4.14–5.8)
RBC MORPH BLD: NORMAL
SMALL PLATELETS BLD QL SMEAR: ADEQUATE
SODIUM SERPL-SCNC: 133 MMOL/L (ref 136–145)
WBC MORPH BLD: NORMAL
WBC NRBC COR # BLD: 13.55 10*3/MM3 (ref 3.4–10.8)

## 2022-09-16 PROCEDURE — 83735 ASSAY OF MAGNESIUM: CPT | Performed by: INTERNAL MEDICINE

## 2022-09-16 PROCEDURE — 99231 SBSQ HOSP IP/OBS SF/LOW 25: CPT | Performed by: NEUROLOGICAL SURGERY

## 2022-09-16 PROCEDURE — 80053 COMPREHEN METABOLIC PANEL: CPT | Performed by: INTERNAL MEDICINE

## 2022-09-16 PROCEDURE — 99233 SBSQ HOSP IP/OBS HIGH 50: CPT | Performed by: INTERNAL MEDICINE

## 2022-09-16 PROCEDURE — 99239 HOSP IP/OBS DSCHRG MGMT >30: CPT | Performed by: STUDENT IN AN ORGANIZED HEALTH CARE EDUCATION/TRAINING PROGRAM

## 2022-09-16 PROCEDURE — 85025 COMPLETE CBC W/AUTO DIFF WBC: CPT | Performed by: INTERNAL MEDICINE

## 2022-09-16 PROCEDURE — 85007 BL SMEAR W/DIFF WBC COUNT: CPT | Performed by: INTERNAL MEDICINE

## 2022-09-16 PROCEDURE — 83605 ASSAY OF LACTIC ACID: CPT | Performed by: INTERNAL MEDICINE

## 2022-09-16 RX ORDER — GABAPENTIN 250 MG/5ML
300 SOLUTION ORAL 3 TIMES DAILY PRN
Status: DISCONTINUED | OUTPATIENT
Start: 2022-09-16 | End: 2022-09-16 | Stop reason: HOSPADM

## 2022-09-16 RX ORDER — PRIMIDONE 250 MG/1
250 TABLET ORAL 3 TIMES DAILY
Status: DISCONTINUED | OUTPATIENT
Start: 2022-09-16 | End: 2022-09-16 | Stop reason: HOSPADM

## 2022-09-16 RX ORDER — DOCUSATE SODIUM 100 MG/1
100 CAPSULE, LIQUID FILLED ORAL 2 TIMES DAILY
Status: DISCONTINUED | OUTPATIENT
Start: 2022-09-16 | End: 2022-09-16 | Stop reason: SDUPTHER

## 2022-09-16 RX ORDER — POLYETHYLENE GLYCOL 3350 17 G/17G
17 POWDER, FOR SOLUTION ORAL DAILY
Status: DISCONTINUED | OUTPATIENT
Start: 2022-09-16 | End: 2022-09-16 | Stop reason: HOSPADM

## 2022-09-16 RX ORDER — DOCUSATE SODIUM 50 MG/5 ML
100 LIQUID (ML) ORAL 2 TIMES DAILY
Status: DISCONTINUED | OUTPATIENT
Start: 2022-09-16 | End: 2022-09-16 | Stop reason: HOSPADM

## 2022-09-16 RX ADMIN — WHITE PETROLATUM 1 APPLICATION: 1.75 OINTMENT TOPICAL at 01:14

## 2022-09-16 RX ADMIN — TRAMADOL HYDROCHLORIDE 50 MG: 50 TABLET, COATED ORAL at 02:58

## 2022-09-16 RX ADMIN — PRIMIDONE 250 MG: 250 TABLET ORAL at 10:25

## 2022-09-16 RX ADMIN — PRIMIDONE 250 MG: 250 TABLET ORAL at 15:03

## 2022-09-16 RX ADMIN — FINASTERIDE 5 MG: 5 TABLET, FILM COATED ORAL at 10:25

## 2022-09-16 RX ADMIN — POLYETHYLENE GLYCOL 3350 17 G: 17 POWDER, FOR SOLUTION ORAL at 10:24

## 2022-09-16 RX ADMIN — METOPROLOL TARTRATE 75 MG: 25 TABLET, FILM COATED ORAL at 10:24

## 2022-09-16 RX ADMIN — Medication 10 ML: at 10:25

## 2022-09-16 RX ADMIN — TRAMADOL HYDROCHLORIDE 50 MG: 50 TABLET, COATED ORAL at 13:05

## 2022-09-16 RX ADMIN — DOCUSATE SODIUM LIQUID 100 MG: 100 LIQUID ORAL at 10:29

## 2022-09-16 RX ADMIN — WHITE PETROLATUM 1 APPLICATION: 1.75 OINTMENT TOPICAL at 13:06

## 2022-09-16 RX ADMIN — TRAMADOL HYDROCHLORIDE 50 MG: 50 TABLET, COATED ORAL at 09:06

## 2022-09-16 RX ADMIN — ACETAMINOPHEN 650 MG: 325 TABLET ORAL at 15:01

## 2022-09-16 RX ADMIN — LEVOTHYROXINE SODIUM 50 MCG: 50 TABLET ORAL at 06:31

## 2022-09-16 NOTE — PROGRESS NOTES
Pulmonary / Critical Care Progress Note      Patient Name: Wood Desai  : 1940  MRN: [id number]  Attending:  Job Peralta DO   Date of admission: 2022    Subjective   Subjective   Follow-up for subarachnoid hemorrhage    Over past 24 hours:  Repeat head CT shows improving subarachnoid hemorrhage  Resting bed comfortably  Abdominal pain is somewhat better.  CT of the abdomen pelvis just showed significant constipation  Does have small left pleural effusion with left base atelectasis  Patient denies any respiratory complaints  Is weak and fatigue  No chest pain  No nausea, fevers or chills.  Did vomit once yesterday      Review of Systems  Constitutional symptoms:  Denied complaints   Ear, nose, throat: Denied complaints  Cardiovascular:  Denied complaints  Respiratory: Denied complaints  Gastrointestinal: Abdominal pain improved, otherwise denied complaints  Musculoskeletal: Denied complaints  Neurologic: Denied complaints  Skin: Denied complaints        Objective   Objective     Vitals:   Vital signs for last 24 hours:  Temp:  [97.6 °F (36.4 °C)-99.3 °F (37.4 °C)] 97.6 °F (36.4 °C)  Heart Rate:  [68-84] 68  Resp:  [14-21] 18  BP: (134-168)/() 139/77    Intake/Output last 3 shifts:  I/O last 3 completed shifts:  In: -   Out: 850 [Urine:850]  Intake/Output this shift:  No intake/output data recorded.    Vent settings for last 24 hours:       Hemodynamic parameters for last 24 hours:       Physical Exam   Vital Signs Reviewed   General:  WDWN, Alert, NAD.    HEENT:  PERRL, EOMI.  OP, nares clear  Neck:  Supple, no JVD, no thyromegaly  Chest:  good aeration, diminished left chest with left base rhonchi, dull to percussion left chest, no work of breathing noted  CV: RRR, no MGR, pulses 2+, equal.  Abd:  Soft,  nontender, nondistended, + BS, no HSM, PEG tube site is C/C/I  EXT:  no clubbing, no cyanosis, no edema  Neuro:  A&Ox3, CN grossly intact, no focal deficits.  Skin: No rashes or  lesions noted        Result Review    Result Review:  I have personally reviewed the results from the time of this admission to 9/16/2022 11:43 EDT and agree with these findings:  [x]  Laboratory  [x]  Microbiology  [x]  Radiology  [x]  EKG/Telemetry   []  Cardiology/Vascular   []  Pathology  [x]  Old records  []  Other:  Most notable findings include:   CT of the abdomen and pelvis with constipation.  No obvious intra-abdominal pathology.  Does have small left pleural effusion with left lower lobe atelectasis.      Assessment & Plan   Assessment / Plan     Active Hospital Problems:  Active Hospital Problems    Diagnosis    • SAH (subarachnoid hemorrhage) ([facility])          Impression:  Traumatic subarachnoid hemorrhage  Mechanical fall  Acute abdominal pain improved.  Due to constipation  Constipation  Left lower lobe atelectasis  Small left pleural effusion  Hypothyroidism     Plan:  Repeat head CT shows decreasing subarachnoid hemorrhage.  Appreciate neurosurgical recommendations.  Holding home aspirin for now  Blood pressure controlled.  Continue current doses of metoprolol  CT of the abdomen and pelvis did show significant constipation.  I have added aggressive bowel regimen.  It also shows a small left pleural effusion and left lower lobe atelectasis.  I did discuss with the patient about performing a diagnostic and therapeutic ultrasound-guided thoracentesis.  At this point, he defers citing lack of respiratory symptoms.  Tube feeds at goal per dietitian recommendations  Continue home dose of metoprolol  Continue home Synthroid, Seroquel, Proscar, primidone    DVT prophylaxis:  Mechanical DVT prophylaxis orders are present.    CODE STATUS:   Code Status (Patient has no pulse and is not breathing): CPR (Attempt to Resuscitate)  Medical Interventions (Patient has pulse or is breathing): Full Support  Release to patient: Routine Release    Okay to transfer out of ICU      Labs, imaging, microbiology, notes and  medications personally reviewed  Discussed with primary.  Discussed on multidisciplinary critical care rounds.    Electronically signed by Simone Muñoz MD, 09/16/22, 11:45 AM EDT.

## 2022-09-16 NOTE — PROGRESS NOTES
[facility]         HOSPITALIST  DISCHARGE SUMMARY    Patient Name: Wood Desai  : 1940  MRN: [id number]    Date of Admission: 2022  Date of Discharge: 2022  Primary Care Physician: Lea Fallon MD    Consults     Date and Time Order Name Status Description    9/15/2022  5:54 AM Inpatient Pulmonology Consult Completed     9/15/2022  4:00 AM Inpatient Neurosurgery Consult Completed     9/15/2022 12:26 AM Inpatient Hospitalist Consult      9/15/2022 12:25 AM IP General Consult (Use specialty-specific consult if known)            Active and Resolved Hospital Problems:  Active Hospital Problems    Diagnosis POA   • SAH (subarachnoid hemorrhage) (HCC) [I60.9] Yes      Resolved Hospital Problems   No resolved problems to display.       Hospital Course     Hospital Course:   Wood Desai is a 81 y.o. male who presented status post fall at home.  Upon his presentation patient had CT of his head that showed a subarachnoid hemorrhage.  Patient was admitted and placed in ICU for further monitoring.  Neurosurgery was consulted and evaluated the patient and recommended repeat imaging.  Patient had repeat imaging which showed improvement in his subarachnoid hemorrhage with no new findings.  Patient was able to ambulate with walker prior to discharge.  Per patient's wife he was much improved at his baseline.  Patient did have elevated blood pressure readings and required as needed medication intermittently.  Patient remained stable and will be discharged home to continue with his therapeutic plan.  He will continue his other home medications as before.  He was discharged in stable condition.  Follow-up with PCP in 1 week.  Neurosurgery stated patient did not need follow-up at discharge.      Discharge problem list:  Traumatic subarachnoid hemorrhage  Mechanical fall  Acute abdominal pain improved.  Due to constipation  Constipation  Left lower lobe atelectasis  Small left pleural  effusion  Hypothyroidism      DISCHARGE Follow Up Recommendations for labs and diagnostics: PCP 1 week.      Day of Discharge     Vital Signs:  Temp:  [97.6 °F (36.4 °C)-99.3 °F (37.4 °C)] 97.7 °F (36.5 °C)  Heart Rate:  [62-84] 62  Resp:  [14-21] 20  BP: (135-172)/() 172/84  Physical Exam:   Constitutional: Alert, awake, no acute distress  HEENT:  PERRLA, EOMI; No Scleral icterus  Neck:  Supple; No Mass, No Lymphadenopathy  Cardiovascular:  No Rubs, No Edema, No JVD  Respiratory: No respiratory distress, unlabored breathing, saturating well on room air  Abdomen:  Normal BS all 4 Quadrants; No Guarding, No Tenderness  Musculoskeletal:  Pulses Positive all 4 Ext; No Cyanosis, No Edema  Neurological:  Alert+Ox3, Mental status WNL; No Dysarthria  Skin:  No Rash, No Cellulitis, No Erythema    Discharge Details        Discharge Medications      Changes to Medications      Instructions Start Date   primidone 250 MG tablet  Commonly known as: MYSOLINE  What changed:   · how much to take  · when to take this   750 mg, Per G Tube, Nightly         Continue These Medications      Instructions Start Date   aspirin 81 MG chewable tablet   81 mg, Per J Tube, Daily      docusate sodium 50 mg/5 mL liquid  Commonly known as: COLACE   100 mg, Per G Tube, 2 Times Daily      finasteride 5 MG tablet  Commonly known as: PROSCAR   5 mg, Per G Tube, Daily      gabapentin 50 mg/mL solution  Commonly known as: NEURONTIN   300 mg, Per G Tube, 3 Times Daily PRN      jevity 1.5 edward liquid   Continuous infusion      lactulose 10 GM/15ML solution  Commonly known as: CHRONULAC   20 g, Per G Tube, As Needed      lansoprazole 3 mg/mL in sodium bicarbonate injection 8.4%   15 mg, Per G Tube, Daily      levETIRAcetam 1000 MG tablet  Commonly known as: KEPPRA   1,000 mg, Per G Tube, 2 Times Daily      levothyroxine 50 MCG tablet  Commonly known as: SYNTHROID, LEVOTHROID   50 mcg, Per G Tube, Daily      melatonin 5 MG tablet tablet   5 mg, Per G  Tube, Nightly      Metoprolol Tartrate 75 MG tablet   75 mg, Per G Tube, Every 12 Hours Scheduled      mupirocin 2 % cream  Commonly known as: BACTROBAN   1 application, Topical, 3 Times Daily      nystatin [id number] UNIT/GM cream  Commonly known as: MYCOSTATIN   1 application, Topical, 3 Times Daily      potassium phosphate (monobasic) 500 MG tablet  Commonly known as: K-PHOS   500 mg, Per G Tube, 3 Times Daily      QUEtiapine 50 MG tablet  Commonly known as: SEROquel   50 mg, Per G Tube, Nightly      V-R HYDROCORTISONE 1 % cream  Generic drug: hydrocortisone   1 application, Topical, 3 Times Daily             No Known Allergies    Discharge Disposition:  Home or Self Care    Diet:  Hospital:  Diet Order   Procedures   • Diet Soft Texture; Ground; Thin       Discharge Activity:       CODE STATUS:  Code Status and Medical Interventions:   Ordered at: 09/15/22 0728     Code Status (Patient has no pulse and is not breathing):    CPR (Attempt to Resuscitate)     Medical Interventions (Patient has pulse or is breathing):    Full Support     Release to patient:    Routine Release         No future appointments.    [unfilled]    Pertinent  and/or Most Recent Results     PROCEDURES:   CT Abdomen Pelvis Without Contrast    Result Date: 9/15/2022  Narrative: PROCEDURE: CT ABDOMEN PELVIS WO CONTRAST  COMPARISON: [facility], CT, CT CHEST W CONTRAST DIAGNOSTIC, 9/14/2022, 22:12.  INDICATIONS: Abdominal pain, acute, nonlocalized  TECHNIQUE: CT images were created without intravenous contrast.   PROTOCOL:   Standard imaging protocol performed    RADIATION:   DLP: 631mGy*cm   Automated exposure control was utilized to minimize radiation dose.  FINDINGS:  Small left-sided pleural effusion is present.  Patchy airspace changes present within the left lung base.  The liver, pancreas and spleen demonstrate no acute process.  The pancreas appears atrophic.  The gallbladder appears unremarkable.  No evidence of biliary  dilatation. The adrenal glands appear unremarkable.  The kidneys appear normal in size.  Nonobstructing calculi are present bilaterally.  Simple cyst present on the left.  No evidence of hydronephrosis.  No focal lesions identified.  No dilated  loops of bowel identified.  No evidence of obstruction.  No free air.  No mesenteric fluid collections identified.  The appendix is not well visualized.  No secondary findings of appendicitis.  Atherosclerotic calcifications are present..  Large stool burden present throughout the colon.  Diverticulosis is present.  No evidence of inflammatory change.  A large amount of stool is present within the rectum.  No suspicious adenopathy identified.  No significant free fluid.  The pelvic organs demonstrate no acute process.  No acute osseous abnormality is identified.       Impression:   1. No acute intra-abdominal or intrapelvic process. 2. Large stool burden present throughout the colon and the rectum consistent with constipation/obstipation. 3. Ancillary findings as described above.     YARIEL FIELD MD       Electronically Signed and Approved By: YARIEL FIELD MD on 9/15/2022 at 17:17             CT Head Without Contrast    Result Date: 9/15/2022  Narrative: PROCEDURE: CT HEAD WO CONTRAST  COMPARISON:  [facility], CT, CT HEAD WO CONTRAST, 2/15/2022, 23:42.  [facility], CT, CT HEAD WO CONTRAST, 9/14/2022, 22:08. INDICATIONS: Subarachnoid hemorrhage  PROTOCOL:   Standard imaging protocol performed    RADIATION:   DLP: 941mGy*cm   MA and/or KV was adjusted to minimize radiation dose.     TECHNIQUE: After obtaining the patient's consent, CT images were obtained without non-ionic intravenous contrast material.  FINDINGS:  Patient is again noted be status post large right-sided craniotomy.  There is a chronic low-density subdural fluid collection overlying the right frontal lobe measuring approximately 5 mm in thickness.  This is decreased in size from  2/15/2022 and unchanged from prior study of 9/14/2022.  There has been interval improvement in posttraumatic subarachnoid hemorrhage along the medial right frontal lobe.  There is no evidence of new or increasing hemorrhage.  There is moderate generalized parenchymal volume loss.  No intraventricular hemorrhage.  No evidence of acute infarct.  No new abnormal extra-axial fluid collection.  No mass effect or hydrocephalus.  Visualized paranasal sinuses and mastoid air cells are clear.  Globes and orbits are within normal limits.      Impression:   1. 1. Improving posttraumatic subarachnoid hemorrhage within the medial right frontal lobe.  No new or worsening intracranial hemorrhage identified. 2. No change in small low-density extra-axial fluid collection overlying the right frontal lobe.  Patient is again noted be status post large right hemispheric craniotomy.     KRISTY CIFUENTES MD       Electronically Signed and Approved By: KRISTY CIFUENTES MD on 9/15/2022 at 17:14             CT Head Without Contrast    Result Date: 9/14/2022  Narrative: PROCEDURE: CT HEAD WO CONTRAST  COMPARISON: 2/15/2022.  INDICATIONS: fall, head injury  PROTOCOL:   Standard imaging protocol performed    RADIATION:   DLP: 1018.2mGy*cm   MA and/or KV was adjusted to minimize radiation dose.    TECHNIQUE: After obtaining the patient's consent, 136 CT images were obtained without non-ionic intravenous contrast material.  DISCUSSION:  A routine nonenhanced head CT was performed.  Mild-to-moderate chronic small vessel ischemia/infarction is suspected. There are arterial and basal ganglia calcifications. The extra-axial spaces and the ventricular system are prominent.  There has been Interval decrease in size of the chronic subdural hematoma along the right frontal cerebral convexity.  It now measures about 7 mm in greatest transverse extent.  Previously, it measured about 1.1 cm.  There is new minimal acute subarachnoid or intraparenchymal contusion  involving the right frontal lobe, especially present inferiorly, such as seen on images 21 and 22 of series 201 and adjacent images.  There may also be new minimal acute subarachnoid hemorrhage, as seen on image 28 of series 201, in the more lateral right frontal region.  These findings suggest an acute contrecoup injury.  A large acute hemorrhagic scalp contusion is seen in the left parietal region, measuring approximately 10 x 1.2 cm, as on image 46 of series 201 and adjacent images.  No acute skull fracture is seen.  The patient has undergone anterior parasagittal craniotomy.  Scattered opacities are seen in the right mastoid air cell complex, present previously.  There may be mild age-indeterminate sinus disease.  No definite air-fluid interfaces are seen in the imaged paranasal sinuses.  There are degenerative changes of partially imaged cervical spine.  There are suspected extensive postoperative changes of the cervical spine, partially imaged on this exam.      Impression:   There is a small amount of acute intracranial hemorrhage, predominantly representing acute subarachnoid hemorrhage, in the right frontal lobe, likely related to an acute contrecoup injury.  A large hemorrhagic left parietal scalp contusion is seen.  No definite acute skull fracture is appreciated.  Probably no acute infarct.  No midline shift or acute intracranial herniation syndrome.  The chronic right-sided cerebral convexity subdural hematoma has decreased in size slightly since 2/15/2022.  Please see above comments for further detail.    Pertinent findings were phoned to [facility] ED Physician, Dr. Magallon (who was covering for Dr. Flower, ordering/attending [facility] ED Physician), at approximately 2350 hours on 9/14/2022.  Please note that portions of this note were completed with a voice recognition program.  ORESTES MALCOLM JR, MD       Electronically Signed and Approved By: ORESTES MALCOLM JR, MD on 9/14/2022 at 23:54              CT Chest  With Contrast Diagnostic    Result Date: 9/15/2022  Narrative: PROCEDURE: CT CHEST W CONTRAST DIAGNOSTIC  COMPARISONS: 9/14/2022; 2/15/2022; 4/29/2021.  INDICATIONS: abnormal chest x-ray  TECHNIQUE: After obtaining the patient's consent, 701 CT images were obtained with non-ionic intravenous contrast material.   PROTOCOL:   Standard CT imaging protocol performed.    RADIATION:   Total DLP: 355 mGy*cm   Automated exposure control was utilized to minimize radiation dose. CONTRAST: 100 mL Isovue 370 I.V.  FINDINGS: The patient's upper extremities in the scan field of view obscure detail.  There is a new small to moderate sized left pleural effusion.  It has CT numbers ranging between 3 and 20 Hounsfield units.  It is likely not hemorrhagic.  It does not appear to be loculated.  Minimal, if any, right-sided pleural effusion is seen.  There is mild to moderate cardiomegaly.  No significant pericardial effusion is seen.  There is slight chronic asymmetric elevation of the left diaphragm.  Atelectasis, infiltrate(s), and/or fibrosis is seen in the left lung, especially the lingula and the left lower lobe, increased in degree since the prior 4/29/2021 study.  The patchy ground-glass opacities within the right lung, especially the right lower lobe and right middle lobe, have resolved.  There may be mild atelectasis in the right lung.  The central tracheobronchial tree is well aerated without filling defect.  Peribronchial thickening is seen, especially in the left lower lobe.  No pneumothorax.  No pneumomediastinum.  Extensive postoperative changes involve the cervical spine, partially imaged on this exam.  There are degenerative changes throughout the imaged spine.  Degenerative changes involve the shoulders.  No definite acute rib fracture is seen.  Probably no acute findings are seen within the partially imaged upper abdomen.  Benign left renal cysts are suspected.  One the largest involves the posterior upper pole of the  left kidney, measuring about 2.7 cm in greatest diameter.  There is mild fusiform dilatation of the ascending aorta, which has a maximum diameter of about 4.7 cm.  Previously, it measured about 4.7 cm.  No thoracic aortic dissection is seen.  Consider close interval clinical and imaging follow-up of these findings.  No aneurysmal dilatation of the descending aorta.  No definite pulmonary embolism is seen.  The contrast bolus within the pulmonary arteries is somewhat limited.  The spleen is not clearly identified.  It may be surgically absent.  A percutaneous gastrostomy tube is partially seen on the study.  There is a chronic T12 vertebral compression fracture, estimated at 30 percent or less in degree, with minimal if any posterior wall retropulsion.  This finding was seen previously on the 4/29/2021 study.  There is a superior sternal fracture, which is new since 4/29/2021.  It is thought most likely to be subacute to chronic in nature.  It is nondisplaced.  There is periosteal reaction, callus formation, and sclerosis about the center of the expected fracture line.  No other definite acute fractures are suspected.  There are small scattered mediastinal lymph nodes.  Probably no enlarged mediastinal lymph nodes are seen.  Coronary artery calcifications are present.  There is probably bilateral gynecomastia.  No enlargement of the thyroid gland.  Probably no enlarged axillary lymph nodes are seen.      Impression:   1. There is a new left-sided pleural effusion, small to moderate size.  Minimal, if any, right pleural effusion is seen.  2. Atelectasis, infiltrate(s), and/or fibrosis is seen in the left lung base, increased since the prior study.  Pneumonia cannot be excluded, including aspiration pneumonia.  3. Interval resolution of the somewhat ground-glass opacities in the right lung base since 4/29/2021.  4. There is mild to moderate cardiomegaly.  5. 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   No thoracic aortic dissection.  No descending aortic aneurysm.  No acute mediastinal hematoma.  No acute hemothorax is suspected.  6. There are postoperative changes cervical spine.  7. There is a new chronic nondisplaced superior sternal fracture.  8. There is a percutaneous gastrostomy tube in place.  It is partially imaged on the study.  9. 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  10. No pneumothorax is identified.  No pneumomediastinum. 11. Please see above comments for further detail.   1.   Please note that portions of this note were completed with a voice recognition program.  ORESTES MALCOLM JR, MD       Electronically Signed and Approved By: ORESTES MALCOLM JR, MD on 9/15/2022 at 0:11              XR Chest 1 View    Result Date: 9/14/2022  Narrative: PROCEDURE: XR CHEST 1 VW  COMPARISON: [facility], , XR CHEST 1 VW, 2/15/2022, 21:10.  INDICATIONS: Weak/Dizzy/AMS triage protocol, fall  FINDINGS:  Single frontal view chest is compared previous study performed on 2/15/2022.  Today's study reveals the patient is status post surgical fusion of multiple levels in the inferior cervical spine with metallic plate and screws and bilateral pedicular screws and bilateral posterior metallic vertical rods.  There is a moderate size area of infiltrate and effusion in the left lower lobe lung appears to have gotten worse since previous study.  No acute or focal abnormality seen the right lung.      Impression:   1. Moderate size infiltrate and effusion appears to have gotten worse in the left lower lobe lung since previous study performed on 2/15/2022 CT of the chest might be helpful to further evaluate this abnormality to rule out the possibility of occult mass       SHANNA TALBOT MD       Electronically Signed and Approved By: SHANNA TALBOT MD on 9/14/2022 at 20:54                 LAB RESULTS:      Lab 09/16/22  0310 09/15/22  0841 09/14/22 2028   WBC 13.55* 13.34* 13.16*    HEMOGLOBIN 14.2 13.1 13.8   HEMATOCRIT 42.6 39.2 41.0   PLATELETS 298 344 356   NEUTROS ABS 9.34* 9.80* 8.80*   IMMATURE GRANS (ABS) 0.03 0.05 0.14*   LYMPHS ABS 2.37 1.92 2.19   MONOS ABS 1.67* 1.40* 1.61*   EOS ABS 0.10 0.13 0.39   MCV 93.8 93.1 92.3   LACTATE 1.0  --   --          Lab 09/16/22  0310 09/15/22  0620 09/14/22  2028   SODIUM 133* 134* 138   POTASSIUM 4.2 4.2 4.4   CHLORIDE 97* 97* 98   CO2 25.6 26.5 32.1*   ANION GAP 10.4 10.5 7.9   BUN 14 16 18   CREATININE 0.74* 0.68* 0.75*   EGFR 92.2 94.6 91.8   GLUCOSE 109* 123* 122*   CALCIUM 9.9 9.3 10.0   MAGNESIUM 2.0 2.1 2.2   HEMOGLOBIN A1C  --  6.50*  --          Lab 09/16/22  0310 09/15/22  0620 09/14/22  2028   TOTAL PROTEIN 7.5 6.9 7.4   ALBUMIN 3.90 3.90 4.20   GLOBULIN 3.6 3.0 3.2   ALT (SGPT) 25 22 24   AST (SGOT) 19 17 19   BILIRUBIN 0.7 0.5 0.2   ALK PHOS 180* 163* 168*   LIPASE  --  17  --          Lab 09/15/22  0620 09/14/22  2028   PROBNP 296.7  --    TROPONIN T  --  0.024         Lab 09/15/22  0620   CHOLESTEROL 122   LDL CHOL 59   HDL CHOL 49   TRIGLYCERIDES 70             Brief Urine Lab Results     None        Microbiology Results (last 10 days)     ** No results found for the last 240 hours. **          CT Abdomen Pelvis Without Contrast    Result Date: 9/15/2022  Impression:   1. No acute intra-abdominal or intrapelvic process. 2. Large stool burden present throughout the colon and the rectum consistent with constipation/obstipation. 3. Ancillary findings as described above.     YARIEL FIELD MD       Electronically Signed and Approved By: YARIEL FIELD MD on 9/15/2022 at 17:17             CT Head Without Contrast    Result Date: 9/15/2022  Impression:   1. 1. Improving posttraumatic subarachnoid hemorrhage within the medial right frontal lobe.  No new or worsening intracranial hemorrhage identified. 2. No change in small low-density extra-axial fluid collection overlying the right frontal lobe.  Patient is again noted be status post  large right hemispheric craniotomy.     KRISTY CIFUENTES MD       Electronically Signed and Approved By: KRISTY CIFUENTES MD on 9/15/2022 at 17:14             CT Head Without Contrast    Result Date: 9/14/2022  Impression:   There is a small amount of acute intracranial hemorrhage, predominantly representing acute subarachnoid hemorrhage, in the right frontal lobe, likely related to an acute contrecoup injury.  A large hemorrhagic left parietal scalp contusion is seen.  No definite acute skull fracture is appreciated.  Probably no acute infarct.  No midline shift or acute intracranial herniation syndrome.  The chronic right-sided cerebral convexity subdural hematoma has decreased in size slightly since 2/15/2022.  Please see above comments for further detail.    Pertinent findings were phoned to [facility] ED Physician, Dr. Magallon (who was covering for Dr. Flower, ordering/attending [facility] ED Physician), at approximately 2350 hours on 9/14/2022.  Please note that portions of this note were completed with a voice recognition program.  ORESTES MALCOLM JR, MD       Electronically Signed and Approved By: ORESTES MALCOLM JR, MD on 9/14/2022 at 23:54              CT Chest With Contrast Diagnostic    Result Date: 9/15/2022  Impression:   1. There is a new left-sided pleural effusion, small to moderate size.  Minimal, if any, right pleural effusion is seen.  2. Atelectasis, infiltrate(s), and/or fibrosis is seen in the left lung base, increased since the prior study.  Pneumonia cannot be excluded, including aspiration pneumonia.  3. Interval resolution of the somewhat ground-glass opacities in the right lung base since 4/29/2021.  4. There is mild to moderate cardiomegaly.  5. 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  No thoracic aortic dissection.  No descending aortic aneurysm.  No acute mediastinal hematoma.  No acute hemothorax is suspected.  6. There are postoperative changes cervical spine.  7. There is a new  chronic nondisplaced superior sternal fracture.  8. There is a percutaneous gastrostomy tube in place.  It is partially imaged on the study.  9. 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  10. No pneumothorax is identified.  No pneumomediastinum. 11. Please see above comments for further detail.   1.   Please note that portions of this note were completed with a voice recognition program.  ORESTES MALCOLM JR, MD       Electronically Signed and Approved By: ORESTES MALCOLM JR, MD on 9/15/2022 at 0:11              XR Chest 1 View    Result Date: 9/14/2022  Impression:   1. Moderate size infiltrate and effusion appears to have gotten worse in the left lower lobe lung since previous study performed on 2/15/2022 CT of the chest might be helpful to further evaluate this abnormality to rule out the possibility of occult mass       SHANNA TALBOT MD       Electronically Signed and Approved By: SHANNA TALBOT MD on 9/14/2022 at 20:54                           Labs Pending at Discharge:        Time spent on Discharge including face to face service:  32 minutes    Electronically signed by Job Peralta DO, 09/16/22, 3:51 PM EDT

## 2022-09-16 NOTE — CONSULTS
Patient resting in bed with eyes closed, not responding to his name being spoken. Spouse not available at this time. Will attempt follow up at a later time.

## 2022-09-16 NOTE — DISCHARGE SUMMARY
[facility]                                                                           HOSPITALIST  DISCHARGE SUMMARY     Patient Name: Wood Desai  : 1940  MRN: [id number]     Date of Admission: 2022  Date of Discharge: 2022  Primary Care Physician: Lea Fallon MD             Consults      Date and Time Order Name Status Description     9/15/2022  5:54 AM Inpatient Pulmonology Consult Completed       9/15/2022  4:00 AM Inpatient Neurosurgery Consult Completed       9/15/2022 12:26 AM Inpatient Hospitalist Consult         9/15/2022 12:25 AM IP General Consult (Use specialty-specific consult if known)                 Active and Resolved Hospital Problems:       Active Hospital Problems     Diagnosis POA   • SAH (subarachnoid hemorrhage) (HCC) [I60.9] Yes       Resolved Hospital Problems   No resolved problems to display.         Hospital Course      Hospital Course:   Wood Desai is a 81 y.o. male who presented status post fall at home.  Upon his presentation patient had CT of his head that showed a subarachnoid hemorrhage.  Patient was admitted and placed in ICU for further monitoring.  Neurosurgery was consulted and evaluated the patient and recommended repeat imaging.  Patient had repeat imaging which showed improvement in his subarachnoid hemorrhage with no new findings.  Patient was able to ambulate with walker prior to discharge.  Per patient's wife he was much improved at his baseline.  Patient did have elevated blood pressure readings and required as needed medication intermittently.  Patient remained stable and will be discharged home to continue with his therapeutic plan.  He will continue his other home medications as before.  He was discharged in stable condition.  Follow-up with PCP in 1 week.  Neurosurgery stated patient did not need follow-up at discharge.        Discharge problem list:  Traumatic subarachnoid hemorrhage  Mechanical fall  Acute abdominal pain  improved.  Due to constipation  Constipation  Left lower lobe atelectasis  Small left pleural effusion  Hypothyroidism        DISCHARGE Follow Up Recommendations for labs and diagnostics: PCP 1 week.        Day of Discharge      Vital Signs:  Temp:  [97.6 °F (36.4 °C)-99.3 °F (37.4 °C)] 97.7 °F (36.5 °C)  Heart Rate:  [62-84] 62  Resp:  [14-21] 20  BP: (135-172)/() 172/84  Physical Exam:   Constitutional: Alert, awake, no acute distress  HEENT:  PERRLA, EOMI; No Scleral icterus  Neck:  Supple; No Mass, No Lymphadenopathy  Cardiovascular:  No Rubs, No Edema, No JVD  Respiratory: No respiratory distress, unlabored breathing, saturating well on room air  Abdomen:  Normal BS all 4 Quadrants; No Guarding, No Tenderness  Musculoskeletal:  Pulses Positive all 4 Ext; No Cyanosis, No Edema  Neurological:  Alert+Ox3, Mental status WNL; No Dysarthria  Skin:  No Rash, No Cellulitis, No Erythema     Discharge Details               Discharge Medications            Changes to Medications      Instructions Start Date   primidone 250 MG tablet  Commonly known as: MYSOLINE  What changed:   · how much to take  · when to take this    750 mg, Per G Tube, Nightly                      Continue These Medications      Instructions Start Date   aspirin 81 MG chewable tablet    81 mg, Per J Tube, Daily        docusate sodium 50 mg/5 mL liquid  Commonly known as: COLACE    100 mg, Per G Tube, 2 Times Daily        finasteride 5 MG tablet  Commonly known as: PROSCAR    5 mg, Per G Tube, Daily        gabapentin 50 mg/mL solution  Commonly known as: NEURONTIN    300 mg, Per G Tube, 3 Times Daily PRN        jevity 1.5 edward liquid    Continuous infusion        lactulose 10 GM/15ML solution  Commonly known as: CHRONULAC    20 g, Per G Tube, As Needed        lansoprazole 3 mg/mL in sodium bicarbonate injection 8.4%    15 mg, Per G Tube, Daily        levETIRAcetam 1000 MG tablet  Commonly known as: KEPPRA    1,000 mg, Per G Tube, 2 Times Daily         levothyroxine 50 MCG tablet  Commonly known as: SYNTHROID, LEVOTHROID    50 mcg, Per G Tube, Daily        melatonin 5 MG tablet tablet    5 mg, Per G Tube, Nightly        Metoprolol Tartrate 75 MG tablet    75 mg, Per G Tube, Every 12 Hours Scheduled        mupirocin 2 % cream  Commonly known as: BACTROBAN    1 application, Topical, 3 Times Daily        nystatin [id number] UNIT/GM cream  Commonly known as: MYCOSTATIN    1 application, Topical, 3 Times Daily        potassium phosphate (monobasic) 500 MG tablet  Commonly known as: K-PHOS    500 mg, Per G Tube, 3 Times Daily        QUEtiapine 50 MG tablet  Commonly known as: SEROquel    50 mg, Per G Tube, Nightly        V-R HYDROCORTISONE 1 % cream  Generic drug: hydrocortisone    1 application, Topical, 3 Times Daily                  No Known Allergies     Discharge Disposition:  Home or Self Care     Diet:  Hospital:      Diet Order   Procedures   • Diet Soft Texture; Ground; Thin         Discharge Activity:      CODE STATUS:      Code Status and Medical Interventions:   Ordered at: 09/15/22 0728     Code Status (Patient has no pulse and is not breathing):     CPR (Attempt to Resuscitate)     Medical Interventions (Patient has pulse or is breathing):     Full Support     Release to patient:     Routine Release            No future appointments.     [unfilled]     Pertinent  and/or Most Recent Results      PROCEDURES:   CT Abdomen Pelvis Without Contrast     Result Date: 9/15/2022  Narrative: PROCEDURE:       CT ABDOMEN PELVIS WO CONTRAST  COMPARISON:  [facility], CT, CT CHEST W CONTRAST DIAGNOSTIC, 9/14/2022, 22:12.  INDICATIONS:       Abdominal pain, acute, nonlocalized  TECHNIQUE:      CT images were created without intravenous contrast.   PROTOCOL:             Standard imaging protocol performed             RADIATION:              DLP: 631mGy*cm       Automated exposure control was utilized to minimize radiation dose.  FINDINGS:            Small  left-sided pleural effusion is present.  Patchy airspace changes present within the left lung base.  The liver, pancreas and spleen demonstrate no acute process.  The pancreas appears atrophic.  The gallbladder appears unremarkable.  No evidence of biliary dilatation. The adrenal glands appear unremarkable.  The kidneys appear normal in size.  Nonobstructing calculi are present bilaterally.  Simple cyst present on the left.  No evidence of hydronephrosis.  No focal lesions identified.  No dilated  loops of bowel identified.  No evidence of obstruction.  No free air.  No mesenteric fluid collections identified.  The appendix is not well visualized.  No secondary findings of appendicitis.  Atherosclerotic calcifications are present..  Large stool burden present throughout the colon.  Diverticulosis is present.  No evidence of inflammatory change.  A large amount of stool is present within the rectum.  No suspicious adenopathy identified.  No significant free fluid.  The pelvic organs demonstrate no acute process.  No acute osseous abnormality is identified.        Impression:      1. No acute intra-abdominal or intrapelvic process. 2. Large stool burden present throughout the colon and the rectum consistent with constipation/obstipation. 3. Ancillary findings as described above.     YARIEL FIELD MD       Electronically Signed and Approved By: YARIEL FIELD MD on 9/15/2022 at 17:17              CT Head Without Contrast     Result Date: 9/15/2022  Narrative: PROCEDURE:       CT HEAD WO CONTRAST  COMPARISON:             [facility], CT, CT HEAD WO CONTRAST, 2/15/2022, 23:42.  [facility], CT, CT HEAD WO CONTRAST, 9/14/2022, 22:08. INDICATIONS:        Subarachnoid hemorrhage  PROTOCOL:    Standard imaging protocol performed             RADIATION:             DLP: 941mGy*cm       MA and/or KV was adjusted to minimize radiation dose.         TECHNIQUE:            After obtaining the patient's  consent, CT images were obtained without non-ionic intravenous contrast material.  FINDINGS:           Patient is again noted be status post large right-sided craniotomy.  There is a chronic low-density subdural fluid collection overlying the right frontal lobe measuring approximately 5 mm in thickness.  This is decreased in size from 2/15/2022 and unchanged from prior study of 9/14/2022.  There has been interval improvement in posttraumatic subarachnoid hemorrhage along the medial right frontal lobe.  There is no evidence of new or increasing hemorrhage.  There is moderate generalized parenchymal volume loss.  No intraventricular hemorrhage.  No evidence of acute infarct.  No new abnormal extra-axial fluid collection.  No mass effect or hydrocephalus.  Visualized paranasal sinuses and mastoid air cells are clear.  Globes and orbits are within normal limits.       Impression:      1. 1. Improving posttraumatic subarachnoid hemorrhage within the medial right frontal lobe.  No new or worsening intracranial hemorrhage identified. 2. No change in small low-density extra-axial fluid collection overlying the right frontal lobe.  Patient is again noted be status post large right hemispheric craniotomy.     RKISTY CIFUENTES MD       Electronically Signed and Approved By: KRISTY CIFUENTES MD on 9/15/2022 at 17:14              CT Head Without Contrast     Result Date: 9/14/2022  Narrative: PROCEDURE:       CT HEAD WO CONTRAST  COMPARISON:            2/15/2022.  INDICATIONS:          fall, head injury  PROTOCOL:            Standard imaging protocol performed        RADIATION:              DLP: 1018.2mGy*cm              MA and/or KV was adjusted to minimize radiation dose.              TECHNIQUE: After obtaining the patient's consent, 136 CT images were obtained without non-ionic intravenous contrast material.  DISCUSSION:             A routine nonenhanced head CT was performed.  Mild-to-moderate chronic small vessel  ischemia/infarction is suspected. There are arterial and basal ganglia calcifications. The extra-axial spaces and the ventricular system are prominent.  There has been Interval decrease in size of the chronic subdural hematoma along the right frontal cerebral convexity.  It now measures about 7 mm in greatest transverse extent.  Previously, it measured about 1.1 cm.  There is new minimal acute subarachnoid or intraparenchymal contusion involving the right frontal lobe, especially present inferiorly, such as seen on images 21 and 22 of series 201 and adjacent images.  There may also be new minimal acute subarachnoid hemorrhage, as seen on image 28 of series 201, in the more lateral right frontal region.  These findings suggest an acute contrecoup injury.  A large acute hemorrhagic scalp contusion is seen in the left parietal region, measuring approximately 10 x 1.2 cm, as on image 46 of series 201 and adjacent images.  No acute skull fracture is seen.  The patient has undergone anterior parasagittal craniotomy.  Scattered opacities are seen in the right mastoid air cell complex, present previously.  There may be mild age-indeterminate sinus disease.  No definite air-fluid interfaces are seen in the imaged paranasal sinuses.  There are degenerative changes of partially imaged cervical spine.  There are suspected extensive postoperative changes of the cervical spine, partially imaged on this exam.       Impression:      There is a small amount of acute intracranial hemorrhage, predominantly representing acute subarachnoid hemorrhage, in the right frontal lobe, likely related to an acute contrecoup injury.  A large hemorrhagic left parietal scalp contusion is seen.  No definite acute skull fracture is appreciated.  Probably no acute infarct.  No midline shift or acute intracranial herniation syndrome.  The chronic right-sided cerebral convexity subdural hematoma has decreased in size slightly since 2/15/2022.  Please  see above comments for further detail.    Pertinent findings were phoned to [facility] ED Physician, Dr. Magallon (who was covering for Dr. Flower, ordering/attending [facility] ED Physician), at approximately 2350 hours on 9/14/2022.  Please note that portions of this note were completed with a voice recognition program.  ORESTES MALCOLM JR, MD       Electronically Signed and Approved By: ORESTES MALCOLM JR, MD on 9/14/2022 at 23:54               CT Chest With Contrast Diagnostic     Result Date: 9/15/2022  Narrative: PROCEDURE:       CT CHEST W CONTRAST DIAGNOSTIC  COMPARISONS:       9/14/2022; 2/15/2022; 4/29/2021.  INDICATIONS:    abnormal chest x-ray  TECHNIQUE:     After obtaining the patient's consent, 701 CT images were obtained with non-ionic intravenous contrast material.   PROTOCOL:        Standard CT imaging protocol performed.               RADIATION:              Total DLP: 355 mGy*cm         Automated exposure control was utilized to minimize radiation dose. CONTRAST:     100 mL Isovue 370 I.V.  FINDINGS:  The patient's upper extremities in the scan field of view obscure detail.  There is a new small to moderate sized left pleural effusion.  It has CT numbers ranging between 3 and 20 Hounsfield units.  It is likely not hemorrhagic.  It does not appear to be loculated.  Minimal, if any, right-sided pleural effusion is seen.  There is mild to moderate cardiomegaly.  No significant pericardial effusion is seen.  There is slight chronic asymmetric elevation of the left diaphragm.  Atelectasis, infiltrate(s), and/or fibrosis is seen in the left lung, especially the lingula and the left lower lobe, increased in degree since the prior 4/29/2021 study.  The patchy ground-glass opacities within the right lung, especially the right lower lobe and right middle lobe, have resolved.  There may be mild atelectasis in the right lung.  The central tracheobronchial tree is well aerated without filling defect.  Peribronchial thickening  is seen, especially in the left lower lobe.  No pneumothorax.  No pneumomediastinum.  Extensive postoperative changes involve the cervical spine, partially imaged on this exam.  There are degenerative changes throughout the imaged spine.  Degenerative changes involve the shoulders.  No definite acute rib fracture is seen.  Probably no acute findings are seen within the partially imaged upper abdomen.  Benign left renal cysts are suspected.  One the largest involves the posterior upper pole of the left kidney, measuring about 2.7 cm in greatest diameter.  There is mild fusiform dilatation of the ascending aorta, which has a maximum diameter of about 4.7 cm.  Previously, it measured about 4.7 cm.  No thoracic aortic dissection is seen.  Consider close interval clinical and imaging follow-up of these findings.  No aneurysmal dilatation of the descending aorta.  No definite pulmonary embolism is seen.  The contrast bolus within the pulmonary arteries is somewhat limited.  The spleen is not clearly identified.  It may be surgically absent.  A percutaneous gastrostomy tube is partially seen on the study.  There is a chronic T12 vertebral compression fracture, estimated at 30 percent or less in degree, with minimal if any posterior wall retropulsion.  This finding was seen previously on the 4/29/2021 study.  There is a superior sternal fracture, which is new since 4/29/2021.  It is thought most likely to be subacute to chronic in nature.  It is nondisplaced.  There is periosteal reaction, callus formation, and sclerosis about the center of the expected fracture line.  No other definite acute fractures are suspected.  There are small scattered mediastinal lymph nodes.  Probably no enlarged mediastinal lymph nodes are seen.  Coronary artery calcifications are present.  There is probably bilateral gynecomastia.  No enlargement of the thyroid gland.  Probably no enlarged axillary lymph nodes are seen.       Impression:      1.  There is a new left-sided pleural effusion, small to moderate size.  Minimal, if any, right pleural effusion is seen.  2. Atelectasis, infiltrate(s), and/or fibrosis is seen in the left lung base, increased since the prior study.  Pneumonia cannot be excluded, including aspiration pneumonia.  3. Interval resolution of the somewhat ground-glass opacities in the right lung base since 4/29/2021.  4. There is mild to moderate cardiomegaly.  5. 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  No thoracic aortic dissection.  No descending aortic aneurysm.  No acute mediastinal hematoma.  No acute hemothorax is suspected.  6. There are postoperative changes cervical spine.  7. There is a new chronic nondisplaced superior sternal fracture.  8. There is a percutaneous gastrostomy tube in place.  It is partially imaged on the study.  9. 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  10. No pneumothorax is identified.  No pneumomediastinum. 11. Please see above comments for further detail.   1.   Please note that portions of this note were completed with a voice recognition program.  ORESTES MALCOLM JR, MD       Electronically Signed and Approved By: ORESTES MALCOLM JR, MD on 9/15/2022 at 0:11               XR Chest 1 View     Result Date: 9/14/2022  Narrative: PROCEDURE:       XR CHEST 1 VW  COMPARISON:    [facility], , XR CHEST 1 VW, 2/15/2022, 21:10.  INDICATIONS:      Weak/Dizzy/AMS triage protocol, fall  FINDINGS:          Single frontal view chest is compared previous study performed on 2/15/2022.  Today's study reveals the patient is status post surgical fusion of multiple levels in the inferior cervical spine with metallic plate and screws and bilateral pedicular screws and bilateral posterior metallic vertical rods.  There is a moderate size area of infiltrate and effusion in the left lower lobe lung appears to have gotten worse since previous study.  No acute or focal  abnormality seen the right lung.       Impression:      1. Moderate size infiltrate and effusion appears to have gotten worse in the left lower lobe lung since previous study performed on 2/15/2022 CT of the chest might be helpful to further evaluate this abnormality to rule out the possibility of occult mass       SHANNA TALBOT MD       Electronically Signed and Approved By: SHANNA TALBOT MD on 9/14/2022 at 20:54                    LAB RESULTS:            Lab 09/16/22  0310 09/15/22  0841 09/14/22 2028   WBC 13.55* 13.34* 13.16*   HEMOGLOBIN 14.2 13.1 13.8   HEMATOCRIT 42.6 39.2 41.0   PLATELETS 298 344 356   NEUTROS ABS 9.34* 9.80* 8.80*   IMMATURE GRANS (ABS) 0.03 0.05 0.14*   LYMPHS ABS 2.37 1.92 2.19   MONOS ABS 1.67* 1.40* 1.61*   EOS ABS 0.10 0.13 0.39   MCV 93.8 93.1 92.3   LACTATE 1.0  --   --                 Lab 09/16/22  0310 09/15/22  0620 09/14/22 2028   SODIUM 133* 134* 138   POTASSIUM 4.2 4.2 4.4   CHLORIDE 97* 97* 98   CO2 25.6 26.5 32.1*   ANION GAP 10.4 10.5 7.9   BUN 14 16 18   CREATININE 0.74* 0.68* 0.75*   EGFR 92.2 94.6 91.8   GLUCOSE 109* 123* 122*   CALCIUM 9.9 9.3 10.0   MAGNESIUM 2.0 2.1 2.2   HEMOGLOBIN A1C  --  6.50*  --                 Lab 09/16/22  0310 09/15/22  0620 09/14/22 2028   TOTAL PROTEIN 7.5 6.9 7.4   ALBUMIN 3.90 3.90 4.20   GLOBULIN 3.6 3.0 3.2   ALT (SGPT) 25 22 24   AST (SGOT) 19 17 19   BILIRUBIN 0.7 0.5 0.2   ALK PHOS 180* 163* 168*   LIPASE  --  17  --                Lab 09/15/22  0620 09/14/22 2028   PROBNP 296.7  --    TROPONIN T  --  0.024              Lab 09/15/22  0620   CHOLESTEROL 122   LDL CHOL 59   HDL CHOL 49   TRIGLYCERIDES 70                  Brief Urine Lab Results      None              Microbiology Results (last 10 days)      ** No results found for the last 240 hours. **               Last 30 day radiology impressions    CT Abdomen Pelvis Without Contrast     Result Date: 9/15/2022  Impression:      1. No acute intra-abdominal or  intrapelvic process. 2. Large stool burden present throughout the colon and the rectum consistent with constipation/obstipation. 3. Ancillary findings as described above.     YARIEL FIELD MD       Electronically Signed and Approved By: YARIEL FIELD MD on 9/15/2022 at 17:17              CT Head Without Contrast     Result Date: 9/15/2022  Impression:      1. 1. Improving posttraumatic subarachnoid hemorrhage within the medial right frontal lobe.  No new or worsening intracranial hemorrhage identified. 2. No change in small low-density extra-axial fluid collection overlying the right frontal lobe.  Patient is again noted be status post large right hemispheric craniotomy.     KRISTY CIFUENTES MD       Electronically Signed and Approved By: KRISTY CIFUENTES MD on 9/15/2022 at 17:14              CT Head Without Contrast     Result Date: 9/14/2022  Impression:      There is a small amount of acute intracranial hemorrhage, predominantly representing acute subarachnoid hemorrhage, in the right frontal lobe, likely related to an acute contrecoup injury.  A large hemorrhagic left parietal scalp contusion is seen.  No definite acute skull fracture is appreciated.  Probably no acute infarct.  No midline shift or acute intracranial herniation syndrome.  The chronic right-sided cerebral convexity subdural hematoma has decreased in size slightly since 2/15/2022.  Please see above comments for further detail.    Pertinent findings were phoned to [facility] ED Physician, Dr. Magallon (who was covering for Dr. Flower, ordering/attending [facility] ED Physician), at approximately 2350 hours on 9/14/2022.  Please note that portions of this note were completed with a voice recognition program.  ORESTES MALCOLM JR, MD       Electronically Signed and Approved By: ORESTES MALCOLM JR, MD on 9/14/2022 at 23:54               CT Chest With Contrast Diagnostic     Result Date: 9/15/2022  Impression:      1. There is a new left-sided pleural effusion, small to  moderate size.  Minimal, if any, right pleural effusion is seen.  2. Atelectasis, infiltrate(s), and/or fibrosis is seen in the left lung base, increased since the prior study.  Pneumonia cannot be excluded, including aspiration pneumonia.  3. Interval resolution of the somewhat ground-glass opacities in the right lung base since 4/29/2021.  4. There is mild to moderate cardiomegaly.  5. 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  No thoracic aortic dissection.  No descending aortic aneurysm.  No acute mediastinal hematoma.  No acute hemothorax is suspected.  6. There are postoperative changes cervical spine.  7. There is a new chronic nondisplaced superior sternal fracture.  8. There is a percutaneous gastrostomy tube in place.  It is partially imaged on the study.  9. 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  10. No pneumothorax is identified.  No pneumomediastinum. 11. Please see above comments for further detail.   1.   Please note that portions of this note were completed with a voice recognition program.  ORESTES MALCOLM JR, MD       Electronically Signed and Approved By: ORESTES MALCOLM JR, MD on 9/15/2022 at 0:11               XR Chest 1 View     Result Date: 9/14/2022  Impression:      1. Moderate size infiltrate and effusion appears to have gotten worse in the left lower lobe lung since previous study performed on 2/15/2022 CT of the chest might be helpful to further evaluate this abnormality to rule out the possibility of occult mass       SHANNA TALBOT MD       Electronically Signed and Approved By: SHANNA TALBOT MD on 9/14/2022 at 20:54                           Labs Pending at Discharge:        Time spent on Discharge including face to face service:  32 minutes     Electronically signed by Job Peralta DO, 09/16/22, 3:51 PM EDT

## 2022-09-16 NOTE — PLAN OF CARE
Goal Outcome Evaluation:    Pt able to transfer off ICU this morning d/t repeat head CT's showing improvement in ICH, and to pt's overall condition improving. VSS.  Pt did get OOB once to use bedside commode--was a full assist x2 and still had a significant amount of difficulty. He also, as soon as he was upright and standing, was overcome with severe pain in his head. He reports that sitting upright, standing & ambulating/transferring severely aggravate or bring on his head aches--w/ no other precipitating factors noted.    Pt transferred at approx 09:30 this morning to [facility], and spouse notified.

## 2022-09-16 NOTE — CONSULTS
"Nutrition Services    Patient Name: Wood Desai  YOB: 1940  MRN: [id number]  Admission date: 9/14/2022      CLINICAL NUTRITION ASSESSMENT      Reason for Assessment  Physician consult, EN     H&P:    Past Medical History:   Diagnosis Date   • Acute cystitis 04/17/2018   • BPH (benign prostatic hyperplasia)    • Brain bleed (HCC)    • Broken ankle    • Broken neck (HCC)    • Coronary artery disease involving native heart without angina pectoris 01/03/2022   • Gout    • High blood pressure    • Myocardial infarction (HCC)         Current Problems:   Active Hospital Problems    Diagnosis    • SAH (subarachnoid hemorrhage) (HCC)         Nutrition/Diet History         Narrative     Patient admitted after a fall.  Subarachnoid hemorrhage.  Abdominal pain. Patient evaluated by speech therapy and plan to start mechanical soft diet.  Discussed with patient and wife at bedside.  Patient has a PEG tube which they use for supplemental nutrition support at nighttime.  Will order according to patient's home regimen.  Patient uses Jevity 1.5 at home and wife is agreeable to formulary equivalent while admitted. Tolerating well. Plan for pt to move out of units today.       Anthropometrics        Current Height, Weight Height: 182.9 cm (72\")  Weight: 75.2 kg (165 lb 12.6 oz)   Current BMI Body mass index is 22.48 kg/m².       Weight Hx  Wt Readings from Last 30 Encounters:   09/15/22 0300 75.2 kg (165 lb 12.6 oz)   09/14/22 1950 77.4 kg (170 lb 10.2 oz)   02/16/22 1114 68 kg (149 lb 14.6 oz)   02/16/22 0500 68.1 kg (150 lb 2.1 oz)   02/15/22 2045 73.3 kg (161 lb 9.6 oz)   04/26/21 0000 81.6 kg (180 lb)   12/28/20 0000 83 kg (183 lb)   04/17/19 0000 85.3 kg (188 lb)   04/15/19 0000 85.3 kg (188 lb)   10/12/18 0000 77.1 kg (170 lb)   07/11/18 0000 76.2 kg (168 lb)   05/25/18 0000 76.2 kg (168 lb)   05/02/18 0000 77.1 kg (170 lb)   04/13/18 0000 77.1 kg (170 lb)   04/05/18 0000 76.4 kg (168 lb 8 oz)   01/19/18 " 0000 80.5 kg (177 lb 8 oz)            Wt Change Observation stable     Estimated/Assessed Needs       Energy Requirements    EST Needs (kcal/day) 2256       Protein Requirements    EST Daily Needs (g/day) 75-90       Fluid Requirements     Estimated Needs (mL/day) 1341-4276     Labs/Medications         Pertinent Labs Reviewed.   Results from last 7 days   Lab Units 09/16/22  0310 09/15/22  0620 09/14/22  2028   SODIUM mmol/L 133* 134* 138   POTASSIUM mmol/L 4.2 4.2 4.4   CHLORIDE mmol/L 97* 97* 98   CO2 mmol/L 25.6 26.5 32.1*   BUN mg/dL 14 16 18   CREATININE mg/dL 0.74* 0.68* 0.75*   CALCIUM mg/dL 9.9 9.3 10.0   BILIRUBIN mg/dL 0.7 0.5 0.2   ALK PHOS U/L 180* 163* 168*   ALT (SGPT) U/L 25 22 24   AST (SGOT) U/L 19 17 19   GLUCOSE mg/dL 109* 123* 122*     Results from last 7 days   Lab Units 09/16/22  0310 09/15/22  0841 09/15/22  0620 09/14/22  2028   MAGNESIUM mg/dL 2.0  --  2.1 2.2   HEMOGLOBIN g/dL 14.2   < >  --  13.8   HEMATOCRIT % 42.6   < >  --  41.0   TRIGLYCERIDES mg/dL  --   --  70  --     < > = values in this interval not displayed.     No results found for: COVID19  Lab Results   Component Value Date    HGBA1C 6.50 (H) 09/15/2022         Pertinent Medications Reviewed.     Current Nutrition Orders & Evaluation of Intake       Oral Nutrition     Current PO Diet Diet Soft Texture; Ground; Thin   Supplement Orders Placed This Encounter      Diet, Tube Feeding Tube Feeding Formula: Isosource 1.5; Tube Feeding Type: Cyclic / Intermittent; Feeding Start Time: 8:00 PM; Feeding End Time: 8:00 AM; Cyclic Feeding Start Rate (mL/hr): 75; To Goal Rate of (mL/hr): 75; Supplemental Bolus Feedin...       Malnutrition Severity Assessment                Nutrition Diagnosis         Nutrition Dx Problem 1 Inadequate energy Intake related to decreased ability to consume sufficient energy as evidenced by Supplemental nutrition support       Nutrition Intervention         Isosource 1.5@75 mL/hour x12 hours  Free water  flushes 40 mL an hour x12 hours  Provides 1350 kcal, 61 g of protein, 1118 mL fluid    Provides about 60% of estimated kcal needs, 70% of estimated protein needs, 55% estimated fluid needs.     Medical Nutrition Therapy/Nutrition Education          Learner     Readiness Patient and Family  Acceptance     Method     Response Explanation  Verbalizes understanding     Monitor/Evaluation        Monitor Per protocol, I&O, PO intake, Pertinent labs, EN delivery/tolerance, Weight, GI status       Nutrition Discharge Plan         To be determined       Electronically signed by:  Neela Sharma RD  09/16/22 09:33 EDT

## 2022-09-16 NOTE — PLAN OF CARE
Goal Outcome Evaluation:  Plan of Care Reviewed With: patient        Progress: improving  Outcome Evaluation: Patient A/O x4. Transferred from ICU this morning. PRN Tramadol administered for pain. No other issues/needs at this time. Discharging home.

## 2022-09-16 NOTE — PROGRESS NOTES
[facility]   Neurosurgery Progress Note    Patient Name: Wood Desai  : 1940  MRN: [id number]  Date of admission: 2022  Surgical Procedures Since Admission:    Subjective   Subjective     Chief Complaint: Headache    History of Present Illness   Patient reports some headache.  He had his repeat CT scan yesterday approximately 5 PM.  This looked improved.  He is awaiting transfer for to the regular floor.      Objective   Objective     Vitals:   Temp:  [97.7 °F (36.5 °C)-99.3 °F (37.4 °C)] 97.9 °F (36.6 °C)  Heart Rate:  [63-84] 72  Resp:  [14-21] 20  BP: (134-168)/() 154/93  Output by Drain (mL) 09/15/22 0701 - 09/15/22 1900 09/15/22 1901 - 22 0700 22 0701 - 22 0924 Range Total   Requested LDAs do not have output data documented.     Awake and alert.  No notable change in his neurologic status.     Result Review    Result Review:  I have personally reviewed the results from the time of this admission to 2022 09:24 EDT and agree with these findings:  []  Laboratory  []  Microbiology  [x]  Radiology  []  EKG/Telemetry   []  Cardiology/Vascular   []  Pathology  []  Old records  []  Other:  Most notable findings include: Improved appearance of his head CT with less subarachnoid hemorrhage.    Assessment & Plan   Assessment / Plan     Brief Patient Summary:  Wood Desai is a 81 y.o. male who had a fall with traumatic subarachnoid hemorrhage.    Active Hospital Problems:  Active Hospital Problems    Diagnosis    • SAH (subarachnoid hemorrhage) ([facility])      Plan:   From my standpoint he can be discharged home once felt medically stable.  He does not need additional neurosurgical follow-up.  I would only recommend repeat imaging with new symptoms.

## 2022-09-17 NOTE — OUTREACH NOTE
Prep Survey    Flowsheet Row Responses   [facility] patient discharged from? [facility]   Is LACE score < 7 ? No   Emergency Room discharge w/ pulse ox? No   Eligibility Readm Mgmt   Discharge diagnosis SAH   Does the patient have one of the following disease processes/diagnoses(primary or secondary)? Stroke   Does the patient have Home health ordered? Yes   What [facility] [place]   Is there a DME ordered? No   Prep survey completed? Yes          ASHLEIGH MEDINA - Registered Nurse

## 2022-09-21 ENCOUNTER — READMISSION MANAGEMENT (OUTPATIENT)
Dept: CALL CENTER | Facility: HOSPITAL | Age: 82
End: 2022-09-21

## 2022-09-21 NOTE — OUTREACH NOTE
Stroke Week 1 Survey    Flowsheet Row Responses   [facility] patient discharged from? [facility]   Does the patient have one of the following disease processes/diagnoses(primary or secondary)? Stroke   Week 1 attempt successful? Yes   Call start time 1255   Call end time 1256   Discharge diagnosis SAH   Person spoke with today (if not patient) and relationship Wife   Meds reviewed with patient/caregiver? Yes   Is the patient having any side effects they believe may be caused by any medication additions or changes? No   Does the patient have all medications ordered at discharge? Yes   Is the patient taking all medications as directed (includes completed medication regime)? Yes   Does the patient have a primary care provider?  Yes   Does the patient have an appointment with their PCP within 7 days of discharge? Yes   Has the patient kept scheduled appointments due by today? N/A   [facility]   Has home health visited the patient within 72 hours of discharge? Yes   Psychosocial issues? No   Does the patient require any assistance with activities of daily living such as eating, bathing, dressing, walking, etc.? Yes   Does the patient have any residual symptoms from stroke/TIA? No   Did the patient receive a copy of their discharge instructions? Yes   Nursing interventions Reviewed instructions with patient   What is the patient's perception of their health status since discharge? Improving   If the patient is a current smoker, are they able to teach back resources for cessation? Not a smoker   Is the patient/caregiver able to teach back the hierarchy of who to call/visit for symptoms/problems? PCP, Specialist, Home health nurse, Urgent Care, ED, 911 Yes   Week 1 call completed? Yes          CHAVEZ BERUMEN - Registered Nurse

## 2022-09-22 LAB — QT INTERVAL: 428 MS

## 2022-09-26 ENCOUNTER — HOSPITAL ENCOUNTER (EMERGENCY)
Facility: HOSPITAL | Age: 82
Discharge: HOME | End: 2022-09-26
Attending: EMERGENCY MEDICINE | Admitting: EMERGENCY MEDICINE

## 2022-09-26 VITALS
HEIGHT: 72 IN | RESPIRATION RATE: 24 BRPM | BODY MASS INDEX: 22.48 KG/M2 | TEMPERATURE: 98 F | OXYGEN SATURATION: 99 % | SYSTOLIC BLOOD PRESSURE: 145 MMHG | HEART RATE: 106 BPM | DIASTOLIC BLOOD PRESSURE: 71 MMHG

## 2022-09-26 DIAGNOSIS — K94.20 COMPLICATION OF FEEDING TUBE: Primary | ICD-10-CM

## 2022-09-26 PROCEDURE — 99282 EMERGENCY DEPT VISIT SF MDM: CPT

## 2022-10-09 ENCOUNTER — HOSPITAL ENCOUNTER (INPATIENT)
Facility: HOSPITAL | Age: 82
LOS: 3 days | Discharge: NURSING, REHAB OR LONG-TERM CARE | End: 2022-10-13
Attending: EMERGENCY MEDICINE | Admitting: STUDENT IN AN ORGANIZED HEALTH CARE EDUCATION/TRAINING PROGRAM

## 2022-10-09 ENCOUNTER — APPOINTMENT (OUTPATIENT)
Dept: CT IMAGING | Facility: HOSPITAL | Age: 82
End: 2022-10-09

## 2022-10-09 ENCOUNTER — APPOINTMENT (OUTPATIENT)
Dept: GENERAL RADIOLOGY | Facility: HOSPITAL | Age: 82
End: 2022-10-09

## 2022-10-09 DIAGNOSIS — N40.1 BENIGN PROSTATIC HYPERPLASIA WITH URINARY FREQUENCY: ICD-10-CM

## 2022-10-09 DIAGNOSIS — Z78.9 DECREASED ACTIVITIES OF DAILY LIVING (ADL): ICD-10-CM

## 2022-10-09 DIAGNOSIS — R35.0 BENIGN PROSTATIC HYPERPLASIA WITH URINARY FREQUENCY: ICD-10-CM

## 2022-10-09 DIAGNOSIS — R26.2 DIFFICULTY WALKING: ICD-10-CM

## 2022-10-09 DIAGNOSIS — R13.12 OROPHARYNGEAL DYSPHAGIA: ICD-10-CM

## 2022-10-09 DIAGNOSIS — R53.1 WEAKNESS: Primary | ICD-10-CM

## 2022-10-09 LAB
ALBUMIN SERPL-MCNC: 4 G/DL (ref 3.5–5.2)
ALBUMIN/GLOB SERPL: 1.2 G/DL
ALP SERPL-CCNC: 200 U/L (ref 39–117)
ALT SERPL W P-5'-P-CCNC: 28 U/L (ref 1–41)
ANION GAP SERPL CALCULATED.3IONS-SCNC: 11 MMOL/L (ref 5–15)
ANISOCYTOSIS BLD QL: NORMAL
ARTERIAL PATENCY WRIST A: POSITIVE
AST SERPL-CCNC: 21 U/L (ref 1–40)
BASE EXCESS BLDA CALC-SCNC: 3.9 MMOL/L (ref -2–2)
BASOPHILS # BLD AUTO: 0.03 10*3/MM3 (ref 0–0.2)
BASOPHILS NFR BLD AUTO: 0.3 % (ref 0–1.5)
BDY SITE: ABNORMAL
BILIRUB SERPL-MCNC: 0.3 MG/DL (ref 0–1.2)
BILIRUB UR QL STRIP: NEGATIVE
BUN SERPL-MCNC: 19 MG/DL (ref 8–23)
BUN/CREAT SERPL: 25.7 (ref 7–25)
CA-I BLDA-SCNC: 1.22 MMOL/L (ref 1.13–1.32)
CALCIUM SPEC-SCNC: 9.8 MG/DL (ref 8.6–10.5)
CHLORIDE BLDA-SCNC: 102 MMOL/L (ref 98–106)
CHLORIDE SERPL-SCNC: 101 MMOL/L (ref 98–107)
CLARITY UR: CLEAR
CO2 SERPL-SCNC: 26 MMOL/L (ref 22–29)
COHGB MFR BLD: 1.1 % (ref 0–1.5)
COLOR UR: YELLOW
CREAT SERPL-MCNC: 0.74 MG/DL (ref 0.76–1.27)
D-LACTATE SERPL-SCNC: 1.6 MMOL/L (ref 0.5–2)
DEPRECATED RDW RBC AUTO: 57.1 FL (ref 37–54)
EGFRCR SERPLBLD CKD-EPI 2021: 91.6 ML/MIN/1.73
EOSINOPHIL # BLD AUTO: 0.19 10*3/MM3 (ref 0–0.4)
EOSINOPHIL NFR BLD AUTO: 1.6 % (ref 0.3–6.2)
ERYTHROCYTE [DISTWIDTH] IN BLOOD BY AUTOMATED COUNT: 16.2 % (ref 12.3–15.4)
FHHB: 6.3 % (ref 0–5)
GLOBULIN UR ELPH-MCNC: 3.4 GM/DL
GLUCOSE BLDA-MCNC: 114 MMOL/L (ref 70–99)
GLUCOSE SERPL-MCNC: 96 MG/DL (ref 65–99)
GLUCOSE UR STRIP-MCNC: NEGATIVE MG/DL
HCO3 BLDA-SCNC: 28.3 MMOL/L (ref 22–26)
HCT VFR BLD AUTO: 43.9 % (ref 37.5–51)
HGB BLD-MCNC: 14.6 G/DL (ref 13–17.7)
HGB BLDA-MCNC: 14 G/DL (ref 13.8–16.4)
HGB UR QL STRIP.AUTO: NEGATIVE
HOLD SPECIMEN: NORMAL
HOLD SPECIMEN: NORMAL
IMM GRANULOCYTES # BLD AUTO: 0.03 10*3/MM3 (ref 0–0.05)
IMM GRANULOCYTES NFR BLD AUTO: 0.3 % (ref 0–0.5)
INHALED O2 CONCENTRATION: 21 %
KETONES UR QL STRIP: NEGATIVE
LACTATE BLDA-SCNC: 1.19 MMOL/L (ref 0.5–2)
LARGE PLATELETS: NORMAL
LEUKOCYTE ESTERASE UR QL STRIP.AUTO: NEGATIVE
LYMPHOCYTES # BLD AUTO: 2.84 10*3/MM3 (ref 0.7–3.1)
LYMPHOCYTES NFR BLD AUTO: 24.6 % (ref 19.6–45.3)
MACROCYTES BLD QL SMEAR: NORMAL
MAGNESIUM SERPL-MCNC: 2.1 MG/DL (ref 1.6–2.4)
MCH RBC QN AUTO: 31.7 PG (ref 26.6–33)
MCHC RBC AUTO-ENTMCNC: 33.3 G/DL (ref 31.5–35.7)
MCV RBC AUTO: 95.4 FL (ref 79–97)
METHGB BLD QL: 0.3 % (ref 0–1.5)
MODALITY: ABNORMAL
MONOCYTES # BLD AUTO: 1.43 10*3/MM3 (ref 0.1–0.9)
MONOCYTES NFR BLD AUTO: 12.4 % (ref 5–12)
NEUTROPHILS NFR BLD AUTO: 60.8 % (ref 42.7–76)
NEUTROPHILS NFR BLD AUTO: 7.02 10*3/MM3 (ref 1.7–7)
NITRITE UR QL STRIP: NEGATIVE
NOTE: ABNORMAL
NRBC BLD AUTO-RTO: 0 /100 WBC (ref 0–0.2)
OXYHGB MFR BLDV: 92.3 % (ref 94–99)
PCO2 BLDA: 42.2 MM HG (ref 35–45)
PH BLDA: 7.45 PH UNITS (ref 7.35–7.45)
PH UR STRIP.AUTO: 7.5 [PH] (ref 5–8)
PLATELET # BLD AUTO: 382 10*3/MM3 (ref 140–450)
PMV BLD AUTO: 11.5 FL (ref 6–12)
PO2 BLD: 319 MM[HG] (ref 0–500)
PO2 BLDA: 66.9 MM HG (ref 80–100)
POTASSIUM BLDA-SCNC: 4.15 MMOL/L (ref 3.5–5)
POTASSIUM SERPL-SCNC: 4.6 MMOL/L (ref 3.5–5.2)
PROT SERPL-MCNC: 7.4 G/DL (ref 6–8.5)
PROT UR QL STRIP: NEGATIVE
RBC # BLD AUTO: 4.6 10*6/MM3 (ref 4.14–5.8)
SAO2 % BLDCOA: 93.6 % (ref 95–99)
SMALL PLATELETS BLD QL SMEAR: ADEQUATE
SODIUM BLDA-SCNC: 137.5 MMOL/L (ref 136–146)
SODIUM SERPL-SCNC: 138 MMOL/L (ref 136–145)
SP GR UR STRIP: 1.02 (ref 1–1.03)
TARGETS BLD QL SMEAR: NORMAL
TROPONIN T SERPL-MCNC: 0.03 NG/ML (ref 0–0.03)
UROBILINOGEN UR QL STRIP: NORMAL
WBC MORPH BLD: NORMAL
WBC NRBC COR # BLD: 11.54 10*3/MM3 (ref 3.4–10.8)
WHOLE BLOOD HOLD COAG: NORMAL
WHOLE BLOOD HOLD SPECIMEN: NORMAL

## 2022-10-09 PROCEDURE — 85025 COMPLETE CBC W/AUTO DIFF WBC: CPT

## 2022-10-09 PROCEDURE — 93010 ELECTROCARDIOGRAM REPORT: CPT | Performed by: INTERNAL MEDICINE

## 2022-10-09 PROCEDURE — 83735 ASSAY OF MAGNESIUM: CPT

## 2022-10-09 PROCEDURE — 83735 ASSAY OF MAGNESIUM: CPT | Performed by: STUDENT IN AN ORGANIZED HEALTH CARE EDUCATION/TRAINING PROGRAM

## 2022-10-09 PROCEDURE — 82805 BLOOD GASES W/O2 SATURATION: CPT | Performed by: EMERGENCY MEDICINE

## 2022-10-09 PROCEDURE — 85007 BL SMEAR W/DIFF WBC COUNT: CPT

## 2022-10-09 PROCEDURE — 84484 ASSAY OF TROPONIN QUANT: CPT

## 2022-10-09 PROCEDURE — 82375 ASSAY CARBOXYHB QUANT: CPT | Performed by: EMERGENCY MEDICINE

## 2022-10-09 PROCEDURE — 81003 URINALYSIS AUTO W/O SCOPE: CPT | Performed by: EMERGENCY MEDICINE

## 2022-10-09 PROCEDURE — 83050 HGB METHEMOGLOBIN QUAN: CPT | Performed by: EMERGENCY MEDICINE

## 2022-10-09 PROCEDURE — 80053 COMPREHEN METABOLIC PANEL: CPT | Performed by: EMERGENCY MEDICINE

## 2022-10-09 PROCEDURE — 84100 ASSAY OF PHOSPHORUS: CPT | Performed by: STUDENT IN AN ORGANIZED HEALTH CARE EDUCATION/TRAINING PROGRAM

## 2022-10-09 PROCEDURE — 36600 WITHDRAWAL OF ARTERIAL BLOOD: CPT | Performed by: EMERGENCY MEDICINE

## 2022-10-09 PROCEDURE — 99285 EMERGENCY DEPT VISIT HI MDM: CPT

## 2022-10-09 PROCEDURE — 70450 CT HEAD/BRAIN W/O DYE: CPT

## 2022-10-09 PROCEDURE — 83605 ASSAY OF LACTIC ACID: CPT | Performed by: EMERGENCY MEDICINE

## 2022-10-09 PROCEDURE — 93005 ELECTROCARDIOGRAM TRACING: CPT

## 2022-10-09 PROCEDURE — 71045 X-RAY EXAM CHEST 1 VIEW: CPT

## 2022-10-09 PROCEDURE — 93005 ELECTROCARDIOGRAM TRACING: CPT | Performed by: EMERGENCY MEDICINE

## 2022-10-09 RX ORDER — PRIMIDONE 250 MG/1
250 TABLET ORAL 3 TIMES DAILY
COMMUNITY

## 2022-10-09 RX ORDER — SODIUM CHLORIDE 0.9 % (FLUSH) 0.9 %
10 SYRINGE (ML) INJECTION AS NEEDED
Status: DISCONTINUED | OUTPATIENT
Start: 2022-10-09 | End: 2022-10-14 | Stop reason: HOSPADM

## 2022-10-09 RX ADMIN — SODIUM CHLORIDE 1000 ML: 9 INJECTION, SOLUTION INTRAVENOUS at 19:29

## 2022-10-09 NOTE — ED PROVIDER NOTES
Time: 6:40 PM EDT  Arrived by: ambulance  Chief Complaint: Fatigue  History provided by: patient and wife  History is limited by: N/A     History of Present Illness:  Patient is a 82 y.o. year old male who presents to the emergency department with fatigue. Pt reports being lethargic for 3 days. Wife reports slurred speech and disorientation. Pt denies vomiting and diarrhea. Pt is eating. Pt denies chest pain. Pt denies dysuria and urinary frequency.       Fatigue  Timing:  Constant  Progression:  Worsening  Chronicity:  Recurrent  Associated symptoms: fatigue    Associated symptoms: no abdominal pain, no chest pain, no congestion, no cough, no diarrhea, no fever, no headaches, no myalgias, no nausea, no rhinorrhea, no shortness of breath, no sore throat and no vomiting        Similar Symptoms Previously: no  Recently seen: no      Patient Care Team  Primary Care Provider: Lea Fallon MD    Past Medical History:     No Known Allergies  Past Medical History:   Diagnosis Date   • Acute cystitis 04/17/2018   • BPH (benign prostatic hyperplasia)    • Brain bleed (HCC)    • Broken ankle    • Broken neck (HCC)    • Coronary artery disease involving native heart without angina pectoris 01/03/2022   • Gout    • High blood pressure    • Myocardial infarction (HCC)      Past Surgical History:   Procedure Laterality Date   • COLON SURGERY     • CRANIOTOMY     • CYSTOSCOPY     • EXTERNAL EAR SURGERY     • HERNIA REPAIR     • NECK SURGERY     • NOSE SURGERY     • SHOULDER SURGERY     • SKIN CANCER EXCISION     • SPLENECTOMY       Family History   Problem Relation Age of Onset   • Ulcers Mother    • Lung cancer Father        Home Medications:  Prior to Admission medications    Medication Sig Start Date End Date Taking? Authorizing Provider   aspirin 81 MG chewable tablet Administer 1 tablet per J tube Daily. 2/24/22   Hema Saldana MD   docusate sodium (COLACE) 50 mg/5 mL liquid Administer 100 mg per G tube 2 (Two)  Times a Day.    Su Ambriz MD   finasteride (PROSCAR) 5 MG tablet Administer 1 tablet per G tube Daily. 5/23/22   Arvind Umana MD   gabapentin (NEURONTIN) 50 mg/mL solution Administer 300 mg per G tube 3 (Three) Times a Day As Needed.    Su Ambriz MD   hydrocortisone (V-R HYDROCORTISONE) 1 % cream Apply 1 application topically to the appropriate area as directed 3 (Three) Times a Day.    Su Ambriz MD   lactulose (CHRONULAC) 10 GM/15ML solution Administer 30 mL per G tube As Needed (constipation). 2/24/22   Hema Saldana MD   lansoprazole 3 mg/mL in sodium bicarbonate injection 8.4% Administer 5 mL per G tube Daily. 2/24/22   Hema Saldana MD   levETIRAcetam (KEPPRA) 1000 MG tablet Administer 1 tablet per G tube 2 (Two) Times a Day. 2/24/22   Hema Saldana MD   levothyroxine (SYNTHROID, LEVOTHROID) 50 MCG tablet Administer 1 tablet per G tube Daily. 2/24/22   Hema Saldana MD   melatonin 5 MG tablet tablet Administer 1 tablet per G tube Every Night. 2/24/22   Hema Sadlana MD   metoprolol tartrate 75 MG tablet Administer 75 mg per G tube Every 12 (Twelve) Hours. 2/24/22   Hema Saldana MD   mupirocin (BACTROBAN) 2 % cream Apply 1 application topically to the appropriate area as directed 3 (Three) Times a Day.    Su Ambriz MD   Nutritional Supplements (jevity 1.5 edward) liquid Continuous infusion 2/24/22   Hema Saldana MD   nystatin (MYCOSTATIN) 425156 UNIT/GM cream Apply 1 application topically to the appropriate area as directed 3 (Three) Times a Day.    Su Ambriz MD   potassium phosphate, monobasic, (K-PHOS) 500 MG tablet Administer 1 tablet per G tube 3 (Three) Times a Day. 2/24/22   Hema Saldana MD   primidone (MYSOLINE) 250 MG tablet Administer 3 tablets per G tube Every Night.  Patient taking differently: Administer 250 mg per G tube 3 (Three) Times a Day. 2/24/22    "Hema Saldana MD   QUEtiapine (SEROquel) 50 MG tablet Administer 1 tablet per G tube Every Night. 2/24/22   Hema Saldana MD        Social History:   Social History     Tobacco Use   • Smoking status: Never   • Smokeless tobacco: Never   Substance Use Topics   • Alcohol use: Never     Recent travel: not applicable     Review of Systems:  Review of Systems   Constitutional: Positive for fatigue. Negative for chills and fever.   HENT: Negative for congestion, rhinorrhea and sore throat.    Eyes: Negative for pain and visual disturbance.   Respiratory: Negative for apnea, cough, chest tightness and shortness of breath.    Cardiovascular: Negative for chest pain and palpitations.   Gastrointestinal: Negative for abdominal pain, diarrhea, nausea and vomiting.   Genitourinary: Negative for difficulty urinating and dysuria.   Musculoskeletal: Negative for joint swelling and myalgias.   Skin: Negative for color change.   Neurological: Positive for speech difficulty and weakness. Negative for seizures and headaches.   Psychiatric/Behavioral: Positive for confusion.   All other systems reviewed and are negative.       Physical Exam:  /55   Pulse 55   Temp 97.8 °F (36.6 °C) (Oral)   Resp 18   Ht 182.9 cm (72\")   Wt 70.6 kg (155 lb 10.3 oz)   SpO2 100%   BMI 21.11 kg/m²     Physical Exam  Vitals and nursing note reviewed.   Constitutional:       General: He is not in acute distress.     Appearance: Normal appearance. He is not toxic-appearing.   HENT:      Head: Normocephalic and atraumatic.      Jaw: There is normal jaw occlusion.      Mouth/Throat:      Comments: Oral pharynx is dry  Eyes:      General: Lids are normal.      Extraocular Movements: Extraocular movements intact.      Conjunctiva/sclera: Conjunctivae normal.      Pupils: Pupils are equal, round, and reactive to light.   Cardiovascular:      Rate and Rhythm: Normal rate and regular rhythm.      Pulses: Normal pulses.      Heart " sounds: Normal heart sounds.   Pulmonary:      Effort: Pulmonary effort is normal. No respiratory distress.      Breath sounds: Normal breath sounds. No wheezing or rhonchi.   Abdominal:      General: Abdomen is flat.      Palpations: Abdomen is soft.      Tenderness: There is no abdominal tenderness. There is no guarding or rebound.   Musculoskeletal:         General: Normal range of motion.      Cervical back: Normal range of motion and neck supple.      Right lower leg: No edema.      Left lower leg: No edema.   Skin:     General: Skin is warm and dry.   Neurological:      Mental Status: He is alert and oriented to person, place, and time. Mental status is at baseline.      Motor: Weakness (generalized) present.   Psychiatric:         Mood and Affect: Mood normal.                Medications in the Emergency Department:  Medications   sodium chloride 0.9 % flush 10 mL (has no administration in time range)   sodium chloride 0.9 % bolus 1,000 mL (0 mL Intravenous Stopped 10/9/22 1959)        Labs  Lab Results (last 24 hours)     Procedure Component Value Units Date/Time    CBC & Differential [[id number]]  (Abnormal) Collected: 10/09/22 1558    Specimen: Blood Updated: 10/09/22 1628    Narrative:      The following orders were created for panel order CBC & Differential.  Procedure                               Abnormality         Status                     ---------                               -----------         ------                     CBC Auto Differential[[id number]]        Abnormal            Final result               Scan Slide[[id number]]                                       Final result                 Please view results for these tests on the individual orders.    Troponin [[id number]]  (Normal) Collected: 10/09/22 1558    Specimen: Blood Updated: 10/09/22 1701     Troponin T 0.029 ng/mL      Comment: Specimen hemolyzed.  Results may be affected.       Narrative:      Troponin T Reference Range:  <= 0.03  ng/mL-   Negative for AMI  >0.03 ng/mL-     Abnormal for myocardial necrosis.  Clinicians would have to utilize clinical acumen, EKG, Troponin and serial changes to determine if it is an Acute Myocardial Infarction or myocardial injury due to an underlying chronic condition.       Results may be falsely decreased if patient taking Biotin.      Magnesium [[id number]]  (Normal) Collected: 10/09/22 1558    Specimen: Blood Updated: 10/09/22 1648     Magnesium 2.1 mg/dL     CBC Auto Differential [[id number]]  (Abnormal) Collected: 10/09/22 1558    Specimen: Blood Updated: 10/09/22 1628     WBC 11.54 10*3/mm3      RBC 4.60 10*6/mm3      Hemoglobin 14.6 g/dL      Hematocrit 43.9 %      MCV 95.4 fL      MCH 31.7 pg      MCHC 33.3 g/dL      RDW 16.2 %      RDW-SD 57.1 fl      MPV 11.5 fL      Platelets 382 10*3/mm3      Neutrophil % 60.8 %      Lymphocyte % 24.6 %      Monocyte % 12.4 %      Eosinophil % 1.6 %      Basophil % 0.3 %      Immature Grans % 0.3 %      Neutrophils, Absolute 7.02 10*3/mm3      Lymphocytes, Absolute 2.84 10*3/mm3      Monocytes, Absolute 1.43 10*3/mm3      Eosinophils, Absolute 0.19 10*3/mm3      Basophils, Absolute 0.03 10*3/mm3      Immature Grans, Absolute 0.03 10*3/mm3      nRBC 0.0 /100 WBC     Scan Slide [[id number]] Collected: 10/09/22 1558    Specimen: Blood Updated: 10/09/22 1628     Anisocytosis Slight/1+     Macrocytes Slight/1+     Target Cells Slight/1+     WBC Morphology Normal     Platelet Estimate Adequate     Large Platelets Slight/1+    Comprehensive Metabolic Panel [[id number]]  (Abnormal) Collected: 10/09/22 1836    Specimen: Blood Updated: 10/09/22 1915     Glucose 96 mg/dL      BUN 19 mg/dL      Creatinine 0.74 mg/dL      Sodium 138 mmol/L      Potassium 4.6 mmol/L      Comment: Slight hemolysis detected by analyzer. Results may be affected.        Chloride 101 mmol/L      CO2 26.0 mmol/L      Calcium 9.8 mg/dL      Total Protein 7.4 g/dL      Albumin 4.00 g/dL      ALT (SGPT) 28  U/L      AST (SGOT) 21 U/L      Alkaline Phosphatase 200 U/L      Total Bilirubin 0.3 mg/dL      Globulin 3.4 gm/dL      A/G Ratio 1.2 g/dL      BUN/Creatinine Ratio 25.7     Anion Gap 11.0 mmol/L      eGFR 91.6 mL/min/1.73      Comment: National Kidney Foundation and American Society of Nephrology (ASN) Task Force recommended calculation based on the Chronic Kidney Disease Epidemiology Collaboration (CKD-EPI) equation refit without adjustment for race.       Narrative:      GFR Normal >60  Chronic Kidney Disease <60  Kidney Failure <15      Magnesium [[id number]]  (Normal) Collected: 10/09/22 1836    Specimen: Blood Updated: 10/10/22 0035     Magnesium 2.2 mg/dL     Phosphorus [[id number]]  (Normal) Collected: 10/09/22 1836    Specimen: Blood Updated: 10/10/22 0035     Phosphorus 3.0 mg/dL     Urinalysis With Culture If Indicated - Urine, Random Void [[id number]]  (Normal) Collected: 10/09/22 2001    Specimen: Urine, Random Void Updated: 10/09/22 2010     Color, UA Yellow     Appearance, UA Clear     pH, UA 7.5     Specific Gravity, UA 1.020     Glucose, UA Negative     Ketones, UA Negative     Bilirubin, UA Negative     Blood, UA Negative     Protein, UA Negative     Leuk Esterase, UA Negative     Nitrite, UA Negative     Urobilinogen, UA 1.0 E.U./dL    Narrative:      In absence of clinical symptoms, the presence of pyuria, bacteria, and/or nitrites on the urinalysis result does not correlate with infection.  Urine microscopic not indicated.    ABG with Co-Ox and Electrolytes [[id number]]  (Abnormal) Collected: 10/09/22 2037    Specimen: Arterial Blood from Arm, Right Updated: 10/09/22 2040     pH, Arterial 7.445 pH units      pCO2, Arterial 42.2 mm Hg      pO2, Arterial 66.9 mm Hg      HCO3, Arterial 28.3 mmol/L      Base Excess, Arterial 3.9 mmol/L      O2 Saturation, Arterial 93.6 %      Hemoglobin, Blood Gas 14.0 g/dL      Carboxyhemoglobin 1.1 %      Methemoglobin 0.30 %      Oxyhemoglobin 92.3 %      FHHB 6.3 %       Rajiv's Test Positive     Note --     Site Arterial: right radial     Modality Room Air     FIO2 21 %      Sodium, Arterial 137.5 mmol/L      Potassium, Arterial 4.15 mmol/L      Ionized Calcium, Arterial 1.22 mmol/L      Chloride, Arterial 102 mmol/L      Glucose, Arterial 114 mmol/L      Lactate, Arterial 1.19 mmol/L      PO2/FIO2 319    Lactic Acid, Plasma [[id number]]  (Normal) Collected: 10/09/22 2053    Specimen: Blood Updated: 10/09/22 2112     Lactate 1.6 mmol/L            Imaging:  CT Head Without Contrast    Result Date: 10/9/2022  PROCEDURE: CT HEAD WO CONTRAST  COMPARISONS: 9/15/2022; 9/14/2022; 2/15/2022.  INDICATIONS: headache, today.  PROTOCOL:   Standard CT imaging protocol performed.    RADIATION:   Total DLP: 1,714.2 mGy*cm.   MA and/or KV were/was adjusted to minimize radiation dose.    TECHNIQUE: After obtaining the patient's consent, 126 CT images were obtained without non-ionic intravenous contrast material.  FINDINGS:  No acute intracranial hemorrhage is seen.  No acute skull fracture.  No midline shift or acute intracranial herniation syndrome.  No acute infarct.  The other incidental nonemergent findings are as described in prior recent CT exam reports.  Please see those reports for further detail.        No acute brain abnormality is appreciated.     Please note that portions of this note were completed with a voice recognition program.  ORESTES MALCOLM JR, MD       Electronically Signed and Approved By: ORESTES MALCOLM JR, MD on 10/09/2022 at 22:39              XR Chest 1 View    Result Date: 10/9/2022  PROCEDURE: XR CHEST 1 VW  COMPARISON: [facility], CT, CT CHEST W CONTRAST DIAGNOSTIC, 9/14/2022, 22:12.  [facility], CR, XR CHEST 1 VW, 9/14/2022, 20:17.  INDICATIONS: Weak/Dizzy/AMS triage protocol  FINDINGS:  There is a small left pleural effusion with left basilar opacity.  The heart and mediastinal contours appear normal.  The pulmonary vasculature appears  normal.  Surgical hardware is noted within the visualized neck.        1. Small left pleural effusion. 2. Left basilar opacity, which could reflect atelectasis or pneumonia.       ELOISA CHEUNG MD       Electronically Signed and Approved By: ELOISA CHEUNG MD on 10/09/2022 at 16:19               Procedures:  Procedures    Progress  ED Course as of 10/10/22 0044   Mon Oct 10, 2022   0042 EKG:    Rhythm: sinus  Rate: 58  Axis: normal  Intervals: normal  ST Segment: no elevations    EKG Comparison: unchanged    Interpreted by me   [BN]      ED Course User Index  [BN] Raji Paz MD                            Medical Decision Making:  MDM  Number of Diagnoses or Management Options  Weakness  Diagnosis management comments: ABG shows a pH of 7.4 and a PCO2 of 42.  The patient´s CBC that was reviewed and interpreted by me shows no abnormalities of critical concern. Of note, there is no anemia requiring a blood transfusion and the platelet count is acceptable.  The patient´s CMP that was reviewed and interpretted by me shows no abnormalities of critical concern. Of note, the patient´s sodium and potassium are acceptable. The patient´s liver enzymes are unremarkable. The patient´s renal function (creatinine) is preserved. The patient has a normal anion gap.  Magnesium is 2.2.  Phosphorus is 3.  Patient was given a 1 L normal saline bolus emergency department.  CT scan of the head is negative for acute intracranial abnormalities.       Amount and/or Complexity of Data Reviewed  Clinical lab tests: reviewed  Tests in the radiology section of CPT®: reviewed  Tests in the medicine section of CPT®: reviewed  Discussion of test results with the performing providers: yes  Discuss the patient with other providers: yes  Independent visualization of images, tracings, or specimens: yes    Risk of Complications, Morbidity, and/or Mortality  Presenting problems: moderate  Management options: moderate    Patient  Progress  Patient progress: stable       Final diagnoses:   Weakness        Disposition:  ED Disposition     ED Disposition   Decision to Admit    Condition   --    Comment   Level of Care: Telemetry [5]   Diagnosis: Fatigue [[id number]]               This medical record created using voice recognition software.        Documentation assistance provided by Dinh Swenson acting as scribe for Raji Paz MD. Information recorded by the scribe was done at my direction and has been verified and validated by me.          Dinh Swenson  10/09/22 1849       Raji Paz MD  10/10/22 0044

## 2022-10-10 PROBLEM — E86.0 DEHYDRATION: Status: ACTIVE | Noted: 2022-10-10

## 2022-10-10 PROBLEM — R53.83 FATIGUE: Status: ACTIVE | Noted: 2022-10-10

## 2022-10-10 PROBLEM — R53.1 WEAKNESS: Status: ACTIVE | Noted: 2022-10-10

## 2022-10-10 LAB
AMMONIA BLD-SCNC: 21 UMOL/L (ref 16–60)
ANISOCYTOSIS BLD QL: ABNORMAL
BASOPHILS # BLD MANUAL: 0.12 10*3/MM3 (ref 0–0.2)
BASOPHILS NFR BLD MANUAL: 1 % (ref 0–1.5)
DEPRECATED RDW RBC AUTO: 54 FL (ref 37–54)
ERYTHROCYTE [DISTWIDTH] IN BLOOD BY AUTOMATED COUNT: 15.8 % (ref 12.3–15.4)
HCT VFR BLD AUTO: 36.3 % (ref 37.5–51)
HGB BLD-MCNC: 12.3 G/DL (ref 13–17.7)
LARGE PLATELETS: ABNORMAL
LYMPHOCYTES # BLD MANUAL: 2.66 10*3/MM3 (ref 0.7–3.1)
LYMPHOCYTES NFR BLD MANUAL: 13 % (ref 5–12)
MACROCYTES BLD QL SMEAR: ABNORMAL
MAGNESIUM SERPL-MCNC: 2.2 MG/DL (ref 1.6–2.4)
MCH RBC QN AUTO: 31.6 PG (ref 26.6–33)
MCHC RBC AUTO-ENTMCNC: 33.9 G/DL (ref 31.5–35.7)
MCV RBC AUTO: 93.3 FL (ref 79–97)
MONOCYTES # BLD: 1.5 10*3/MM3 (ref 0.1–0.9)
NEUTROPHILS # BLD AUTO: 7.28 10*3/MM3 (ref 1.7–7)
NEUTROPHILS NFR BLD MANUAL: 63 % (ref 42.7–76)
PHOSPHATE SERPL-MCNC: 3 MG/DL (ref 2.5–4.5)
PLATELET # BLD AUTO: 300 10*3/MM3 (ref 140–450)
PMV BLD AUTO: 11.4 FL (ref 6–12)
PROCALCITONIN SERPL-MCNC: 0.07 NG/ML (ref 0–0.25)
QT INTERVAL: 428 MS
RBC # BLD AUTO: 3.89 10*6/MM3 (ref 4.14–5.8)
S PYO AG THROAT QL: NEGATIVE
SMALL PLATELETS BLD QL SMEAR: ADEQUATE
T4 FREE SERPL-MCNC: 0.9 NG/DL (ref 0.93–1.7)
TARGETS BLD QL SMEAR: ABNORMAL
TSH SERPL DL<=0.05 MIU/L-ACNC: 2.27 UIU/ML (ref 0.27–4.2)
VARIANT LYMPHS NFR BLD MANUAL: 23 % (ref 19.6–45.3)
WBC MORPH BLD: NORMAL
WBC NRBC COR # BLD: 11.55 10*3/MM3 (ref 3.4–10.8)

## 2022-10-10 PROCEDURE — 25010000002 CEFTRIAXONE PER 250 MG: Performed by: STUDENT IN AN ORGANIZED HEALTH CARE EDUCATION/TRAINING PROGRAM

## 2022-10-10 PROCEDURE — 85027 COMPLETE CBC AUTOMATED: CPT | Performed by: STUDENT IN AN ORGANIZED HEALTH CARE EDUCATION/TRAINING PROGRAM

## 2022-10-10 PROCEDURE — 94799 UNLISTED PULMONARY SVC/PX: CPT

## 2022-10-10 PROCEDURE — 85007 BL SMEAR W/DIFF WBC COUNT: CPT | Performed by: STUDENT IN AN ORGANIZED HEALTH CARE EDUCATION/TRAINING PROGRAM

## 2022-10-10 PROCEDURE — 84145 PROCALCITONIN (PCT): CPT | Performed by: STUDENT IN AN ORGANIZED HEALTH CARE EDUCATION/TRAINING PROGRAM

## 2022-10-10 PROCEDURE — 99222 1ST HOSP IP/OBS MODERATE 55: CPT | Performed by: STUDENT IN AN ORGANIZED HEALTH CARE EDUCATION/TRAINING PROGRAM

## 2022-10-10 PROCEDURE — 84439 ASSAY OF FREE THYROXINE: CPT | Performed by: STUDENT IN AN ORGANIZED HEALTH CARE EDUCATION/TRAINING PROGRAM

## 2022-10-10 PROCEDURE — 87081 CULTURE SCREEN ONLY: CPT | Performed by: INTERNAL MEDICINE

## 2022-10-10 PROCEDURE — 84443 ASSAY THYROID STIM HORMONE: CPT | Performed by: STUDENT IN AN ORGANIZED HEALTH CARE EDUCATION/TRAINING PROGRAM

## 2022-10-10 PROCEDURE — 92610 EVALUATE SWALLOWING FUNCTION: CPT

## 2022-10-10 PROCEDURE — 97161 PT EVAL LOW COMPLEX 20 MIN: CPT

## 2022-10-10 PROCEDURE — 87880 STREP A ASSAY W/OPTIC: CPT | Performed by: INTERNAL MEDICINE

## 2022-10-10 PROCEDURE — 82140 ASSAY OF AMMONIA: CPT | Performed by: STUDENT IN AN ORGANIZED HEALTH CARE EDUCATION/TRAINING PROGRAM

## 2022-10-10 RX ORDER — LACTULOSE 10 G/15ML
20 SOLUTION ORAL AS NEEDED
Status: DISCONTINUED | OUTPATIENT
Start: 2022-10-10 | End: 2022-10-14 | Stop reason: HOSPADM

## 2022-10-10 RX ORDER — CEFTRIAXONE SODIUM 1 G/50ML
1 INJECTION, SOLUTION INTRAVENOUS ONCE
Status: COMPLETED | OUTPATIENT
Start: 2022-10-10 | End: 2022-10-10

## 2022-10-10 RX ORDER — FINASTERIDE 5 MG/1
5 TABLET, FILM COATED ORAL DAILY
Status: DISCONTINUED | OUTPATIENT
Start: 2022-10-10 | End: 2022-10-14 | Stop reason: HOSPADM

## 2022-10-10 RX ORDER — SODIUM CHLORIDE 9 MG/ML
40 INJECTION, SOLUTION INTRAVENOUS AS NEEDED
Status: DISCONTINUED | OUTPATIENT
Start: 2022-10-10 | End: 2022-10-14 | Stop reason: HOSPADM

## 2022-10-10 RX ORDER — SODIUM CHLORIDE 0.9 % (FLUSH) 0.9 %
10 SYRINGE (ML) INJECTION EVERY 12 HOURS SCHEDULED
Status: DISCONTINUED | OUTPATIENT
Start: 2022-10-10 | End: 2022-10-14 | Stop reason: HOSPADM

## 2022-10-10 RX ORDER — ASPIRIN 81 MG/1
81 TABLET, CHEWABLE ORAL DAILY
Status: DISCONTINUED | OUTPATIENT
Start: 2022-10-10 | End: 2022-10-14 | Stop reason: HOSPADM

## 2022-10-10 RX ORDER — SODIUM CHLORIDE 0.9 % (FLUSH) 0.9 %
10 SYRINGE (ML) INJECTION AS NEEDED
Status: DISCONTINUED | OUTPATIENT
Start: 2022-10-10 | End: 2022-10-14 | Stop reason: HOSPADM

## 2022-10-10 RX ORDER — SODIUM CHLORIDE, SODIUM LACTATE, POTASSIUM CHLORIDE, CALCIUM CHLORIDE 600; 310; 30; 20 MG/100ML; MG/100ML; MG/100ML; MG/100ML
100 INJECTION, SOLUTION INTRAVENOUS CONTINUOUS
Status: ACTIVE | OUTPATIENT
Start: 2022-10-10 | End: 2022-10-10

## 2022-10-10 RX ORDER — PRIMIDONE 250 MG/1
250 TABLET ORAL 3 TIMES DAILY
Status: DISCONTINUED | OUTPATIENT
Start: 2022-10-10 | End: 2022-10-14 | Stop reason: HOSPADM

## 2022-10-10 RX ORDER — NITROGLYCERIN 0.4 MG/1
0.4 TABLET SUBLINGUAL
Status: DISCONTINUED | OUTPATIENT
Start: 2022-10-10 | End: 2022-10-14 | Stop reason: HOSPADM

## 2022-10-10 RX ORDER — LEVOTHYROXINE SODIUM 0.05 MG/1
50 TABLET ORAL
Status: DISCONTINUED | OUTPATIENT
Start: 2022-10-10 | End: 2022-10-14 | Stop reason: HOSPADM

## 2022-10-10 RX ADMIN — LEVOTHYROXINE SODIUM 50 MCG: 50 TABLET ORAL at 09:19

## 2022-10-10 RX ADMIN — Medication 10 ML: at 09:19

## 2022-10-10 RX ADMIN — ASPIRIN 81 MG CHEWABLE TABLET 81 MG: 81 TABLET CHEWABLE at 09:19

## 2022-10-10 RX ADMIN — FINASTERIDE 5 MG: 5 TABLET, FILM COATED ORAL at 09:19

## 2022-10-10 RX ADMIN — Medication 10 ML: at 20:15

## 2022-10-10 RX ADMIN — SODIUM CHLORIDE, POTASSIUM CHLORIDE, SODIUM LACTATE AND CALCIUM CHLORIDE 100 ML/HR: 600; 310; 30; 20 INJECTION, SOLUTION INTRAVENOUS at 10:17

## 2022-10-10 RX ADMIN — PRIMIDONE 250 MG: 250 TABLET ORAL at 15:05

## 2022-10-10 RX ADMIN — PRIMIDONE 250 MG: 250 TABLET ORAL at 20:14

## 2022-10-10 RX ADMIN — PRIMIDONE 250 MG: 250 TABLET ORAL at 09:19

## 2022-10-10 RX ADMIN — SODIUM CHLORIDE, POTASSIUM CHLORIDE, SODIUM LACTATE AND CALCIUM CHLORIDE 100 ML/HR: 600; 310; 30; 20 INJECTION, SOLUTION INTRAVENOUS at 02:17

## 2022-10-10 RX ADMIN — CEFTRIAXONE SODIUM 1 G: 1 INJECTION, SOLUTION INTRAVENOUS at 09:18

## 2022-10-10 NOTE — PLAN OF CARE
Goal Outcome Evaluation:              Outcome Evaluation: Pt has been very confused throughout the day. Pt has had repeated requests, frequently seeks out staff. Upon entry to room, pt states he needs oral care. Oral care has been completed q2hrs throughout the day today and PRN. Pt is having acute urinary retention, unable to complete straight cath per orders, MD notified and coude mtz placed per MD orders. Pt tolerated fairly, wife at bedside some earlier today. No other concerns noted or voiced.

## 2022-10-10 NOTE — THERAPY EVALUATION
Acute Care - Speech Language Pathology   Swallow Initial Evaluation [facility]     Patient Name: Wood Desai  : 1940  MRN: [id number]  Today's Date: 10/10/2022               Admit Date: 10/9/2022    Visit Dx:     ICD-10-CM ICD-9-CM   1. Weakness  R53.1 780.79   2. Oropharyngeal dysphagia  R13.12 787.22     Patient Active Problem List   Diagnosis   • Acute cystitis   • BPH (benign prostatic hyperplasia)   • Gout   • High blood pressure   • Coronary artery disease involving native heart without angina pectoris   • Pneumonia of left lung due to infectious organism   • Elevated troponin   • Benign prostatic hyperplasia with urinary frequency   • Malignant neoplasm of urinary bladder (HCC)   • SAH (subarachnoid hemorrhage) (HCC)   • Fatigue   • Dehydration     Past Medical History:   Diagnosis Date   • Acute cystitis 2018   • BPH (benign prostatic hyperplasia)    • Brain bleed (HCC)    • Broken ankle    • Broken neck (HCC)    • Coronary artery disease involving native heart without angina pectoris 2022   • Gout    • High blood pressure    • Myocardial infarction (HCC)      Past Surgical History:   Procedure Laterality Date   • COLON SURGERY     • CRANIOTOMY     • CYSTOSCOPY     • EXTERNAL EAR SURGERY     • HERNIA REPAIR     • NECK SURGERY     • NOSE SURGERY     • SHOULDER SURGERY     • SKIN CANCER EXCISION     • SPLENECTOMY           Inpatient Speech Pathology Dysphagia Evaluation        PAIN SCALE: None indicated    PRECAUTIONS/CONTRAINDICATIONS: Standard    SUSPECTED ABUSE/NEGLECT/EXPLOITATION: None identified    SOCIAL/PSYCHOLOGICAL NEEDS/BARRIERS: None identified    PAST SOCIAL HISTORY: 82-year-old male, recently discharged from hospital, at home with his wife    PRIOR FUNCTION: History of dysphagia with PEG.  Patient however on a mechanical soft diet and thin liquids following recent discharge from [facility].    PATIENT GOALS/EXPECTATIONS: To begin eating and  drinking    HISTORY: Patient is an 82-year-old male referred for speech pathology dysphagia evaluation for assessment of swallow function and aspiration risk.  He was recently evaluated and treated by speech pathology at [facility] in September 2022.  At that time diet recommendations mechanical soft solids and thin liquids were given.  Patient had his PEG removed on 9/26/2022.  Patient presents to [facility] with fatigue and generalized weakness.  He is currently n.p.o. pending results of bedside dysphagia evaluation by speech pathology.    CURRENT DIET LEVEL: N.p.o.    OBJECTIVE:    TEST ADMINISTERED: Clinical dysphagia evaluation    COGNITION/SAFETY AWARENESS: History of TBI    BEHAVIORAL OBSERVATIONS: Patient sleeping however awakens easily.  He is able to correctly state his name and date of birth as well as his current environment.  Follows commands with only minimal cueing.    ORAL MOTOR EXAM: Patient wears upper and lower dentures.    VOICE QUALITY: Clear    REFLEX EXAM: Within functional limits    POSTURE: Sitting fully upright, requiring total assist    FEEDING/SWALLOWING FUNCTION: Assessed with ice chips, thin liquids, nectar thick liquids, purée solids and mechanical soft solids    CLINICAL OBSERVATIONS: Single ice chips for swallows completed with mild delay.  Vocal quality clear and laryngeal sounds clear with cervical auscultation.  Nectar thick liquids by spoon and by control cup drink.  Swallow completed with mild delay.  Vocal quality and laryngeal sounds clear.  Thin liquids by spoon and by cup with swallows completed with mild delay.  Vocal quality laryngeal sounds clear.  Purée solids by spoon with lingual pumping.  Swallow completed with mild delay.  Double swallow observed.  Mechanical soft solids with anterior chewing.  Lingual pumping with swallow completed with mild delay.  Diffuse oral residue however clears with moderate cues for lingual sweep and liquid wash assist  of thin liquid via cup.  Single controlled sips of thin liquid via straw with swallows completed with delayed wet coughing.  Repeat trial of thin liquids via controlled cup drink with swallows completed.  Patient demonstrating signs of aspiration with thin liquids via straw.  No overt signs or symptoms of aspiration observed with other consistencies at bedside however cannot rule out silent aspiration.    DYSPHAGIA CRITERIA: Risk of aspiration, swallow delay    FUNCTIONAL ASSESSMENT INSTRUMENT: Patient currently scored a level 6 of 7 on Functional Communication Measures for swallowing indicating a 1-19% limitation in function.    ASSESSMENT/ PLAN OF CARE:  Pt presents with limitations, noted below, that impede patient's ability to tolerate least restrictive diet safely and independently. The skills of a therapist will be required to safely and effectively implement the following treatment plan to restore maximal level of function.    PROBLEMS:  1.  Risk of aspiration, swallow delay   LTG 1: 30 days.  Patient will increase functional communication measures for swallowing to level 7 of 7, indicating a 0% limitation in function.   STG 1a: 14 days.  Patient will tolerate least restrictive diet of mechanical soft solids, thin liquids with minimal to no signs or symptoms of aspiration.   STG 1b: 14 days.  Patient will tolerate least restrictive diet of mechanical soft solids, thin liquids utilizing compensatory strategies with minimal cueing.   STG 1c: 14 days.  Patient will tolerate least restrictive diet of mechanical soft solids, thin liquids utilizing compensatory strategies with moderate cueing.   TREATMENT: Speech therapy for dysphagia, education of strategies and tolerance of least restrictive diet.      FREQUENCY/DURATION: Daily, 5 days a week    REHAB POTENTIAL:  Pt has good rehab potential.  The following limitations may influence improvement/ length of tx medical status.    RECOMMENDATIONS:   1.   DIET:  Mechanical soft solids, thin liquids with no straw    2.  POSITION: Fully upright for all p.o. intake, 30 minutes following    3.  COMPENSATORY STRATEGIES: One-on-one assist for self-feeding, small bites and sips, crushed meds in applesauce or pudding    Pt/responsible party agrees with plan of care and has been informed of all alternatives, risks and benefits.  SLP Recommendation and Plan                          Anticipated Discharge Disposition (SLP): home with home health, skilled nursing facility (10/10/22 1158)                                                         EDUCATION  The patient has been educated in the following areas:   Dysphagia (Swallowing Impairment).              Time Calculation:    Time Calculation- SLP     Row Name 10/10/22 1158             Time Calculation- SLP    SLP Start Time 1055  -SN      SLP Stop Time 1155  -SN      SLP Time Calculation (min) 60 min  -SN      SLP Received On 10/10/22  -SN         Untimed Charges    51824-BL Eval Oral Pharyng Swallow Minutes 60  -SN         Total Minutes    Untimed Charges Total Minutes 60  -SN       Total Minutes 60  -SN            User Key  (r) = Recorded By, (t) = Taken By, (c) = Cosigned By    Initials Name Provider Type    SN Hermelinda Reyes SLP Speech and Language Pathologist                Therapy Charges for Today     Code Description Service Date Service Provider Modifiers Qty    [id number] HC ST EVAL ORAL PHARYNG SWALLOW 4 10/10/2022 Hermelinda Reyes SLP GN 1               JOSE J Sandoval  10/10/2022

## 2022-10-10 NOTE — PLAN OF CARE
Goal Outcome Evaluation:  Plan of Care Reviewed With: patient    ASSESSMENT/ PLAN OF CARE:  Pt presents with limitations, noted below, that impede patient's ability to tolerate least restrictive diet safely and independently. The skills of a therapist will be required to safely and effectively implement the following treatment plan to restore maximal level of function.    PROBLEMS:  1.  Risk of aspiration, swallow delay  TREATMENT: Speech therapy for dysphagia, education of strategies and tolerance of least restrictive diet.      FREQUENCY/DURATION: Daily, 5 days a week    REHAB POTENTIAL:  Pt has good rehab potential.  The following limitations may influence improvement/ length of tx medical status.    RECOMMENDATIONS:   1.   DIET: Mechanical soft solids, thin liquids with no straw    2.  POSITION: Fully upright for all p.o. intake, 30 minutes following    3.  COMPENSATORY STRATEGIES: One-on-one assist for self-feeding, small bites and sips, crushed meds in applesauce or pudding

## 2022-10-10 NOTE — ED NOTES
Placed pt on 2L O2 NC due to O2 sat dropping to 85% while pt appeared to be asleep. Pt woke up and O2 came up to 98% RA but this RN kept O2 on pt due to pt wanting to try to sleep.

## 2022-10-10 NOTE — PLAN OF CARE
Goal Outcome Evaluation:              Outcome Evaluation: patient arrived to 4mtu this shift. patient alert to self and place. patient asleep but easily awakened. VSS. LR running at 100ml/hr. patient denies pain. call light within reach. will continue monitoring.

## 2022-10-10 NOTE — CONSULTS
"Nutrition Services    Patient Name: Wood Desai  YOB: 1940  MRN: [id number]  Admission date: 10/9/2022      CLINICAL NUTRITION ASSESSMENT      Reason for Assessment  MST score 2+     H&P:    Past Medical History:   Diagnosis Date   • Acute cystitis 04/17/2018   • BPH (benign prostatic hyperplasia)    • Brain bleed (HCC)    • Broken ankle    • Broken neck (HCC)    • Coronary artery disease involving native heart without angina pectoris 01/03/2022   • Gout    • High blood pressure    • Myocardial infarction (HCC)         Current Problems:   Active Hospital Problems    Diagnosis    • **Fatigue    • Dehydration         Nutrition/Diet History         Narrative     Patient admitted with generalized weakness and dehydration.  MD notes indicate poor PO intake prior to admission.  Patient evaluated by speech therapy and will start on a mechanical soft diet.      Nutrition consult for MST score 2+.  Patient was unsure of weight loss on admission.  Chart review reveals stable body weight x 8 months.  Will monitor PO intake and provide intervention if indicated.         Anthropometrics        Current Height, Weight Height: 182.9 cm (72\")  Weight: 70.6 kg (155 lb 10.3 oz)   Current BMI Body mass index is 21.11 kg/m².       Weight Hx  Wt Readings from Last 30 Encounters:   10/09/22 1541 70.6 kg (155 lb 10.3 oz)   09/16/22 0957 75.2 kg (165 lb 12.6 oz)   09/15/22 0300 75.2 kg (165 lb 12.6 oz)   09/14/22 1950 77.4 kg (170 lb 10.2 oz)   02/16/22 1114 68 kg (149 lb 14.6 oz)   02/16/22 0500 68.1 kg (150 lb 2.1 oz)   02/15/22 2045 73.3 kg (161 lb 9.6 oz)   04/26/21 0000 81.6 kg (180 lb)   12/28/20 0000 83 kg (183 lb)   04/17/19 0000 85.3 kg (188 lb)   04/15/19 0000 85.3 kg (188 lb)   10/12/18 0000 77.1 kg (170 lb)   07/11/18 0000 76.2 kg (168 lb)   05/25/18 0000 76.2 kg (168 lb)   05/02/18 0000 77.1 kg (170 lb)   04/13/18 0000 77.1 kg (170 lb)   04/05/18 0000 76.4 kg (168 lb 8 oz)   01/19/18 0000 80.5 kg (177 lb 8 " oz)            Wt Change Observation Stable x 8 months      Estimated/Assessed Needs       Energy Requirements    EST Needs (kcal/day) 2118 kcal        Protein Requirements    EST Daily Needs (g/day) 71-85 g pro        Fluid Requirements     Estimated Needs (mL/day) 2118 ml fluid      Labs/Medications         Pertinent Labs Reviewed.   Results from last 7 days   Lab Units 10/09/22  2037 10/09/22  1836   SODIUM mmol/L  --  138   SODIUM, ARTERIAL mmol/L 137.5  --    POTASSIUM mmol/L  --  4.6   CHLORIDE mmol/L  --  101   CO2 mmol/L  --  26.0   BUN mg/dL  --  19   CREATININE mg/dL  --  0.74*   CALCIUM mg/dL  --  9.8   BILIRUBIN mg/dL  --  0.3   ALK PHOS U/L  --  200*   ALT (SGPT) U/L  --  28   AST (SGOT) U/L  --  21   GLUCOSE mg/dL  --  96   GLUCOSE, ARTERIAL mmol/L 114*  --      Results from last 7 days   Lab Units 10/10/22  0810 10/09/22  1836 10/09/22  1558   MAGNESIUM mg/dL  --  2.2 2.1   PHOSPHORUS mg/dL  --  3.0  --    HEMOGLOBIN g/dL 12.3*  --  14.6   HEMATOCRIT % 36.3*  --  43.9     No results found for: COVID19  Lab Results   Component Value Date    HGBA1C 6.50 (H) 09/15/2022         Pertinent Medications Reviewed.     Current Nutrition Orders & Evaluation of Intake       Oral Nutrition     Current PO Diet NPO Diet NPO Type: Sips with Meds   Supplement No active supplement orders       Malnutrition Severity Assessment                Nutrition Diagnosis         Nutrition Dx Problem 1 No nutrition diagnosis at this time        Nutrition Intervention         Will monitor intake and provide further nutrition intervention if indicated.       Medical Nutrition Therapy/Nutrition Education          Learner     Readiness N/A  N/A     Method     Response N/A  N/A     Monitor/Evaluation        Monitor PO intake, Weight       Nutrition Discharge Plan         To be determined       Electronically signed by:  Zuleika Saeed RD  10/10/22 14:05 EDT

## 2022-10-10 NOTE — THERAPY EVALUATION
Acute Care - Physical Therapy Initial Evaluation   [facility]     Patient Name: Wood Desai  : 1940  MRN: [id number]  Today's Date: 10/10/2022      Visit Dx:     ICD-10-CM ICD-9-CM   1. Weakness  R53.1 780.79   2. Oropharyngeal dysphagia  R13.12 787.22   3. Difficulty walking  R26.2 719.7     Patient Active Problem List   Diagnosis   • Acute cystitis   • BPH (benign prostatic hyperplasia)   • Gout   • High blood pressure   • Coronary artery disease involving native heart without angina pectoris   • Pneumonia of left lung due to infectious organism   • Elevated troponin   • Benign prostatic hyperplasia with urinary frequency   • Malignant neoplasm of urinary bladder (HCC)   • SAH (subarachnoid hemorrhage) (HCC)   • Fatigue   • Dehydration     Past Medical History:   Diagnosis Date   • Acute cystitis 2018   • BPH (benign prostatic hyperplasia)    • Brain bleed (HCC)    • Broken ankle    • Broken neck (HCC)    • Coronary artery disease involving native heart without angina pectoris 2022   • Gout    • High blood pressure    • Myocardial infarction (HCC)      Past Surgical History:   Procedure Laterality Date   • COLON SURGERY     • CRANIOTOMY     • CYSTOSCOPY     • EXTERNAL EAR SURGERY     • HERNIA REPAIR     • NECK SURGERY     • NOSE SURGERY     • SHOULDER SURGERY     • SKIN CANCER EXCISION     • SPLENECTOMY       PT Assessment (last 12 hours)     PT Evaluation and Treatment     Row Name 10/10/22 1500          Physical Therapy Time and Intention    Document Type evaluation  -AV     Mode of Treatment individual therapy;physical therapy  -AV     Row Name 10/10/22 1500          General Information    Patient Profile Reviewed yes  -AV     Prior Level of Function --  Spouse assists with ADLs. Ambulated with rolling walker. No home O2. Ramp.  -AV     Equipment Currently Used at Home walker, rolling  -AV     Existing Precautions/Restrictions fall  -AV     Row Name 10/10/22 1500          Living  Environment    Current Living Arrangements home  -AV     People in Home spouse  -AV     Row Name 10/10/22 1500          Range of Motion (ROM)    Range of Motion bilateral lower extremities;ROM is WFL  -     Row Name 10/10/22 1500          Strength (Manual Muscle Testing)    Strength (Manual Muscle Testing) bilateral lower extremities  4-/5 grossly  -     Row Name 10/10/22 1500          Bed Mobility    Bed Mobility supine-sit;sit-supine  -AV     Supine-Sit [place] (Bed Mobility) moderate assist (50% patient effort)  -AV     Sit-Supine [place] (Bed Mobility) minimum assist (75% patient effort)  -AV     Row Name 10/10/22 1500          Transfers    Transfers sit-stand transfer;stand-sit transfer  -AV     Row Name 10/10/22 1500          Sit-Stand Transfer    Sit-Stand [place] (Transfers) minimum assist (75% patient effort);verbal cues;1 person assist  -AV     Assistive Device (Sit-Stand Transfers) walker, front-wheeled  -AV     Row Name 10/10/22 1500          Stand-Sit Transfer    Stand-Sit [place] (Transfers) minimum assist (75% patient effort);verbal cues;1 person assist  -AV     Assistive Device (Stand-Sit Transfers) walker, front-wheeled  -AV     Row Name 10/10/22 1500          Gait/Stairs (Locomotion)    Gait/Stairs Locomotion gait/ambulation independence;gait/ambulation assistive device;distance ambulated  -     [place] Level (Gait) minimum assist (75% patient effort);verbal cues;1 person assist  -AV     Assistive Device (Gait) walker, front-wheeled  -AV     Distance in Feet (Gait) 3 sidestepping left  Declined further ambulatiion/sitting up in recliner  -     Row Name 10/10/22 1500          Safety Issues, Functional Mobility    Impairments Affecting Function (Mobility) balance;endurance/activity tolerance;strength;shortness of breath  -     Row Name 10/10/22 1500          Balance    Balance Assessment standing dynamic balance  -AV     Dynamic Standing Balance minimal  assist;verbal cues;1-person assist  -AV     Position/Device Used, Standing Balance supported;walker, front-wheeled  -AV     Row Name 10/10/22 1500          Plan of Care Review    Plan of Care Reviewed With patient  -AV     Progress no change  -AV     Outcome Evaluation Patient presents with deficits in balance, endurance, transfers, and ambulation. Patient will benefit from skilled PT services to address these mobility deficits and decrease risk of falls.  -AV     Row Name 10/10/22 1500          Therapy Assessment/Plan (PT)    Rehab Potential (PT) good, to achieve stated therapy goals  -AV     Criteria for Skilled Interventions Met (PT) yes;meets criteria  -AV     Therapy Frequency (PT) daily  -AV     Problem List (PT) problems related to;balance;mobility;strength  -AV     Activity Limitations Related to Problem List (PT) unable to transfer safely;unable to ambulate safely  -AV     Row Name 10/10/22 1500          PT Evaluation Complexity    History, PT Evaluation Complexity 1-2 personal factors and/or comorbidities  -AV     Examination of Body Systems (PT Eval Complexity) total of 4 or more elements  -AV     Clinical Presentation (PT Evaluation Complexity) stable  -AV     Clinical Decision Making (PT Evaluation Complexity) low complexity  -AV     Overall Complexity (PT Evaluation Complexity) low complexity  -AV     Row Name 10/10/22 1500          Therapy Plan Review/Discharge Plan (PT)    Therapy Plan Review (PT) evaluation/treatment results reviewed;patient  -AV     Row Name 10/10/22 1500          Physical Therapy Goals    Bed Mobility Goal Selection (PT) bed mobility, PT goal 1  -AV     Transfer Goal Selection (PT) transfer, PT goal 1  -AV     Gait Training Goal Selection (PT) gait training, PT goal 1  -AV     Row Name 10/10/22 1500          Bed Mobility Goal 1 (PT)    Activity/Assistive Device (Bed Mobility Goal 1, PT) sit to supine/supine to sit  -AV     [place] Level/Cues Needed (Bed Mobility Goal 1, PT)  supervision required  -AV     Time Frame (Bed Mobility Goal 1, PT) 10 days  -AV     Row Name 10/10/22 1500          Transfer Goal 1 (PT)    Activity/Assistive Device (Transfer Goal 1, PT) sit-to-stand/stand-to-sit;bed-to-chair/chair-to-bed;walker, rolling  -AV     [place] Level/Cues Needed (Transfer Goal 1, PT) supervision required  -AV     Time Frame (Transfer Goal 1, PT) 10 days  -AV     Row Name 10/10/22 1500          Gait Training Goal 1 (PT)    Activity/Assistive Device (Gait Training Goal 1, PT) gait (walking locomotion);assistive device use;walker, rolling  -AV     [place] Level (Gait Training Goal 1, PT) supervision required  -AV     Distance (Gait Training Goal 1, PT) 150  -AV     Time Frame (Gait Training Goal 1, PT) 10 days  -AV           User Key  (r) = Recorded By, (t) = Taken By, (c) = Cosigned By    Initials Name Provider Type    AV Ihsan Harris, PT Physical Therapist                Physical Therapy Education     Title: PT OT SLP Therapies (In Progress)     Topic: Physical Therapy (In Progress)     Point: Mobility training (Done)     Learning Progress Summary           Patient Acceptance, E,TB, VU by AV at 10/10/2022 1527                   Point: Home exercise program (Not Started)     Learner Progress:  Not documented in this visit.          Point: Body mechanics (Done)     Learning Progress Summary           Patient Acceptance, E,TB, VU by AV at 10/10/2022 1527                   Point: Precautions (Done)     Learning Progress Summary           Patient Acceptance, E,TB, VU by AV at 10/10/2022 1527                               User Key     Initials Effective Dates Name Provider Type Discipline     06/11/21 -  Ihsan Harris, PT Physical Therapist PT              PT Recommendation and Plan  Anticipated Discharge Disposition (PT): inpatient rehabilitation facility, home with home health  Planned Therapy Interventions (PT): balance training, bed mobility training, gait training,  neuromuscular re-education, strengthening, transfer training  Therapy Frequency (PT): daily  Plan of Care Reviewed With: patient  Progress: no change  Outcome Evaluation: Patient presents with deficits in balance, endurance, transfers, and ambulation. Patient will benefit from skilled PT services to address these mobility deficits and decrease risk of falls.   Outcome Measures     Row Name 10/10/22 1500             How much help from another person do you currently need...    Turning from your back to your side while in flat bed without using bedrails? 3  -AV      Moving from lying on back to sitting on the side of a flat bed without bedrails? 2  -AV      Moving to and from a bed to a chair (including a wheelchair)? 3  -AV      Standing up from a chair using your arms (e.g., wheelchair, bedside chair)? 3  -AV      Climbing 3-5 steps with a railing? 2  -AV      To walk in hospital room? 3  -AV      AM-PAC 6 Clicks Score (PT) 16  -AV         Functional Assessment    Outcome Measure Options AM-PAC 6 Clicks Basic Mobility (PT)  -AV            User Key  (r) = Recorded By, (t) = Taken By, (c) = Cosigned By    Initials Name Provider Type    AV Ihsan Harris, PT Physical Therapist                 Time Calculation:    PT Charges     Row Name 10/10/22 1526             Time Calculation    PT Received On 10/10/22  -AV      PT Goal Re-Cert Due Date 10/19/22  -AV         Untimed Charges    PT Eval/Re-eval Minutes 40  -AV         Total Minutes    Untimed Charges Total Minutes 40  -AV       Total Minutes 40  -AV            User Key  (r) = Recorded By, (t) = Taken By, (c) = Cosigned By    Initials Name Provider Type    AV Ihsan Harris, DEBBIE Physical Therapist              Therapy Charges for Today     Code Description Service Date Service Provider Modifiers Qty    63363049312 HC PT EVAL LOW COMPLEXITY 3 10/10/2022 Ihsan Harris, PT GP 1          PT G-Codes  Outcome Measure Options: AM-PAC 6 Clicks Basic Mobility  (PT)  AM-PAC 6 Clicks Score (PT): 16    Ihsan Harris, PT  10/10/2022

## 2022-10-11 LAB
ANION GAP SERPL CALCULATED.3IONS-SCNC: 13.1 MMOL/L (ref 5–15)
BACTERIA SPEC AEROBE CULT: NORMAL
BUN SERPL-MCNC: 16 MG/DL (ref 8–23)
BUN/CREAT SERPL: 23.5 (ref 7–25)
CALCIUM SPEC-SCNC: 9.1 MG/DL (ref 8.6–10.5)
CHLORIDE SERPL-SCNC: 102 MMOL/L (ref 98–107)
CO2 SERPL-SCNC: 24.9 MMOL/L (ref 22–29)
CREAT SERPL-MCNC: 0.68 MG/DL (ref 0.76–1.27)
DEPRECATED RDW RBC AUTO: 53.8 FL (ref 37–54)
EGFRCR SERPLBLD CKD-EPI 2021: 94 ML/MIN/1.73
ERYTHROCYTE [DISTWIDTH] IN BLOOD BY AUTOMATED COUNT: 15.4 % (ref 12.3–15.4)
FLUAV AG NPH QL: NEGATIVE
FLUBV AG NPH QL IA: NEGATIVE
GLUCOSE SERPL-MCNC: 99 MG/DL (ref 65–99)
HCT VFR BLD AUTO: 37.2 % (ref 37.5–51)
HGB BLD-MCNC: 12.3 G/DL (ref 13–17.7)
L PNEUMO1 AG UR QL IA: NEGATIVE
MCH RBC QN AUTO: 31 PG (ref 26.6–33)
MCHC RBC AUTO-ENTMCNC: 33.1 G/DL (ref 31.5–35.7)
MCV RBC AUTO: 93.7 FL (ref 79–97)
MRSA DNA SPEC QL NAA+PROBE: NORMAL
NT-PROBNP SERPL-MCNC: 352.1 PG/ML (ref 0–1800)
PLATELET # BLD AUTO: 293 10*3/MM3 (ref 140–450)
PMV BLD AUTO: 11.3 FL (ref 6–12)
POTASSIUM SERPL-SCNC: 4.1 MMOL/L (ref 3.5–5.2)
RBC # BLD AUTO: 3.97 10*6/MM3 (ref 4.14–5.8)
S PNEUM AG SPEC QL LA: NEGATIVE
SARS-COV-2 RNA PNL SPEC NAA+PROBE: NOT DETECTED
SODIUM SERPL-SCNC: 140 MMOL/L (ref 136–145)
WBC NRBC COR # BLD: 12.45 10*3/MM3 (ref 3.4–10.8)

## 2022-10-11 PROCEDURE — U0005 INFEC AGEN DETEC AMPLI PROBE: HCPCS | Performed by: INTERNAL MEDICINE

## 2022-10-11 PROCEDURE — 87449 NOS EACH ORGANISM AG IA: CPT | Performed by: INTERNAL MEDICINE

## 2022-10-11 PROCEDURE — 25010000002 AZITHROMYCIN PER 500 MG: Performed by: INTERNAL MEDICINE

## 2022-10-11 PROCEDURE — 94799 UNLISTED PULMONARY SVC/PX: CPT

## 2022-10-11 PROCEDURE — 80048 BASIC METABOLIC PNL TOTAL CA: CPT | Performed by: INTERNAL MEDICINE

## 2022-10-11 PROCEDURE — 83880 ASSAY OF NATRIURETIC PEPTIDE: CPT | Performed by: INTERNAL MEDICINE

## 2022-10-11 PROCEDURE — 87899 AGENT NOS ASSAY W/OPTIC: CPT | Performed by: INTERNAL MEDICINE

## 2022-10-11 PROCEDURE — 87641 MR-STAPH DNA AMP PROBE: CPT | Performed by: INTERNAL MEDICINE

## 2022-10-11 PROCEDURE — 87804 INFLUENZA ASSAY W/OPTIC: CPT | Performed by: INTERNAL MEDICINE

## 2022-10-11 PROCEDURE — 85027 COMPLETE CBC AUTOMATED: CPT | Performed by: INTERNAL MEDICINE

## 2022-10-11 PROCEDURE — 99233 SBSQ HOSP IP/OBS HIGH 50: CPT | Performed by: INTERNAL MEDICINE

## 2022-10-11 PROCEDURE — 25010000002 CEFTRIAXONE PER 250 MG: Performed by: INTERNAL MEDICINE

## 2022-10-11 PROCEDURE — U0004 COV-19 TEST NON-CDC HGH THRU: HCPCS | Performed by: INTERNAL MEDICINE

## 2022-10-11 PROCEDURE — 87040 BLOOD CULTURE FOR BACTERIA: CPT | Performed by: INTERNAL MEDICINE

## 2022-10-11 RX ORDER — CEFTRIAXONE SODIUM 1 G/50ML
1 INJECTION, SOLUTION INTRAVENOUS EVERY 24 HOURS
Status: DISCONTINUED | OUTPATIENT
Start: 2022-10-11 | End: 2022-10-12

## 2022-10-11 RX ORDER — QUETIAPINE FUMARATE 25 MG/1
50 TABLET, FILM COATED ORAL NIGHTLY
Status: DISCONTINUED | OUTPATIENT
Start: 2022-10-11 | End: 2022-10-12

## 2022-10-11 RX ORDER — ACETAMINOPHEN 325 MG/1
650 TABLET ORAL EVERY 4 HOURS PRN
Status: DISCONTINUED | OUTPATIENT
Start: 2022-10-11 | End: 2022-10-14 | Stop reason: HOSPADM

## 2022-10-11 RX ORDER — TAMSULOSIN HYDROCHLORIDE 0.4 MG/1
0.4 CAPSULE ORAL DAILY
Status: DISCONTINUED | OUTPATIENT
Start: 2022-10-11 | End: 2022-10-14 | Stop reason: HOSPADM

## 2022-10-11 RX ORDER — SACCHAROMYCES BOULARDII 250 MG
250 CAPSULE ORAL 2 TIMES DAILY
Status: DISCONTINUED | OUTPATIENT
Start: 2022-10-11 | End: 2022-10-14 | Stop reason: HOSPADM

## 2022-10-11 RX ADMIN — FINASTERIDE 5 MG: 5 TABLET, FILM COATED ORAL at 08:25

## 2022-10-11 RX ADMIN — PRIMIDONE 250 MG: 250 TABLET ORAL at 16:52

## 2022-10-11 RX ADMIN — Medication 250 MG: at 20:40

## 2022-10-11 RX ADMIN — QUETIAPINE FUMARATE 50 MG: 25 TABLET ORAL at 20:40

## 2022-10-11 RX ADMIN — Medication 10 ML: at 20:40

## 2022-10-11 RX ADMIN — TAMSULOSIN HYDROCHLORIDE 0.4 MG: 0.4 CAPSULE ORAL at 20:41

## 2022-10-11 RX ADMIN — ASPIRIN 81 MG CHEWABLE TABLET 81 MG: 81 TABLET CHEWABLE at 08:25

## 2022-10-11 RX ADMIN — LEVOTHYROXINE SODIUM 50 MCG: 50 TABLET ORAL at 05:46

## 2022-10-11 RX ADMIN — CEFTRIAXONE SODIUM 1 G: 1 INJECTION, SOLUTION INTRAVENOUS at 14:10

## 2022-10-11 RX ADMIN — METOPROLOL TARTRATE 12.5 MG: 25 TABLET, FILM COATED ORAL at 20:40

## 2022-10-11 RX ADMIN — PRIMIDONE 250 MG: 250 TABLET ORAL at 08:25

## 2022-10-11 RX ADMIN — AZITHROMYCIN 500 MG: 500 INJECTION, POWDER, LYOPHILIZED, FOR SOLUTION INTRAVENOUS at 15:04

## 2022-10-11 RX ADMIN — ACETAMINOPHEN 650 MG: 325 TABLET ORAL at 14:26

## 2022-10-11 RX ADMIN — PRIMIDONE 250 MG: 250 TABLET ORAL at 20:40

## 2022-10-11 RX ADMIN — Medication 10 ML: at 08:25

## 2022-10-11 NOTE — PLAN OF CARE
Goal Outcome Evaluation:           Progress: no change  Outcome Evaluation: patient alert with confusion throughout shift. VSS. patient c/o headache. N.O for tylenol. patient currently sleeping. call light within reach. will continue monitoring.

## 2022-10-11 NOTE — PLAN OF CARE
Goal Outcome Evaluation:  Plan of Care Reviewed With: patient        Progress: no change  Outcome Evaluation: pt was alert at beginning of chift per previous nurse, pt is confused this afternoon, no complaints noted or voiced, mtz patent to bsd, VSS, will continue with plan of care
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
## 2022-10-12 LAB
ANION GAP SERPL CALCULATED.3IONS-SCNC: 9.5 MMOL/L (ref 5–15)
BASOPHILS # BLD AUTO: 0.03 10*3/MM3 (ref 0–0.2)
BASOPHILS NFR BLD AUTO: 0.3 % (ref 0–1.5)
BUN SERPL-MCNC: 14 MG/DL (ref 8–23)
BUN/CREAT SERPL: 20.6 (ref 7–25)
CALCIUM SPEC-SCNC: 8.8 MG/DL (ref 8.6–10.5)
CHLORIDE SERPL-SCNC: 102 MMOL/L (ref 98–107)
CO2 SERPL-SCNC: 26.5 MMOL/L (ref 22–29)
CREAT SERPL-MCNC: 0.68 MG/DL (ref 0.76–1.27)
DEPRECATED RDW RBC AUTO: 51.8 FL (ref 37–54)
EGFRCR SERPLBLD CKD-EPI 2021: 94 ML/MIN/1.73
EOSINOPHIL # BLD AUTO: 0.26 10*3/MM3 (ref 0–0.4)
EOSINOPHIL NFR BLD AUTO: 2.6 % (ref 0.3–6.2)
ERYTHROCYTE [DISTWIDTH] IN BLOOD BY AUTOMATED COUNT: 15.3 % (ref 12.3–15.4)
GLUCOSE SERPL-MCNC: 99 MG/DL (ref 65–99)
HCT VFR BLD AUTO: 34.3 % (ref 37.5–51)
HGB BLD-MCNC: 11.7 G/DL (ref 13–17.7)
IMM GRANULOCYTES # BLD AUTO: 0.02 10*3/MM3 (ref 0–0.05)
IMM GRANULOCYTES NFR BLD AUTO: 0.2 % (ref 0–0.5)
LYMPHOCYTES # BLD AUTO: 2.39 10*3/MM3 (ref 0.7–3.1)
LYMPHOCYTES NFR BLD AUTO: 24.3 % (ref 19.6–45.3)
MCH RBC QN AUTO: 31.5 PG (ref 26.6–33)
MCHC RBC AUTO-ENTMCNC: 34.1 G/DL (ref 31.5–35.7)
MCV RBC AUTO: 92.2 FL (ref 79–97)
MONOCYTES # BLD AUTO: 1.37 10*3/MM3 (ref 0.1–0.9)
MONOCYTES NFR BLD AUTO: 13.9 % (ref 5–12)
NEUTROPHILS NFR BLD AUTO: 5.77 10*3/MM3 (ref 1.7–7)
NEUTROPHILS NFR BLD AUTO: 58.7 % (ref 42.7–76)
NRBC BLD AUTO-RTO: 0 /100 WBC (ref 0–0.2)
PLAT MORPH BLD: NORMAL
PLATELET # BLD AUTO: 270 10*3/MM3 (ref 140–450)
PMV BLD AUTO: 11.3 FL (ref 6–12)
POTASSIUM SERPL-SCNC: 3.6 MMOL/L (ref 3.5–5.2)
RBC # BLD AUTO: 3.72 10*6/MM3 (ref 4.14–5.8)
RBC MORPH BLD: NORMAL
SODIUM SERPL-SCNC: 138 MMOL/L (ref 136–145)
WBC MORPH BLD: NORMAL
WBC NRBC COR # BLD: 9.84 10*3/MM3 (ref 3.4–10.8)

## 2022-10-12 PROCEDURE — 97165 OT EVAL LOW COMPLEX 30 MIN: CPT

## 2022-10-12 PROCEDURE — 97116 GAIT TRAINING THERAPY: CPT

## 2022-10-12 PROCEDURE — 97530 THERAPEUTIC ACTIVITIES: CPT

## 2022-10-12 PROCEDURE — 80048 BASIC METABOLIC PNL TOTAL CA: CPT | Performed by: INTERNAL MEDICINE

## 2022-10-12 PROCEDURE — 85007 BL SMEAR W/DIFF WBC COUNT: CPT | Performed by: INTERNAL MEDICINE

## 2022-10-12 PROCEDURE — 85025 COMPLETE CBC W/AUTO DIFF WBC: CPT | Performed by: INTERNAL MEDICINE

## 2022-10-12 PROCEDURE — 25010000002 CEFTRIAXONE PER 250 MG: Performed by: INTERNAL MEDICINE

## 2022-10-12 PROCEDURE — 92526 ORAL FUNCTION THERAPY: CPT

## 2022-10-12 PROCEDURE — 94799 UNLISTED PULMONARY SVC/PX: CPT

## 2022-10-12 PROCEDURE — 99233 SBSQ HOSP IP/OBS HIGH 50: CPT | Performed by: INTERNAL MEDICINE

## 2022-10-12 RX ORDER — AZITHROMYCIN 250 MG/1
500 TABLET, FILM COATED ORAL EVERY 24 HOURS
Status: COMPLETED | OUTPATIENT
Start: 2022-10-12 | End: 2022-10-13

## 2022-10-12 RX ORDER — QUETIAPINE FUMARATE 25 MG/1
50 TABLET, FILM COATED ORAL NIGHTLY
Status: DISCONTINUED | OUTPATIENT
Start: 2022-10-12 | End: 2022-10-14 | Stop reason: HOSPADM

## 2022-10-12 RX ORDER — AMOXICILLIN AND CLAVULANATE POTASSIUM 875; 125 MG/1; MG/1
1 TABLET, FILM COATED ORAL EVERY 12 HOURS SCHEDULED
Status: DISCONTINUED | OUTPATIENT
Start: 2022-10-13 | End: 2022-10-14 | Stop reason: HOSPADM

## 2022-10-12 RX ADMIN — ASPIRIN 81 MG CHEWABLE TABLET 81 MG: 81 TABLET CHEWABLE at 09:57

## 2022-10-12 RX ADMIN — Medication 250 MG: at 21:34

## 2022-10-12 RX ADMIN — PRIMIDONE 250 MG: 250 TABLET ORAL at 09:57

## 2022-10-12 RX ADMIN — QUETIAPINE FUMARATE 50 MG: 25 TABLET ORAL at 21:33

## 2022-10-12 RX ADMIN — TAMSULOSIN HYDROCHLORIDE 0.4 MG: 0.4 CAPSULE ORAL at 09:57

## 2022-10-12 RX ADMIN — PRIMIDONE 250 MG: 250 TABLET ORAL at 21:34

## 2022-10-12 RX ADMIN — PRIMIDONE 250 MG: 250 TABLET ORAL at 16:38

## 2022-10-12 RX ADMIN — Medication 10 ML: at 21:33

## 2022-10-12 RX ADMIN — FINASTERIDE 5 MG: 5 TABLET, FILM COATED ORAL at 09:57

## 2022-10-12 RX ADMIN — Medication 250 MG: at 09:57

## 2022-10-12 RX ADMIN — CEFTRIAXONE SODIUM 1 G: 1 INJECTION, SOLUTION INTRAVENOUS at 11:04

## 2022-10-12 RX ADMIN — METOPROLOL TARTRATE 12.5 MG: 25 TABLET, FILM COATED ORAL at 09:57

## 2022-10-12 RX ADMIN — AZITHROMYCIN MONOHYDRATE 500 MG: 250 TABLET ORAL at 14:47

## 2022-10-12 RX ADMIN — METOPROLOL TARTRATE 12.5 MG: 25 TABLET, FILM COATED ORAL at 21:33

## 2022-10-12 RX ADMIN — Medication 10 ML: at 09:57

## 2022-10-12 RX ADMIN — LEVOTHYROXINE SODIUM 50 MCG: 50 TABLET ORAL at 05:54
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
## 2022-10-12 NOTE — THERAPY TREATMENT NOTE
Acute Care - Speech Language Pathology   Swallow Treatment Note JOCELYN Butterfield     Patient Name: Wood Desai  : 1940  MRN: [id number]  Today's Date: 10/12/2022               Admit Date: 10/9/2022    Visit Dx:     ICD-10-CM ICD-9-CM   1. Weakness  R53.1 780.79   2. Oropharyngeal dysphagia  R13.12 787.22   3. Difficulty walking  R26.2 719.7   4. Decreased activities of daily living (ADL)  Z78.9 V49.89     Patient Active Problem List   Diagnosis   • Acute cystitis   • BPH (benign prostatic hyperplasia)   • Gout   • High blood pressure   • Coronary artery disease involving native heart without angina pectoris   • Pneumonia of left lung due to infectious organism   • Elevated troponin   • Benign prostatic hyperplasia with urinary frequency   • Malignant neoplasm of urinary bladder (HCC)   • SAH (subarachnoid hemorrhage) (HCC)   • Fatigue   • Dehydration   • Weakness     Past Medical History:   Diagnosis Date   • Acute cystitis 2018   • BPH (benign prostatic hyperplasia)    • Brain bleed (HCC)    • Broken ankle    • Broken neck (HCC)    • Coronary artery disease involving native heart without angina pectoris 2022   • Gout    • High blood pressure    • Myocardial infarction (HCC)      Past Surgical History:   Procedure Laterality Date   • COLON SURGERY     • CRANIOTOMY     • CYSTOSCOPY     • EXTERNAL EAR SURGERY     • HERNIA REPAIR     • NECK SURGERY     • NOSE SURGERY     • SHOULDER SURGERY     • SKIN CANCER EXCISION     • SPLENECTOMY     SPEECH PATHOLOGY DYSPHAGIA TREATMENT    Subjective/Behavioral Observations: Patient awake, sitting upright in bed.  Required encouragement for p.o. intake.  Patient valuated by speech pathology on 10/10/2022 and placed on a mechanical soft solid and thin liquid diet.        Day/time of Treatment: 10/12/2022        Current Diet: Mechanical soft, thin liquids        Results of treatment: Assist for self-feeding.  Items cut small.  Some extended mastication time  however no significant oral residue observed with swallow completions.  Tolerating thin liquid trials by control cup drink without any overt signs or symptoms of aspiration.        Progress toward goals: Progressing        Barriers to Achieving goals: Medical status        Plan of care:/changes in plan: Daily per plan.  At bedside, patient appears to tolerate current diet recommendations mechanical soft solids and thin liquids.  Patient however does require some encouragement for p.o. intake.                  SLP Recommendation and Plan                                                                         Plan of Care Reviewed With: patient          EDUCATION  The patient has been educated in the following areas:   Dysphagia (Swallowing Impairment).              Time Calculation:    Time Calculation- SLP     Row Name 10/12/22 1324             Time Calculation- SLP    SLP Stop Time 1300  -SN      SLP Received On 10/12/22  -SN         Untimed Charges    33195-LG Treatment Swallow Minutes 45  -SN         Total Minutes    Untimed Charges Total Minutes 45  -SN       Total Minutes 45  -SN            User Key  (r) = Recorded By, (t) = Taken By, (c) = Cosigned By    Initials Name Provider Type    SN Hermelinda Reyes SLP Speech and Language Pathologist                Therapy Charges for Today     Code Description Service Date Service Provider Modifiers Qty    10087836014 HC ST TREATMENT SWALLOW 3 10/12/2022 Hermelinda Reyes SLP GN 1               JOSE J Sandoval  10/12/2022

## 2022-10-12 NOTE — PLAN OF CARE
Problem: Adult Inpatient Plan of Care  Goal: Plan of Care Review  Outcome: Ongoing, Progressing  Flowsheets (Taken 10/12/2022 1724)  Progress: improving  Plan of Care Reviewed With: patient  Outcome Evaluation: Pt disoriented to place and situation this morning, mentation fluctuated throughout shift. Family has been present at bedside throughout most of afternoon. Pt x2 transfer to bedside commode or chair. Voiding trial delayed until tomorrow. Bed/chair alert activated and audible. Antibiotics switched to oral route on shift. Continuing to monitor and provide patient care.   Goal Outcome Evaluation:  Plan of Care Reviewed With: patient        Progress: improving  Outcome Evaluation: Pt disoriented to place and situation this morning, mentation fluctuated throughout shift. Family has been present at bedside throughout most of afternoon. Pt x2 transfer to bedside commode or chair. Voiding trial delayed until tomorrow. Bed/chair alert activated and audible. Antibiotics switched to oral route on shift. Continuing to monitor and provide patient care.

## 2022-10-12 NOTE — PLAN OF CARE
Goal Outcome Evaluation:  Plan of Care Reviewed With: patient        Progress: no change  Outcome Evaluation: Patient presents with limitations that impede his/her ability to perform ADLS. The skills of a therapist are necessary to maximize independence with ADLs.  Recommend subacute rehab following hospital discharge.

## 2022-10-12 NOTE — THERAPY EVALUATION
Patient Name: Wood Desai  : 1940    MRN: [id number]                              Today's Date: 10/12/2022       Admit Date: 10/9/2022    Visit Dx:     ICD-10-CM ICD-9-CM   1. Weakness  R53.1 780.79   2. Oropharyngeal dysphagia  R13.12 787.22   3. Difficulty walking  R26.2 719.7   4. Decreased activities of daily living (ADL)  Z78.9 V49.89     Patient Active Problem List   Diagnosis   • Acute cystitis   • BPH (benign prostatic hyperplasia)   • Gout   • High blood pressure   • Coronary artery disease involving native heart without angina pectoris   • Pneumonia of left lung due to infectious organism   • Elevated troponin   • Benign prostatic hyperplasia with urinary frequency   • Malignant neoplasm of urinary bladder (HCC)   • SAH (subarachnoid hemorrhage) (HCC)   • Fatigue   • Dehydration   • Weakness     Past Medical History:   Diagnosis Date   • Acute cystitis 2018   • BPH (benign prostatic hyperplasia)    • Brain bleed (HCC)    • Broken ankle    • Broken neck (HCC)    • Coronary artery disease involving native heart without angina pectoris 2022   • Gout    • High blood pressure    • Myocardial infarction (HCC)      Past Surgical History:   Procedure Laterality Date   • COLON SURGERY     • CRANIOTOMY     • CYSTOSCOPY     • EXTERNAL EAR SURGERY     • HERNIA REPAIR     • NECK SURGERY     • NOSE SURGERY     • SHOULDER SURGERY     • SKIN CANCER EXCISION     • SPLENECTOMY        General Information     Row Name 10/12/22 1112          OT Time and Intention    Document Type evaluation  -AC     Mode of Treatment individual therapy;occupational therapy  -AC     Row Name 10/12/22 111          General Information    Patient Profile Reviewed yes  -AC     Prior Level of Function --  patient reports wife assist with adls, used a walker for functional mobility, has shower chair and BSC.  -AC     Existing Precautions/Restrictions fall  -AC     Barriers to Rehab none identified  -AC     Row Name  10/12/22 1112          Occupational Profile    Reason for Services/Referral (Occupational Profile) Pt. is a 82year old male admitted for the above diagnosis. Pt. referred to OT services to assess independence with ADLs and adl transfers/fx'l mobility. No previous OT services for current condition.  -     Row Name 10/12/22 1112          Living Environment    People in Home spouse  -     Row Name 10/12/22 1112          Cognition    Orientation Status (Cognition) oriented to;person;situation  -     Row Name 10/12/22 1112          Safety Issues, Functional Mobility    Safety Issues Affecting Function (Mobility) --  none identified  -     Impairments Affecting Function (Mobility) balance;endurance/activity tolerance;range of motion (ROM);coordination  -           User Key  (r) = Recorded By, (t) = Taken By, (c) = Cosigned By    Initials Name Provider Type     Dasha Ambriz, OT Occupational Therapist                 Mobility/ADL's     Row Name 10/12/22 1117          Bed Mobility    Comment, (Bed Mobility) patient in recliner upon OT arrival in the room.  -     Row Name 10/12/22 1117          Transfers    Transfers sit-stand transfer;stand-sit transfer  -     Row Name 10/12/22 1117          Sit-Stand Transfer    Sit-Stand [place] (Transfers) minimum assist (75% patient effort);verbal cues;1 person assist  -     Assistive Device (Sit-Stand Transfers) walker, front-wheeled  -     Row Name 10/12/22 1117          Stand-Sit Transfer    Stand-Sit [place] (Transfers) minimum assist (75% patient effort);verbal cues;1 person assist  -     Assistive Device (Stand-Sit Transfers) walker, front-wheeled  -     Row Name 10/12/22 1117          Activities of Daily Living    BADL Assessment/Intervention --  patient is min A for upper body bathing/dressing, min A for grooming, min/SBA for self-feeding, max A for lower body dressing, mod A for lower body bathing, mod A for toileting.  -           User Key   (r) = Recorded By, (t) = Taken By, (c) = Cosigned By    Initials Name Provider Type     Dasha Ambriz OT Occupational Therapist               Obj/Interventions     Row Name 10/12/22 1119          Sensory Assessment (Somatosensory)    Sensory Assessment (Somatosensory) sensation intact  -     Row Name 10/12/22 1119          Vision Assessment/Intervention    Visual Impairment/Limitations WFL  -     Row Name 10/12/22 1119          Range of Motion Comprehensive    Comment, General Range of Motion RUE shoulder impaired previously, WFL distally/ LUE WNL  -     Row Name 10/12/22 1119          Strength Comprehensive (MMT)    Comment, General Manual Muscle Testing (MMT) Assessment RUE  3/5, LUE 5/5  -     Row Name 10/12/22 1119          Balance    Balance Assessment standing static balance  -     Static Standing Balance verbal cues;1-person assist;minimal assist  -     Position/Device Used, Standing Balance supported;walker, rolling  -AC     Balance Interventions standing;sit to stand;supported;static;minimal challenge;occupation based/functional task  -           User Key  (r) = Recorded By, (t) = Taken By, (c) = Cosigned By    Initials Name Provider Type    AC Dasha Ambriz OT Occupational Therapist               Goals/Plan     Row Name 10/12/22 1122          Transfer Goal 1 (OT)    Activity/Assistive Device (Transfer Goal 1, OT) transfers, all  -AC     [place] Level/Cues Needed (Transfer Goal 1, OT) modified independence  -AC     Time Frame (Transfer Goal 1, OT) long term goal (LTG);10 days  -     Row Name 10/12/22 1122          Bathing Goal 1 (OT)    Activity/Device (Bathing Goal 1, OT) bathing skills, all  -AC     [place] Level/Cues Needed (Bathing Goal 1, OT) modified independence  -AC     Time Frame (Bathing Goal 1, OT) long term goal (LTG);10 days  -     Row Name 10/12/22 1122          Dressing Goal 1 (OT)    Activity/Device (Dressing Goal 1, OT) dressing skills, all  -AC      [place]/Cues Needed (Dressing Goal 1, OT) modified independence  -AC     Time Frame (Dressing Goal 1, OT) long term goal (LTG);10 days  -AC     Row Name 10/12/22 1122          Toileting Goal 1 (OT)    Activity/Device (Toileting Goal 1, OT) toileting skills, all  -AC     [place] Level/Cues Needed (Toileting Goal 1, OT) modified independence  -AC     Time Frame (Toileting Goal 1, OT) long term goal (LTG);10 days  -AC     Row Name 10/12/22 1122          Grooming Goal 1 (OT)    Activity/Device (Grooming Goal 1, OT) grooming skills, all  -AC     [place] (Grooming Goal 1, OT) modified independence  -AC     Time Frame (Grooming Goal 1, OT) long term goal (LTG);10 days  -AC     Row Name 10/12/22 1122          Problem Specific Goal 1 (OT)    Problem Specific Goal 1 (OT) patient will demonstrate good activity tolerance for adls.  -AC     Time Frame (Problem Specific Goal 1, OT) long term goal (LTG);10 days  -AC     Row Name 10/12/22 1122          Therapy Assessment/Plan (OT)    Planned Therapy Interventions (OT) activity tolerance training;BADL retraining;functional balance retraining;occupation/activity based interventions;ROM/therapeutic exercise;transfer/mobility retraining;patient/caregiver education/training  -           User Key  (r) = Recorded By, (t) = Taken By, (c) = Cosigned By    Initials Name Provider Type    Dasha Paiz OT Occupational Therapist               Clinical Impression     Row Name 10/12/22 1121          Pain Assessment    Additional Documentation Pain Scale: FACES Pre/Post-Treatment (Group)  -AC     Row Name 10/12/22 1121          Pain Scale: FACES Pre/Post-Treatment    Pain: FACES Scale, Pretreatment 0-->no hurt  -AC     Posttreatment Pain Rating 0-->no hurt  -AC     Row Name 10/12/22 1121          Plan of Care Review    Plan of Care Reviewed With patient  -AC     Progress no change  -AC     Outcome Evaluation Patient presents with limitations that impede his/her ability to  perform ADLS. The skills of a therapist are necessary to maximize independence with ADLs.  Recommend subacute rehab following hospital discharge.  -     Row Name 10/12/22 1121          Therapy Assessment/Plan (OT)    Patient/Family Therapy Goal Statement (OT) Patient would like to maximize independence with adls.  -AC     Rehab Potential (OT) good, to achieve stated therapy goals  -     Criteria for Skilled Therapeutic Interventions Met (OT) yes;meets criteria;skilled treatment is necessary  -     Therapy Frequency (OT) 5 times/wk  -     Row Name 10/12/22 1121          Therapy Plan Review/Discharge Plan (OT)    Equipment Needs Upon Discharge (OT) walker, rolling;commode chair  -AC     Anticipated Discharge Disposition (OT) sub acute care setting  -     Row Name 10/12/22 1121          Positioning and Restraints    Pre-Treatment Position sitting in chair/recliner  -AC     Post Treatment Position chair  -AC     In Chair call light within reach;encouraged to call for assist;exit alarm on;sitting  -AC           User Key  (r) = Recorded By, (t) = Taken By, (c) = Cosigned By    Initials Name Provider Type    AC Dasha Ambriz, OT Occupational Therapist               Outcome Measures     Row Name 10/12/22 1123          How much help from another is currently needed...    Putting on and taking off regular lower body clothing? 2  -AC     Bathing (including washing, rinsing, and drying) 3  -AC     Toileting (which includes using toilet bed pan or urinal) 3  -AC     Putting on and taking off regular upper body clothing 3  -AC     Taking care of personal grooming (such as brushing teeth) 3  -AC     Eating meals 3  -AC     AM-PAC 6 Clicks Score (OT) 17  -AC     Row Name 10/12/22 1123          Functional Assessment    Outcome Measure Options AM-PAC 6 Clicks Daily Activity (OT);Optimal Instrument  -     Row Name 10/12/22 1123          Optimal Instrument    Optimal Instrument Optimal - 3  -AC     Bending/Stooping 3  -AC      Standing 2  -AC     Reaching 2  -AC     From the list, choose the 3 activities you would most like to be able to do without any difficulty Standing;Reaching;Bending/stooping  -AC     Total Score Optimal - 3 7  -AC           User Key  (r) = Recorded By, (t) = Taken By, (c) = Cosigned By    Initials Name Provider Type    Dasha Paiz OT Occupational Therapist                Occupational Therapy Education     Title: PT OT SLP Therapies (Done)     Topic: Occupational Therapy (Done)     Point: ADL training (Done)     Description:   Instruct learner(s) on proper safety adaptation and remediation techniques during self care or transfers.   Instruct in proper use of assistive devices.              Learning Progress Summary           Patient Acceptance, E, VU by  at 10/12/2022 1124    Acceptance, E,TB,D, VU,DU,NR by  at 10/11/2022 0815                   Point: Home exercise program (Done)     Description:   Instruct learner(s) on appropriate technique for monitoring, assisting and/or progressing therapeutic exercises/activities.              Learning Progress Summary           Patient Acceptance, E, VU by  at 10/12/2022 1124    Acceptance, E,TB,D, VU,DU,NR by  at 10/11/2022 0815                   Point: Precautions (Done)     Description:   Instruct learner(s) on prescribed precautions during self-care and functional transfers.              Learning Progress Summary           Patient Acceptance, E, VU by  at 10/12/2022 1124    Acceptance, E,TB,D, VU,DU,NR by  at 10/11/2022 0815                   Point: Body mechanics (Done)     Description:   Instruct learner(s) on proper positioning and spine alignment during self-care, functional mobility activities and/or exercises.              Learning Progress Summary           Patient Acceptance, E, VU by  at 10/12/2022 1124    Acceptance, E,TB,D, VU,DU,NR by  at 10/11/2022 0815                               User Key     Initials Effective Dates Name Provider  Type Discipline     09/07/21 -  Ami Montana, RN Registered Nurse Nurse     06/16/21 -  Dasha Ambriz OT Occupational Therapist OT              OT Recommendation and Plan  Planned Therapy Interventions (OT): activity tolerance training, BADL retraining, functional balance retraining, occupation/activity based interventions, ROM/therapeutic exercise, transfer/mobility retraining, patient/caregiver education/training  Therapy Frequency (OT): 5 times/wk  Plan of Care Review  Plan of Care Reviewed With: patient  Progress: no change  Outcome Evaluation: Patient presents with limitations that impede his/her ability to perform ADLS. The skills of a therapist are necessary to maximize independence with ADLs.  Recommend subacute rehab following hospital discharge.     Time Calculation:    Time Calculation- OT     Row Name 10/12/22 1126             Time Calculation- OT    OT Received On 10/12/22  -      OT Goal Re-Cert Due Date 10/21/22  -         Untimed Charges    OT Eval/Re-eval Minutes 32  -AC         Total Minutes    Untimed Charges Total Minutes 32  -AC       Total Minutes 32  -AC            User Key  (r) = Recorded By, (t) = Taken By, (c) = Cosigned By    Initials Name Provider Type    AC Dasha Ambriz OT Occupational Therapist              Therapy Charges for Today     Code Description Service Date Service Provider Modifiers Qty    [id number] HC OT EVAL LOW COMPLEXITY 3 10/12/2022 Dasha Ambriz OT GO 1               Dasha Ambriz OT  10/12/2022

## 2022-10-12 NOTE — THERAPY TREATMENT NOTE
Acute Care - Physical Therapy Progress Note   [place]     Patient Name: Wood Desai  : 1940  MRN: [id number]  Today's Date: 10/12/2022      Visit Dx:     ICD-10-CM ICD-9-CM   1. Weakness  R53.1 780.79   2. Oropharyngeal dysphagia  R13.12 787.22   3. Difficulty walking  R26.2 719.7   4. Decreased activities of daily living (ADL)  Z78.9 V49.89     Patient Active Problem List   Diagnosis   • Acute cystitis   • BPH (benign prostatic hyperplasia)   • Gout   • High blood pressure   • Coronary artery disease involving native heart without angina pectoris   • Pneumonia of left lung due to infectious organism   • Elevated troponin   • Benign prostatic hyperplasia with urinary frequency   • Malignant neoplasm of urinary bladder (HCC)   • SAH (subarachnoid hemorrhage) (HCC)   • Fatigue   • Dehydration   • Weakness     Past Medical History:   Diagnosis Date   • Acute cystitis 2018   • BPH (benign prostatic hyperplasia)    • Brain bleed (HCC)    • Broken ankle    • Broken neck (HCC)    • Coronary artery disease involving native heart without angina pectoris 2022   • Gout    • High blood pressure    • Myocardial infarction (HCC)      Past Surgical History:   Procedure Laterality Date   • COLON SURGERY     • CRANIOTOMY     • CYSTOSCOPY     • EXTERNAL EAR SURGERY     • HERNIA REPAIR     • NECK SURGERY     • NOSE SURGERY     • SHOULDER SURGERY     • SKIN CANCER EXCISION     • SPLENECTOMY       PT Assessment (last 12 hours)     PT Evaluation and Treatment     Row Name 10/12/22 1300          Physical Therapy Time and Intention    Subjective Information no complaints  -CS     Document Type therapy note (daily note)  -CS     Mode of Treatment individual therapy;physical therapy  -CS     Patient Effort adequate  -CS     Symptoms Noted During/After Treatment fatigue  -CS     Row Name 10/12/22 1300          Bed Mobility    Supine-Sit [place] (Bed Mobility) verbal cues;moderate assist (50% patient effort);1  person assist  -CS     Bed Mobility, Safety Issues decreased use of arms for pushing/pulling;decreased use of legs for bridging/pushing  -CS     Assistive Device (Bed Mobility) bed rails;draw sheet;head of bed elevated  -CS     Row Name 10/12/22 1300          Sit-Stand Transfer    Sit-Stand [place] (Transfers) verbal cues;minimum assist (75% patient effort);1 person assist  -CS     Assistive Device (Sit-Stand Transfers) walker, front-wheeled  -CS     Row Name 10/12/22 1300          Stand-Sit Transfer    Stand-Sit [place] (Transfers) verbal cues;minimum assist (75% patient effort);1 person assist  -CS     Assistive Device (Stand-Sit Transfers) walker, front-wheeled  -CS     Row Name 10/12/22 1300          Gait/Stairs (Locomotion)    [place] Level (Gait) minimum assist (75% patient effort);1 person assist;verbal cues  -CS     Assistive Device (Gait) walker, front-wheeled  -CS     Distance in Feet (Gait) 25  -CS     Pattern (Gait) 4-point;step-to  -CS     Deviations/Abnormal Patterns (Gait) base of support, narrow;festinating/shuffling;gait speed decreased;stride length decreased  -CS     Bilateral Gait Deviations forward flexed posture  -CS     Row Name 10/12/22 1300          Positioning and Restraints    Pre-Treatment Position in bed  with bed alarm  -CS     Post Treatment Position chair  -CS     In Chair sitting;call light within reach;encouraged to call for assist;exit alarm on;notified nsg  -CS     Row Name 10/12/22 1300          Progress Summary (PT)    Progress Toward Functional Goals (PT) progress toward functional goals is fair  -CS           User Key  (r) = Recorded By, (t) = Taken By, (c) = Cosigned By    Initials Name Provider Type    CS Tristan Bustillo PTA Physical Therapist Assistant                Physical Therapy Education     Title: PT OT SLP Therapies (Done)     Topic: Physical Therapy (Done)     Point: Mobility training (Done)     Learning Progress Summary           Patient  Acceptance, E, VU by  at 10/12/2022 1124    Acceptance, E,TB,D, VU,DU,NR by TB at 10/11/2022 0815    Acceptance, E,TB, VU by AV at 10/10/2022 1527                   Point: Home exercise program (Done)     Learning Progress Summary           Patient Acceptance, E, VU by AC at 10/12/2022 1124    Acceptance, E,TB,D, VU,DU,NR by TB at 10/11/2022 0815                   Point: Body mechanics (Done)     Learning Progress Summary           Patient Acceptance, E, VU by  at 10/12/2022 1124    Acceptance, E,TB,D, VU,DU,NR by TB at 10/11/2022 0815    Acceptance, E,TB, VU by AV at 10/10/2022 1527                   Point: Precautions (Done)     Learning Progress Summary           Patient Acceptance, E, VU by  at 10/12/2022 1124    Acceptance, E,TB,D, VU,DU,NR by  at 10/11/2022 0815    Acceptance, E,TB, VU by AV at 10/10/2022 1527                               User Key     Initials Effective Dates Name Provider Type Discipline     09/07/21 -  Ami Montana, RN Registered Nurse Nurse     06/16/21 -  Dasha Ambriz OT Occupational Therapist OT     06/11/21 -  Ihsan Harris, DEBBIE Physical Therapist PT              PT Recommendation and Plan     Progress Summary (PT)  Progress Toward Functional Goals (PT): progress toward functional goals is fair   Outcome Measures     Row Name 10/12/22 1300 10/10/22 1500          How much help from another person do you currently need...    Turning from your back to your side while in flat bed without using bedrails? 3  -CS 3  -AV     Moving from lying on back to sitting on the side of a flat bed without bedrails? 2  -CS 2  -AV     Moving to and from a bed to a chair (including a wheelchair)? 3  -CS 3  -AV     Standing up from a chair using your arms (e.g., wheelchair, bedside chair)? 3  -CS 3  -AV     Climbing 3-5 steps with a railing? 2  -CS 2  -AV     To walk in hospital room? 3  -CS 3  -AV     AM-PAC 6 Clicks Score (PT) 16  -CS 16  -AV        Functional Assessment    Outcome Measure  Options AM-PAC 6 Clicks Basic Mobility (PT)  -CS AM-PAC 6 Clicks Basic Mobility (PT)  -AV           User Key  (r) = Recorded By, (t) = Taken By, (c) = Cosigned By    Initials Name Provider Type    AV Ihsan Harris, PT Physical Therapist    CS Tristan Bustillo PTA Physical Therapist Assistant                 Time Calculation:    PT Charges     Row Name 10/12/22 1350             Time Calculation    Start Time 0910  -CS      PT Received On 10/12/22  -CS         Timed Charges    41324 - Gait Training Minutes  9  -CS      09368 - PT Therapeutic Activity Minutes 14  -CS         Total Minutes    Timed Charges Total Minutes 23  -CS       Total Minutes 23  -CS            User Key  (r) = Recorded By, (t) = Taken By, (c) = Cosigned By    Initials Name Provider Type    Tristan Rogers PTA Physical Therapist Assistant              Therapy Charges for Today     Code Description Service Date Service Provider Modifiers Qty    03360275195 HC GAIT TRAINING EA 15 MIN 10/12/2022 Tristan Bustillo PTA GP 1    37833878651 HC PT THERAPEUTIC ACT EA 15 MIN 10/12/2022 Tristan Bustillo PTA GP 1          PT G-Codes  Outcome Measure Options: AM-PAC 6 Clicks Basic Mobility (PT)  AM-PAC 6 Clicks Score (PT): 16  AM-PAC 6 Clicks Score (OT): 17    Tristan Bustillo PTA  10/12/2022

## 2022-10-13 VITALS
RESPIRATION RATE: 18 BRPM | BODY MASS INDEX: 22.1 KG/M2 | HEART RATE: 64 BPM | DIASTOLIC BLOOD PRESSURE: 68 MMHG | HEIGHT: 72 IN | TEMPERATURE: 97.7 F | OXYGEN SATURATION: 95 % | SYSTOLIC BLOOD PRESSURE: 142 MMHG | WEIGHT: 163.14 LBS

## 2022-10-13 PROCEDURE — 99239 HOSP IP/OBS DSCHRG MGMT >30: CPT | Performed by: INTERNAL MEDICINE

## 2022-10-13 RX ORDER — LACTULOSE 10 G/15ML
20 SOLUTION ORAL AS NEEDED
Start: 2022-10-13

## 2022-10-13 RX ORDER — FINASTERIDE 5 MG/1
5 TABLET, FILM COATED ORAL DAILY
Qty: 90 TABLET | Refills: 4
Start: 2022-10-13

## 2022-10-13 RX ORDER — QUETIAPINE FUMARATE 50 MG/1
50 TABLET, FILM COATED ORAL NIGHTLY
Start: 2022-10-13

## 2022-10-13 RX ORDER — ACETAMINOPHEN 325 MG/1
650 TABLET ORAL EVERY 4 HOURS PRN
Start: 2022-10-13

## 2022-10-13 RX ORDER — AMOXICILLIN 250 MG
1 CAPSULE ORAL 2 TIMES DAILY PRN
Start: 2022-10-13

## 2022-10-13 RX ORDER — AMOXICILLIN 250 MG
1 CAPSULE ORAL 2 TIMES DAILY
Status: DISCONTINUED | OUTPATIENT
Start: 2022-10-13 | End: 2022-10-14 | Stop reason: HOSPADM

## 2022-10-13 RX ORDER — TAMSULOSIN HYDROCHLORIDE 0.4 MG/1
0.4 CAPSULE ORAL DAILY
Qty: 30 CAPSULE
Start: 2022-10-14

## 2022-10-13 RX ORDER — LEVOTHYROXINE SODIUM 0.05 MG/1
50 TABLET ORAL DAILY
Start: 2022-10-13

## 2022-10-13 RX ORDER — AMOXICILLIN AND CLAVULANATE POTASSIUM 875; 125 MG/1; MG/1
1 TABLET, FILM COATED ORAL EVERY 12 HOURS SCHEDULED
Qty: 7 TABLET | Refills: 0
Start: 2022-10-13 | End: 2022-10-17

## 2022-10-13 RX ORDER — ASPIRIN 81 MG/1
81 TABLET, CHEWABLE ORAL DAILY
Qty: 1 TABLET | Refills: 0
Start: 2022-10-13

## 2022-10-13 RX ORDER — SACCHAROMYCES BOULARDII 250 MG
250 CAPSULE ORAL 2 TIMES DAILY
Qty: 10 CAPSULE | Refills: 0
Start: 2022-10-13 | End: 2022-10-18

## 2022-10-13 RX ORDER — CHOLECALCIFEROL (VITAMIN D3) 125 MCG
5 CAPSULE ORAL NIGHTLY PRN
Start: 2022-10-13

## 2022-10-13 RX ADMIN — TAMSULOSIN HYDROCHLORIDE 0.4 MG: 0.4 CAPSULE ORAL at 09:06

## 2022-10-13 RX ADMIN — SENNOSIDES AND DOCUSATE SODIUM 1 TABLET: 8.6; 5 TABLET ORAL at 09:05

## 2022-10-13 RX ADMIN — Medication 250 MG: at 20:26

## 2022-10-13 RX ADMIN — FINASTERIDE 5 MG: 5 TABLET, FILM COATED ORAL at 09:05

## 2022-10-13 RX ADMIN — AMOXICILLIN AND CLAVULANATE POTASSIUM 1 TABLET: 875; 125 TABLET, FILM COATED ORAL at 20:27

## 2022-10-13 RX ADMIN — Medication 10 ML: at 09:05

## 2022-10-13 RX ADMIN — METOPROLOL TARTRATE 12.5 MG: 25 TABLET, FILM COATED ORAL at 09:05

## 2022-10-13 RX ADMIN — Medication 10 ML: at 20:27

## 2022-10-13 RX ADMIN — AMOXICILLIN AND CLAVULANATE POTASSIUM 1 TABLET: 875; 125 TABLET, FILM COATED ORAL at 09:05

## 2022-10-13 RX ADMIN — QUETIAPINE FUMARATE 50 MG: 25 TABLET ORAL at 20:27

## 2022-10-13 RX ADMIN — Medication 250 MG: at 09:06

## 2022-10-13 RX ADMIN — PRIMIDONE 250 MG: 250 TABLET ORAL at 09:06

## 2022-10-13 RX ADMIN — LEVOTHYROXINE SODIUM 50 MCG: 50 TABLET ORAL at 06:12

## 2022-10-13 RX ADMIN — ASPIRIN 81 MG CHEWABLE TABLET 81 MG: 81 TABLET CHEWABLE at 09:05

## 2022-10-13 RX ADMIN — PRIMIDONE 250 MG: 250 TABLET ORAL at 20:27

## 2022-10-13 RX ADMIN — METOPROLOL TARTRATE 12.5 MG: 25 TABLET, FILM COATED ORAL at 20:26

## 2022-10-13 RX ADMIN — PRIMIDONE 250 MG: 250 TABLET ORAL at 17:48

## 2022-10-13 RX ADMIN — AZITHROMYCIN MONOHYDRATE 500 MG: 250 TABLET ORAL at 13:13

## 2022-10-13 RX ADMIN — SENNOSIDES AND DOCUSATE SODIUM 1 TABLET: 8.6; 5 TABLET ORAL at 20:27
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
## 2022-10-13 NOTE — DISCHARGE INSTR - APPOINTMENTS
Follow up with Dr. Fallon on 10/17/2022, 09:30am  [phone]  [address] [place], [place] [id number]

## 2022-10-13 NOTE — DISCHARGE INSTR - DIET
Soft texture, thin liquids, remain upright (90 degrees) during meals and for 30m after. Use chin-tuck method if indicated

## 2022-10-13 NOTE — CONSULTS
Pt has a rehab bed at [facility]. Bed available today.    [facility]  [address] [place], [place] [id number] [phone]

## 2022-10-13 NOTE — CONSULTS
Purpose of the visit was to evaluate for: support for patient/family and hospice referral/discussion. Spoke with MD, RN, patient and family and discussed care options and [facility].      Assessment: Pt sitting in bedside chair and visiting with spouse awaiting discharge to rehab today. Spouse wanted to speak with palliative separate from pt due to his intermittent confusion. Spouse discussed pt's medical history, current status, and goals of care. Pt's current goal to get stronger with therapy. However, spouse understands pt will likely not make significant improvements with therapy as evidenced by multiple hospitalizations over the past two years. Spouse also discussed signs and symptoms that are consistent with end-of-life (decrease in appetite, less communication and conversation, increase in sleep, and withdrawal for normal activities). Pt has been through a significant trauma and surgeries within a short amount of time, which has drastically changed his lifestyle likely causing emotional pain.  Spouse wants to know what other options are available if he continues to decline or is too tired to keep fighting.  Explained [facility] and [facility]. Spouse is open to a referral to [facility] and more education on [facility].     Recommendations/Plan: [facility] referral.    Tasks Completed: Emotional Support.    Other Comments: Spouse is primary caretaker and could also benefit from extra supports when pt returns home.     ELISSA Vu

## 2022-10-13 NOTE — CONSULTS
"Nutrition Services    Patient Name: Wood Desai  YOB: 1940  MRN: [id number]  Admission date: 10/9/2022      CLINICAL NUTRITION ASSESSMENT      Reason for Assessment  Follow-up protocol, MST score 2+     H&P:    Past Medical History:   Diagnosis Date   • Acute cystitis 04/17/2018   • BPH (benign prostatic hyperplasia)    • Brain bleed (HCC)    • Broken ankle    • Broken neck (HCC)    • Coronary artery disease involving native heart without angina pectoris 01/03/2022   • Gout    • High blood pressure    • Myocardial infarction (HCC)         Current Problems:   Active Hospital Problems    Diagnosis    • **Fatigue    • Dehydration    • Weakness         Nutrition/Diet History         Narrative     Patient admitted with generalized weakness and dehydration.  MD notes indicate poor PO intake prior to admission.  Patient now on soft whole foods diet.     Nutrition consult for MST score 2+.  Patient was unsure of weight loss on admission.  Chart review reveals stable body weight x 8 months.  PO intake has been variable, 0-50%. Patient would benefit from the addition of oral nutrition supplements to help meet estimated nutrient needs. Patient currently confused per nursing notes. Not appropriate for nutrition interview at this time. RD will order oral nutrition supplement and CTM per protocol.     Anthropometrics        Current Height, Weight Height: 182.9 cm (72\")  Weight: 74 kg (163 lb 2.3 oz)   Current BMI Body mass index is 22.13 kg/m².       Weight Hx  Wt Readings from Last 30 Encounters:   10/12/22 1128 74 kg (163 lb 2.3 oz)   10/09/22 1541 70.6 kg (155 lb 10.3 oz)   09/16/22 0957 75.2 kg (165 lb 12.6 oz)   09/15/22 0300 75.2 kg (165 lb 12.6 oz)   09/14/22 1950 77.4 kg (170 lb 10.2 oz)   02/16/22 1114 68 kg (149 lb 14.6 oz)   02/16/22 0500 68.1 kg (150 lb 2.1 oz)   02/15/22 2045 73.3 kg (161 lb 9.6 oz)   04/26/21 0000 81.6 kg (180 lb)   12/28/20 0000 83 kg (183 lb)   04/17/19 0000 85.3 kg (188 lb) "   04/15/19 0000 85.3 kg (188 lb)   10/12/18 0000 77.1 kg (170 lb)   07/11/18 0000 76.2 kg (168 lb)   05/25/18 0000 76.2 kg (168 lb)   05/02/18 0000 77.1 kg (170 lb)   04/13/18 0000 77.1 kg (170 lb)   04/05/18 0000 76.4 kg (168 lb 8 oz)   01/19/18 0000 80.5 kg (177 lb 8 oz)            Wt Change Observation Stable x 8 months      Estimated/Assessed Needs       Energy Requirements    EST Needs (kcal/day) 2118 kcal        Protein Requirements    EST Daily Needs (g/day) 71-85 g pro        Fluid Requirements     Estimated Needs (mL/day) 2118 ml fluid      Labs/Medications         Pertinent Labs Reviewed.   Results from last 7 days   Lab Units 10/12/22  0600 10/11/22  0401 10/09/22  2037 10/09/22  1836   SODIUM mmol/L 138 140  --  138   SODIUM, ARTERIAL mmol/L  --   --  137.5  --    POTASSIUM mmol/L 3.6 4.1  --  4.6   CHLORIDE mmol/L 102 102  --  101   CO2 mmol/L 26.5 24.9  --  26.0   BUN mg/dL 14 16  --  19   CREATININE mg/dL 0.68* 0.68*  --  0.74*   CALCIUM mg/dL 8.8 9.1  --  9.8   BILIRUBIN mg/dL  --   --   --  0.3   ALK PHOS U/L  --   --   --  200*   ALT (SGPT) U/L  --   --   --  28   AST (SGOT) U/L  --   --   --  21   GLUCOSE mg/dL 99 99  --  96   GLUCOSE, ARTERIAL mmol/L  --   --  114*  --      Results from last 7 days   Lab Units 10/12/22  0600 10/10/22  0810 10/09/22  1836 10/09/22  1558   MAGNESIUM mg/dL  --   --  2.2 2.1   PHOSPHORUS mg/dL  --   --  3.0  --    HEMOGLOBIN g/dL 11.7*   < >  --  14.6   HEMATOCRIT % 34.3*   < >  --  43.9    < > = values in this interval not displayed.     COVID19   Date Value Ref Range Status   10/11/2022 Not Detected Not Detected - Ref. Range Final     Lab Results   Component Value Date    HGBA1C 6.50 (H) 09/15/2022         Pertinent Medications Reviewed.     Current Nutrition Orders & Evaluation of Intake       Oral Nutrition     Current PO Diet Diet Soft Texture; Whole Foods   Supplement No active supplement orders       Malnutrition Severity Assessment                Nutrition  Diagnosis         Nutrition Dx Problem 1 Inadequate energy Intake related to decreased ability to consume sufficient energy as evidenced by documented poor PO intake.     Nutrition Intervention         Boost Plus BID (+720 kcal, 28 g protein daily)     Medical Nutrition Therapy/Nutrition Education          Learner     Readiness Patient  Education not appropriate at this time     Method     Response N/A  N/A     Monitor/Evaluation        Monitor PO intake, Supplement intake, Pertinent labs, Weight       Nutrition Discharge Plan         To be determined       Electronically signed by:  Marie Nguyen RD  10/13/22 08:26 EDT

## 2022-10-13 NOTE — PLAN OF CARE
Problem: Adult Inpatient Plan of Care  Goal: Plan of Care Review  Outcome: Ongoing, Progressing  Flowsheets (Taken 10/13/2022 1315)  Progress: improving  Outcome Evaluation: Pt continues to be confused to situation throughout shift but has been calm and cooperative. Pt up to chair for breakfast, has been resting quietly throughout most of morning and afternoon. VSS, catheter to remain in place per conversation with MD unless contraindicated by discharge facility. Palliative consulted and to speak with family later today. Pt to discharge later this afternoon to [facility] Continuing to monitor and provide patient care.   Goal Outcome Evaluation:  Plan of Care Reviewed With: patient        Progress: improving  Outcome Evaluation: Pt continues to be confused to situation throughout shift but has been calm and cooperative. Pt up to chair for breakfast, has been resting quietly throughout most of morning and afternoon. VSS, catheter to remain in place per conversation with MD unless contraindicated by discharge facility. Palliative consulted and to speak with family later today. Pt to discharge later this afternoon to [facility] Continuing to monitor and provide patient care.

## 2022-10-13 NOTE — PLAN OF CARE
Goal Outcome Evaluation:  Plan of Care Reviewed With: patient        Progress: no change  Outcome Evaluation: Patient with no acute overnight events. Other than med administration and vitals, patient was able to sleep without difficulty throughout the night. Patient remains confused to place and situation but is cooperative. VSS and WNL overnight. Urine output low at 200ml total output for the night. Bladder scan performed approx 0600 showing 0ml bladder volume.

## 2022-10-16 LAB
BACTERIA SPEC AEROBE CULT: NORMAL
BACTERIA SPEC AEROBE CULT: NORMAL

## 2023-03-10 ENCOUNTER — TELEPHONE (OUTPATIENT)
Dept: UROLOGY | Facility: CLINIC | Age: 83
End: 2023-03-10

## 2023-03-10 NOTE — TELEPHONE ENCOUNTER
Provider: DR JUSTICE    Reason for Call: PT IS IN NURSING HOME AND NO LONGER REQUIRES OUR SERVICES.